# Patient Record
Sex: FEMALE | Race: WHITE | Employment: UNEMPLOYED | URBAN - METROPOLITAN AREA
[De-identification: names, ages, dates, MRNs, and addresses within clinical notes are randomized per-mention and may not be internally consistent; named-entity substitution may affect disease eponyms.]

---

## 2017-04-14 PROCEDURE — 88305 TISSUE EXAM BY PATHOLOGIST: CPT | Performed by: RADIOLOGY

## 2017-05-08 PROCEDURE — 88175 CYTOPATH C/V AUTO FLUID REDO: CPT | Performed by: OBSTETRICS & GYNECOLOGY

## 2017-11-17 PROBLEM — Q23.1 BICUSPID AORTIC VALVE: Status: ACTIVE | Noted: 2017-11-17

## 2017-11-24 ENCOUNTER — HOSPITAL ENCOUNTER (OUTPATIENT)
Dept: CT IMAGING | Facility: HOSPITAL | Age: 57
Discharge: HOME | End: 2017-11-24
Attending: INTERNAL MEDICINE
Payer: COMMERCIAL

## 2017-11-24 DIAGNOSIS — Q23.1 BICUSPID AORTIC VALVE: ICD-10-CM

## 2017-11-24 DIAGNOSIS — E78.00 PURE HYPERCHOLESTEROLEMIA: ICD-10-CM

## 2017-11-24 PROCEDURE — 71275 CT ANGIOGRAPHY CHEST: CPT | Performed by: INTERNAL MEDICINE

## 2018-01-19 ENCOUNTER — HOSPITAL ENCOUNTER (OUTPATIENT)
Dept: LAB | Facility: HOSPITAL | Age: 58
Discharge: HOME | End: 2018-01-19
Attending: THORACIC SURGERY (CARDIOTHORACIC VASCULAR SURGERY)
Payer: COMMERCIAL

## 2018-01-19 ENCOUNTER — HOSPITAL ENCOUNTER (OUTPATIENT)
Dept: INTERVENTIONAL RADIOLOGY/VASCULAR | Facility: HOSPITAL | Age: 58
Discharge: HOME | End: 2018-01-19
Attending: THORACIC SURGERY (CARDIOTHORACIC VASCULAR SURGERY)
Payer: COMMERCIAL

## 2018-01-19 ENCOUNTER — HOSPITAL ENCOUNTER (OUTPATIENT)
Dept: GENERAL RADIOLOGY | Facility: HOSPITAL | Age: 58
Discharge: HOME | End: 2018-01-19
Attending: THORACIC SURGERY (CARDIOTHORACIC VASCULAR SURGERY)
Payer: COMMERCIAL

## 2018-01-19 ENCOUNTER — HOSPITAL ENCOUNTER (OUTPATIENT)
Dept: ULTRASOUND IMAGING | Facility: HOSPITAL | Age: 58
Discharge: HOME | End: 2018-01-19
Attending: THORACIC SURGERY (CARDIOTHORACIC VASCULAR SURGERY)
Payer: COMMERCIAL

## 2018-01-19 DIAGNOSIS — I35.0 AORTIC STENOSIS: ICD-10-CM

## 2018-01-19 DIAGNOSIS — E78.5 HYPERLIPIDEMIA: ICD-10-CM

## 2018-01-19 DIAGNOSIS — F32.A DEPRESSION: ICD-10-CM

## 2018-01-19 DIAGNOSIS — I35.0 AS (AORTIC STENOSIS): ICD-10-CM

## 2018-01-19 DIAGNOSIS — Z01.818 PREOP TESTING: ICD-10-CM

## 2018-01-19 LAB
ALBUMIN SERPL-MCNC: 3.9 G/DL (ref 3.5–4.8)
ALP LIVER SERPL-CCNC: 78 U/L (ref 46–118)
ALT SERPL-CCNC: 34 U/L (ref 14–54)
ANTIBODY SCREEN: NEGATIVE
APTT PPP: 33 SECONDS (ref 25–34)
AST SERPL-CCNC: 20 U/L (ref 15–41)
ATRIAL RATE: 75 BPM
BASOPHILS # BLD AUTO: 0.08 X10(3) UL (ref 0–0.1)
BASOPHILS NFR BLD AUTO: 0.8 %
BILIRUB SERPL-MCNC: 0.4 MG/DL (ref 0.1–2)
BILIRUB UR QL STRIP.AUTO: NEGATIVE
BUN BLD-MCNC: 12 MG/DL (ref 8–20)
CALCIUM BLD-MCNC: 8.9 MG/DL (ref 8.3–10.3)
CHLORIDE: 108 MMOL/L (ref 101–111)
CLARITY UR REFRACT.AUTO: CLEAR
CO2: 26 MMOL/L (ref 22–32)
CREAT BLD-MCNC: 0.68 MG/DL (ref 0.55–1.02)
EOSINOPHIL # BLD AUTO: 0.39 X10(3) UL (ref 0–0.3)
EOSINOPHIL NFR BLD AUTO: 3.7 %
ERYTHROCYTE [DISTWIDTH] IN BLOOD BY AUTOMATED COUNT: 13.4 % (ref 11.5–16)
EST. AVERAGE GLUCOSE BLD GHB EST-MCNC: 126 MG/DL (ref 68–126)
GLUCOSE BLD-MCNC: 94 MG/DL (ref 70–99)
GLUCOSE UR STRIP.AUTO-MCNC: NEGATIVE MG/DL
HBA1C MFR BLD HPLC: 6 % (ref ?–5.7)
HCT VFR BLD AUTO: 42.2 % (ref 34–50)
HGB BLD-MCNC: 13.9 G/DL (ref 12–16)
HYALINE CASTS #/AREA URNS AUTO: PRESENT /LPF
IMMATURE GRANULOCYTE COUNT: 0.02 X10(3) UL (ref 0–1)
IMMATURE GRANULOCYTE RATIO %: 0.2 %
INR BLD: 0.94 (ref 0.89–1.11)
KETONES UR STRIP.AUTO-MCNC: NEGATIVE MG/DL
LYMPHOCYTES # BLD AUTO: 2.97 X10(3) UL (ref 0.9–4)
LYMPHOCYTES NFR BLD AUTO: 28.3 %
M PROTEIN MFR SERPL ELPH: 7.6 G/DL (ref 6.1–8.3)
MCH RBC QN AUTO: 30 PG (ref 27–33.2)
MCHC RBC AUTO-ENTMCNC: 32.9 G/DL (ref 31–37)
MCV RBC AUTO: 90.9 FL (ref 81–100)
MONOCYTES # BLD AUTO: 0.79 X10(3) UL (ref 0.1–0.6)
MONOCYTES NFR BLD AUTO: 7.5 %
NEUTROPHIL ABS PRELIM: 6.23 X10 (3) UL (ref 1.3–6.7)
NEUTROPHILS # BLD AUTO: 6.23 X10(3) UL (ref 1.3–6.7)
NEUTROPHILS NFR BLD AUTO: 59.5 %
NITRITE UR QL STRIP.AUTO: NEGATIVE
P AXIS: 51 DEGREES
P-R INTERVAL: 138 MS
PH UR STRIP.AUTO: 5 [PH] (ref 4.5–8)
PLATELET # BLD AUTO: 346 10(3)UL (ref 150–450)
POTASSIUM SERPL-SCNC: 4 MMOL/L (ref 3.6–5.1)
PROT UR STRIP.AUTO-MCNC: 100 MG/DL
PSA SERPL DL<=0.01 NG/ML-MCNC: 12.6 SECONDS (ref 12–14.3)
Q-T INTERVAL: 428 MS
QRS DURATION: 94 MS
QTC CALCULATION (BEZET): 477 MS
R AXIS: 21 DEGREES
RBC # BLD AUTO: 4.64 X10(6)UL (ref 3.8–5.1)
RBC UR QL AUTO: NEGATIVE
RED CELL DISTRIBUTION WIDTH-SD: 44.8 FL (ref 35.1–46.3)
RH BLOOD TYPE: POSITIVE
SODIUM SERPL-SCNC: 140 MMOL/L (ref 136–144)
SP GR UR STRIP.AUTO: 1.03 (ref 1–1.03)
T AXIS: 118 DEGREES
UROBILINOGEN UR STRIP.AUTO-MCNC: <2 MG/DL
VENTRICULAR RATE: 75 BPM
WBC # BLD AUTO: 10.5 X10(3) UL (ref 4–13)

## 2018-01-19 PROCEDURE — 87077 CULTURE AEROBIC IDENTIFY: CPT | Performed by: THORACIC SURGERY (CARDIOTHORACIC VASCULAR SURGERY)

## 2018-01-19 PROCEDURE — 81001 URINALYSIS AUTO W/SCOPE: CPT | Performed by: THORACIC SURGERY (CARDIOTHORACIC VASCULAR SURGERY)

## 2018-01-19 PROCEDURE — 86901 BLOOD TYPING SEROLOGIC RH(D): CPT | Performed by: THORACIC SURGERY (CARDIOTHORACIC VASCULAR SURGERY)

## 2018-01-19 PROCEDURE — 85610 PROTHROMBIN TIME: CPT | Performed by: THORACIC SURGERY (CARDIOTHORACIC VASCULAR SURGERY)

## 2018-01-19 PROCEDURE — 80053 COMPREHEN METABOLIC PANEL: CPT | Performed by: THORACIC SURGERY (CARDIOTHORACIC VASCULAR SURGERY)

## 2018-01-19 PROCEDURE — 87086 URINE CULTURE/COLONY COUNT: CPT | Performed by: THORACIC SURGERY (CARDIOTHORACIC VASCULAR SURGERY)

## 2018-01-19 PROCEDURE — 85025 COMPLETE CBC W/AUTO DIFF WBC: CPT | Performed by: THORACIC SURGERY (CARDIOTHORACIC VASCULAR SURGERY)

## 2018-01-19 PROCEDURE — 36415 COLL VENOUS BLD VENIPUNCTURE: CPT | Performed by: THORACIC SURGERY (CARDIOTHORACIC VASCULAR SURGERY)

## 2018-01-19 PROCEDURE — 83036 HEMOGLOBIN GLYCOSYLATED A1C: CPT | Performed by: THORACIC SURGERY (CARDIOTHORACIC VASCULAR SURGERY)

## 2018-01-19 PROCEDURE — 86900 BLOOD TYPING SEROLOGIC ABO: CPT | Performed by: THORACIC SURGERY (CARDIOTHORACIC VASCULAR SURGERY)

## 2018-01-19 PROCEDURE — 93005 ELECTROCARDIOGRAM TRACING: CPT

## 2018-01-19 PROCEDURE — 85730 THROMBOPLASTIN TIME PARTIAL: CPT | Performed by: THORACIC SURGERY (CARDIOTHORACIC VASCULAR SURGERY)

## 2018-01-19 PROCEDURE — 93880 EXTRACRANIAL BILAT STUDY: CPT | Performed by: THORACIC SURGERY (CARDIOTHORACIC VASCULAR SURGERY)

## 2018-01-19 PROCEDURE — 93010 ELECTROCARDIOGRAM REPORT: CPT | Performed by: INTERNAL MEDICINE

## 2018-01-19 PROCEDURE — 71045 X-RAY EXAM CHEST 1 VIEW: CPT | Performed by: THORACIC SURGERY (CARDIOTHORACIC VASCULAR SURGERY)

## 2018-01-19 PROCEDURE — 86850 RBC ANTIBODY SCREEN: CPT | Performed by: THORACIC SURGERY (CARDIOTHORACIC VASCULAR SURGERY)

## 2018-01-19 PROCEDURE — 87186 SC STD MICRODIL/AGAR DIL: CPT | Performed by: THORACIC SURGERY (CARDIOTHORACIC VASCULAR SURGERY)

## 2018-01-19 RX ORDER — CALCIUM CARBONATE 500 MG/1
TABLET, CHEWABLE ORAL
COMMUNITY
End: 2018-02-15

## 2018-01-19 NOTE — CM/SW NOTE
01/19/18 1400   CM/SW Referral Data   Referral Source (PAT)   Reason for Referral Discharge planning   Informant Patient;Spouse   Patient Info   Patient's Mental Status Alert;Oriented   Patient's [address]   Patient lives with Spouse   Vandana

## 2018-01-21 ENCOUNTER — ANESTHESIA EVENT (OUTPATIENT)
Dept: CARDIAC SURGERY | Facility: HOSPITAL | Age: 58
DRG: 219 | End: 2018-01-21
Payer: COMMERCIAL

## 2018-01-26 ENCOUNTER — HOSPITAL ENCOUNTER (INPATIENT)
Facility: HOSPITAL | Age: 58
LOS: 13 days | Discharge: HOME | DRG: 219 | End: 2018-02-08
Attending: THORACIC SURGERY (CARDIOTHORACIC VASCULAR SURGERY) | Admitting: THORACIC SURGERY (CARDIOTHORACIC VASCULAR SURGERY)
Payer: COMMERCIAL

## 2018-01-26 ENCOUNTER — APPOINTMENT (OUTPATIENT)
Dept: GENERAL RADIOLOGY | Facility: HOSPITAL | Age: 58
DRG: 219 | End: 2018-01-26
Attending: THORACIC SURGERY (CARDIOTHORACIC VASCULAR SURGERY)
Payer: COMMERCIAL

## 2018-01-26 ENCOUNTER — ANESTHESIA (OUTPATIENT)
Dept: CARDIAC SURGERY | Facility: HOSPITAL | Age: 58
DRG: 219 | End: 2018-01-26
Payer: COMMERCIAL

## 2018-01-26 ENCOUNTER — SURGERY (OUTPATIENT)
Age: 58
End: 2018-01-26

## 2018-01-26 DIAGNOSIS — E78.00 PURE HYPERCHOLESTEROLEMIA: ICD-10-CM

## 2018-01-26 DIAGNOSIS — Q23.1 BICUSPID AORTIC VALVE: ICD-10-CM

## 2018-01-26 LAB
APTT PPP: 34.8 SECONDS (ref 25–34)
ARTERIAL BLD GAS O2 SATURATION: 94 % (ref 92–100)
ARTERIAL BLD GAS O2 SATURATION: 94 % (ref 92–100)
ARTERIAL BLD GAS O2 SATURATION: 95 % (ref 92–100)
ARTERIAL BLD GAS O2 SATURATION: 95 % (ref 92–100)
ARTERIAL BLOOD GAS BASE EXCESS: -1.6
ARTERIAL BLOOD GAS BASE EXCESS: -1.9
ARTERIAL BLOOD GAS BASE EXCESS: -2.1
ARTERIAL BLOOD GAS BASE EXCESS: -3
ARTERIAL BLOOD GAS HCO3: 21.7 MEQ/L (ref 22–26)
ARTERIAL BLOOD GAS HCO3: 23 MEQ/L (ref 22–26)
ARTERIAL BLOOD GAS HCO3: 23.6 MEQ/L (ref 22–26)
ARTERIAL BLOOD GAS HCO3: 24 MEQ/L (ref 22–26)
ARTERIAL BLOOD GAS PCO2: 39 MM HG (ref 35–45)
ARTERIAL BLOOD GAS PCO2: 40 MM HG (ref 35–45)
ARTERIAL BLOOD GAS PCO2: 42 MM HG (ref 35–45)
ARTERIAL BLOOD GAS PCO2: 47 MM HG (ref 35–45)
ARTERIAL BLOOD GAS PH: 7.33 (ref 7.35–7.45)
ARTERIAL BLOOD GAS PH: 7.37 (ref 7.35–7.45)
ARTERIAL BLOOD GAS PH: 7.37 (ref 7.35–7.45)
ARTERIAL BLOOD GAS PH: 7.38 (ref 7.35–7.45)
ARTERIAL BLOOD GAS PO2: 80 MM HG (ref 80–105)
ARTERIAL BLOOD GAS PO2: 81 MM HG (ref 80–105)
ARTERIAL BLOOD GAS PO2: 85 MM HG (ref 80–105)
ARTERIAL BLOOD GAS PO2: 94 MM HG (ref 80–105)
BUN BLD-MCNC: 15 MG/DL (ref 8–20)
CALCIUM BLD-MCNC: 8.1 MG/DL (ref 8.3–10.3)
CALCULATED O2 SATURATION: 95 % (ref 92–100)
CALCULATED O2 SATURATION: 96 % (ref 92–100)
CALCULATED O2 SATURATION: 96 % (ref 92–100)
CALCULATED O2 SATURATION: 97 % (ref 92–100)
CARBOXYHEMOGLOBIN: 0.8 % SAT (ref 0–3)
CARBOXYHEMOGLOBIN: 0.9 % SAT (ref 0–3)
CARBOXYHEMOGLOBIN: 0.9 % SAT (ref 0–3)
CARBOXYHEMOGLOBIN: 1.1 % SAT (ref 0–3)
CHLORIDE: 110 MMOL/L (ref 101–111)
CO2: 25 MMOL/L (ref 22–32)
CREAT BLD-MCNC: 0.96 MG/DL (ref 0.55–1.02)
ERYTHROCYTE [DISTWIDTH] IN BLOOD BY AUTOMATED COUNT: 13.2 % (ref 11.5–16)
FIBRINOGEN: 278 MG/DL (ref 200–446)
FIO2: 50 %
FIO2: 60 %
FIO2: 70 %
FIO2: 70 %
GLUCOSE BLD-MCNC: 115 MG/DL (ref 65–99)
GLUCOSE BLD-MCNC: 129 MG/DL (ref 65–99)
GLUCOSE BLD-MCNC: 131 MG/DL (ref 65–99)
GLUCOSE BLD-MCNC: 135 MG/DL (ref 65–99)
GLUCOSE BLD-MCNC: 142 MG/DL (ref 65–99)
GLUCOSE BLD-MCNC: 143 MG/DL (ref 70–99)
GLUCOSE BLD-MCNC: 151 MG/DL (ref 65–99)
GLUCOSE BLD-MCNC: 153 MG/DL (ref 65–99)
GLUCOSE BLD-MCNC: 159 MG/DL (ref 65–99)
GLUCOSE BLD-MCNC: 165 MG/DL (ref 65–99)
GLUCOSE BLD-MCNC: 168 MG/DL (ref 65–99)
GLUCOSE BLD-MCNC: 169 MG/DL (ref 65–99)
GLUCOSE BLD-MCNC: 177 MG/DL (ref 65–99)
HAV IGM SER QL: 2.5 MG/DL (ref 1.7–3)
HCT VFR BLD AUTO: 31.1 % (ref 34–50)
HGB BLD-MCNC: 10.5 G/DL (ref 12–16)
INR BLD: 1.61 (ref 0.89–1.11)
ISTAT ACTIVATED CLOTTING TIME: 120 SECONDS (ref 74–137)
ISTAT BLOOD GAS BASE EXCESS: 0 MMOL/L
ISTAT BLOOD GAS HCO3: 25.3 MEQ/L (ref 22–26)
ISTAT BLOOD GAS O2 SATURATION: 92 % (ref 92–100)
ISTAT BLOOD GAS PCO2: 45 MMHG (ref 35–45)
ISTAT BLOOD GAS PH: 7.36 (ref 7.35–7.45)
ISTAT BLOOD GAS PO2: 66 MMHG (ref 80–105)
ISTAT BLOOD GAS TCO2: 27 MMOL/L (ref 22–32)
ISTAT HEMATOCRIT: 29 % (ref 34–50)
ISTAT IONIZED CALCIUM: 1.2 MMOL/L (ref 1.12–1.32)
ISTAT POTASSIUM: 4.9 MMOL/L (ref 3.6–5.1)
ISTAT SODIUM: 141 MMOL/L (ref 136–144)
MCH RBC QN AUTO: 30.8 PG (ref 27–33.2)
MCHC RBC AUTO-ENTMCNC: 33.8 G/DL (ref 31–37)
MCV RBC AUTO: 91.2 FL (ref 81–100)
METHEMOGLOBIN: 0.9 % SAT (ref 0.4–1.5)
METHEMOGLOBIN: 1.1 % SAT (ref 0.4–1.5)
METHEMOGLOBIN: 1.2 % SAT (ref 0.4–1.5)
METHEMOGLOBIN: 1.4 % SAT (ref 0.4–1.5)
NITRIC OXIDE: 20 PPM
P/F RATIO: 121.8 MMHG
P/F RATIO: 134.3 MMHG
P/F RATIO: 134.8 MMHG
P/F RATIO: 153.2 MMHG
PATIENT TEMPERATURE: 98.6 F
PATIENT TEMPERATURE: 99.9 F
PEEP: 10 CM H2O
PLATELET # BLD AUTO: 144 10(3)UL (ref 150–450)
PLATELET # BLD AUTO: 144 10(3)UL (ref 150–450)
POTASSIUM SERPL-SCNC: 4.8 MMOL/L (ref 3.6–5.1)
PSA SERPL DL<=0.01 NG/ML-MCNC: 19.3 SECONDS (ref 12–14.3)
RBC # BLD AUTO: 3.41 X10(6)UL (ref 3.8–5.1)
RED CELL DISTRIBUTION WIDTH-SD: 44.2 FL (ref 35.1–46.3)
SODIUM SERPL-SCNC: 141 MMOL/L (ref 136–144)
TIDAL VOLUME: 500 ML
TOTAL HEMOGLOBIN: 10 G/DL (ref 11.7–16)
TOTAL HEMOGLOBIN: 10.2 G/DL (ref 11.7–16)
TOTAL HEMOGLOBIN: 10.6 G/DL (ref 11.7–16)
TOTAL HEMOGLOBIN: 10.8 G/DL (ref 11.7–16)
VENT RATE: 14 /MIN
WBC # BLD AUTO: 18.9 X10(3) UL (ref 4–13)

## 2018-01-26 PROCEDURE — P9045 ALBUMIN (HUMAN), 5%, 250 ML: HCPCS | Performed by: THORACIC SURGERY (CARDIOTHORACIC VASCULAR SURGERY)

## 2018-01-26 PROCEDURE — 82803 BLOOD GASES ANY COMBINATION: CPT

## 2018-01-26 PROCEDURE — 87081 CULTURE SCREEN ONLY: CPT | Performed by: THORACIC SURGERY (CARDIOTHORACIC VASCULAR SURGERY)

## 2018-01-26 PROCEDURE — 85014 HEMATOCRIT: CPT

## 2018-01-26 PROCEDURE — 85610 PROTHROMBIN TIME: CPT | Performed by: THORACIC SURGERY (CARDIOTHORACIC VASCULAR SURGERY)

## 2018-01-26 PROCEDURE — 84132 ASSAY OF SERUM POTASSIUM: CPT

## 2018-01-26 PROCEDURE — 82962 GLUCOSE BLOOD TEST: CPT

## 2018-01-26 PROCEDURE — 93010 ELECTROCARDIOGRAM REPORT: CPT | Performed by: INTERNAL MEDICINE

## 2018-01-26 PROCEDURE — 85730 THROMBOPLASTIN TIME PARTIAL: CPT | Performed by: THORACIC SURGERY (CARDIOTHORACIC VASCULAR SURGERY)

## 2018-01-26 PROCEDURE — 82803 BLOOD GASES ANY COMBINATION: CPT | Performed by: ANESTHESIOLOGY

## 2018-01-26 PROCEDURE — 93005 ELECTROCARDIOGRAM TRACING: CPT

## 2018-01-26 PROCEDURE — 82375 ASSAY CARBOXYHB QUANT: CPT | Performed by: THORACIC SURGERY (CARDIOTHORACIC VASCULAR SURGERY)

## 2018-01-26 PROCEDURE — 30233N0 TRANSFUSION OF AUTOLOGOUS RED BLOOD CELLS INTO PERIPHERAL VEIN, PERCUTANEOUS APPROACH: ICD-10-PCS | Performed by: THORACIC SURGERY (CARDIOTHORACIC VASCULAR SURGERY)

## 2018-01-26 PROCEDURE — 85347 COAGULATION TIME ACTIVATED: CPT

## 2018-01-26 PROCEDURE — P9045 ALBUMIN (HUMAN), 5%, 250 ML: HCPCS

## 2018-01-26 PROCEDURE — 82330 ASSAY OF CALCIUM: CPT

## 2018-01-26 PROCEDURE — 85384 FIBRINOGEN ACTIVITY: CPT | Performed by: THORACIC SURGERY (CARDIOTHORACIC VASCULAR SURGERY)

## 2018-01-26 PROCEDURE — 85018 HEMOGLOBIN: CPT | Performed by: THORACIC SURGERY (CARDIOTHORACIC VASCULAR SURGERY)

## 2018-01-26 PROCEDURE — 5A1221Z PERFORMANCE OF CARDIAC OUTPUT, CONTINUOUS: ICD-10-PCS | Performed by: THORACIC SURGERY (CARDIOTHORACIC VASCULAR SURGERY)

## 2018-01-26 PROCEDURE — 88305 TISSUE EXAM BY PATHOLOGIST: CPT | Performed by: THORACIC SURGERY (CARDIOTHORACIC VASCULAR SURGERY)

## 2018-01-26 PROCEDURE — 94002 VENT MGMT INPAT INIT DAY: CPT

## 2018-01-26 PROCEDURE — 82803 BLOOD GASES ANY COMBINATION: CPT | Performed by: THORACIC SURGERY (CARDIOTHORACIC VASCULAR SURGERY)

## 2018-01-26 PROCEDURE — 84295 ASSAY OF SERUM SODIUM: CPT

## 2018-01-26 PROCEDURE — 86920 COMPATIBILITY TEST SPIN: CPT

## 2018-01-26 PROCEDURE — 83050 HGB METHEMOGLOBIN QUAN: CPT | Performed by: THORACIC SURGERY (CARDIOTHORACIC VASCULAR SURGERY)

## 2018-01-26 PROCEDURE — 80048 BASIC METABOLIC PNL TOTAL CA: CPT | Performed by: THORACIC SURGERY (CARDIOTHORACIC VASCULAR SURGERY)

## 2018-01-26 PROCEDURE — 85049 AUTOMATED PLATELET COUNT: CPT | Performed by: THORACIC SURGERY (CARDIOTHORACIC VASCULAR SURGERY)

## 2018-01-26 PROCEDURE — 83050 HGB METHEMOGLOBIN QUAN: CPT | Performed by: ANESTHESIOLOGY

## 2018-01-26 PROCEDURE — S0028 INJECTION, FAMOTIDINE, 20 MG: HCPCS | Performed by: THORACIC SURGERY (CARDIOTHORACIC VASCULAR SURGERY)

## 2018-01-26 PROCEDURE — 88311 DECALCIFY TISSUE: CPT | Performed by: THORACIC SURGERY (CARDIOTHORACIC VASCULAR SURGERY)

## 2018-01-26 PROCEDURE — 71045 X-RAY EXAM CHEST 1 VIEW: CPT | Performed by: THORACIC SURGERY (CARDIOTHORACIC VASCULAR SURGERY)

## 2018-01-26 PROCEDURE — 83735 ASSAY OF MAGNESIUM: CPT | Performed by: THORACIC SURGERY (CARDIOTHORACIC VASCULAR SURGERY)

## 2018-01-26 PROCEDURE — 85018 HEMOGLOBIN: CPT | Performed by: ANESTHESIOLOGY

## 2018-01-26 PROCEDURE — S0028 INJECTION, FAMOTIDINE, 20 MG: HCPCS

## 2018-01-26 PROCEDURE — 82375 ASSAY CARBOXYHB QUANT: CPT | Performed by: ANESTHESIOLOGY

## 2018-01-26 PROCEDURE — 02RF08Z REPLACEMENT OF AORTIC VALVE WITH ZOOPLASTIC TISSUE, OPEN APPROACH: ICD-10-PCS | Performed by: THORACIC SURGERY (CARDIOTHORACIC VASCULAR SURGERY)

## 2018-01-26 PROCEDURE — 85027 COMPLETE CBC AUTOMATED: CPT | Performed by: THORACIC SURGERY (CARDIOTHORACIC VASCULAR SURGERY)

## 2018-01-26 RX ORDER — DEXTROSE MONOHYDRATE 25 G/50ML
50 INJECTION, SOLUTION INTRAVENOUS
Status: DISCONTINUED | OUTPATIENT
Start: 2018-01-26 | End: 2018-02-08

## 2018-01-26 RX ORDER — HYDROCODONE BITARTRATE AND ACETAMINOPHEN 10; 325 MG/1; MG/1
2 TABLET ORAL EVERY 4 HOURS PRN
Status: DISCONTINUED | OUTPATIENT
Start: 2018-01-26 | End: 2018-02-08

## 2018-01-26 RX ORDER — MORPHINE SULFATE 2 MG/ML
4 INJECTION, SOLUTION INTRAMUSCULAR; INTRAVENOUS
Status: DISCONTINUED | OUTPATIENT
Start: 2018-01-26 | End: 2018-02-08

## 2018-01-26 RX ORDER — CHLORHEXIDINE GLUCONATE 0.12 MG/ML
15 RINSE ORAL
Status: DISCONTINUED | OUTPATIENT
Start: 2018-01-26 | End: 2018-01-27

## 2018-01-26 RX ORDER — POTASSIUM CHLORIDE 14.9 MG/ML
20 INJECTION INTRAVENOUS AS NEEDED
Status: DISCONTINUED | OUTPATIENT
Start: 2018-01-26 | End: 2018-02-06 | Stop reason: ALTCHOICE

## 2018-01-26 RX ORDER — DEXMEDETOMIDINE HYDROCHLORIDE 4 UG/ML
INJECTION, SOLUTION INTRAVENOUS CONTINUOUS
Status: DISCONTINUED | OUTPATIENT
Start: 2018-01-26 | End: 2018-01-29

## 2018-01-26 RX ORDER — FAMOTIDINE 20 MG/1
20 TABLET ORAL 2 TIMES DAILY
Status: DISCONTINUED | OUTPATIENT
Start: 2018-01-26 | End: 2018-02-08

## 2018-01-26 RX ORDER — BISACODYL 10 MG
10 SUPPOSITORY, RECTAL RECTAL
Status: DISCONTINUED | OUTPATIENT
Start: 2018-01-26 | End: 2018-02-08

## 2018-01-26 RX ORDER — DEXTROSE AND SODIUM CHLORIDE 5; .45 G/100ML; G/100ML
INJECTION, SOLUTION INTRAVENOUS CONTINUOUS
Status: ACTIVE | OUTPATIENT
Start: 2018-01-26 | End: 2018-01-27

## 2018-01-26 RX ORDER — FAMOTIDINE 10 MG/ML
20 INJECTION, SOLUTION INTRAVENOUS 2 TIMES DAILY
Status: DISCONTINUED | OUTPATIENT
Start: 2018-01-26 | End: 2018-02-06

## 2018-01-26 RX ORDER — POTASSIUM CHLORIDE 29.8 MG/ML
40 INJECTION INTRAVENOUS AS NEEDED
Status: DISCONTINUED | OUTPATIENT
Start: 2018-01-26 | End: 2018-02-06 | Stop reason: ALTCHOICE

## 2018-01-26 RX ORDER — NITROGLYCERIN 20 MG/100ML
INJECTION INTRAVENOUS CONTINUOUS PRN
Status: DISCONTINUED | OUTPATIENT
Start: 2018-01-26 | End: 2018-02-08

## 2018-01-26 RX ORDER — MORPHINE SULFATE 2 MG/ML
8 INJECTION, SOLUTION INTRAMUSCULAR; INTRAVENOUS
Status: DISCONTINUED | OUTPATIENT
Start: 2018-01-26 | End: 2018-02-08

## 2018-01-26 RX ORDER — ASPIRIN 300 MG
300 SUPPOSITORY, RECTAL RECTAL DAILY
Status: DISCONTINUED | OUTPATIENT
Start: 2018-01-27 | End: 2018-02-03

## 2018-01-26 RX ORDER — SODIUM CHLORIDE 9 MG/ML
INJECTION, SOLUTION INTRAVENOUS CONTINUOUS
Status: DISCONTINUED | OUTPATIENT
Start: 2018-01-26 | End: 2018-01-27

## 2018-01-26 RX ORDER — ATORVASTATIN CALCIUM 80 MG/1
80 TABLET, FILM COATED ORAL NIGHTLY
Status: DISCONTINUED | OUTPATIENT
Start: 2018-01-27 | End: 2018-02-08

## 2018-01-26 RX ORDER — HYDROCODONE BITARTRATE AND ACETAMINOPHEN 10; 325 MG/1; MG/1
1 TABLET ORAL EVERY 4 HOURS PRN
Status: DISCONTINUED | OUTPATIENT
Start: 2018-01-26 | End: 2018-02-08

## 2018-01-26 RX ORDER — ONDANSETRON 2 MG/ML
4 INJECTION INTRAMUSCULAR; INTRAVENOUS EVERY 6 HOURS PRN
Status: DISCONTINUED | OUTPATIENT
Start: 2018-01-26 | End: 2018-02-08

## 2018-01-26 RX ORDER — BACITRACIN 50000 [USP'U]/1
INJECTION, POWDER, LYOPHILIZED, FOR SOLUTION INTRAMUSCULAR AS NEEDED
Status: DISCONTINUED | OUTPATIENT
Start: 2018-01-26 | End: 2018-01-26 | Stop reason: HOSPADM

## 2018-01-26 RX ORDER — MIDAZOLAM HYDROCHLORIDE 1 MG/ML
1 INJECTION INTRAMUSCULAR; INTRAVENOUS EVERY 30 MIN PRN
Status: DISCONTINUED | OUTPATIENT
Start: 2018-01-26 | End: 2018-01-28

## 2018-01-26 RX ORDER — TEMAZEPAM 15 MG/1
15 CAPSULE ORAL NIGHTLY PRN
Status: DISCONTINUED | OUTPATIENT
Start: 2018-01-26 | End: 2018-01-30

## 2018-01-26 RX ORDER — MAGNESIUM SULFATE HEPTAHYDRATE 40 MG/ML
2 INJECTION, SOLUTION INTRAVENOUS AS NEEDED
Status: DISCONTINUED | OUTPATIENT
Start: 2018-01-26 | End: 2018-02-06 | Stop reason: ALTCHOICE

## 2018-01-26 RX ORDER — DOCUSATE SODIUM 100 MG/1
100 CAPSULE, LIQUID FILLED ORAL 2 TIMES DAILY
Status: DISCONTINUED | OUTPATIENT
Start: 2018-01-26 | End: 2018-02-08

## 2018-01-26 RX ORDER — DEXTROSE AND SODIUM CHLORIDE 5; .45 G/100ML; G/100ML
INJECTION, SOLUTION INTRAVENOUS CONTINUOUS
Status: ACTIVE | OUTPATIENT
Start: 2018-01-26 | End: 2018-01-26

## 2018-01-26 RX ORDER — POLYETHYLENE GLYCOL 3350 17 G/17G
1 POWDER, FOR SOLUTION ORAL DAILY PRN
Status: DISCONTINUED | OUTPATIENT
Start: 2018-01-26 | End: 2018-02-08

## 2018-01-26 RX ORDER — MAGNESIUM SULFATE 1 G/100ML
1 INJECTION INTRAVENOUS AS NEEDED
Status: DISCONTINUED | OUTPATIENT
Start: 2018-01-26 | End: 2018-02-06 | Stop reason: ALTCHOICE

## 2018-01-26 RX ORDER — ASPIRIN 325 MG
325 TABLET ORAL ONCE
Status: COMPLETED | OUTPATIENT
Start: 2018-01-26 | End: 2018-01-26

## 2018-01-26 RX ORDER — DOBUTAMINE HYDROCHLORIDE 200 MG/100ML
INJECTION INTRAVENOUS CONTINUOUS PRN
Status: DISCONTINUED | OUTPATIENT
Start: 2018-01-26 | End: 2018-02-08

## 2018-01-26 RX ORDER — ALBUMIN, HUMAN INJ 5% 5 %
250 SOLUTION INTRAVENOUS ONCE AS NEEDED
Status: COMPLETED | OUTPATIENT
Start: 2018-01-26 | End: 2018-01-26

## 2018-01-26 RX ORDER — MORPHINE SULFATE 2 MG/ML
2 INJECTION, SOLUTION INTRAMUSCULAR; INTRAVENOUS
Status: DISCONTINUED | OUTPATIENT
Start: 2018-01-26 | End: 2018-02-08

## 2018-01-26 RX ORDER — ASPIRIN 81 MG/1
81 TABLET ORAL DAILY
Status: DISCONTINUED | OUTPATIENT
Start: 2018-01-27 | End: 2018-02-03

## 2018-01-26 RX ORDER — ASPIRIN 300 MG
300 SUPPOSITORY, RECTAL RECTAL ONCE
Status: COMPLETED | OUTPATIENT
Start: 2018-01-26 | End: 2018-01-26

## 2018-01-26 NOTE — PROGRESS NOTES
Received patient from [place] [address] s/p AVR. Patient intubated with NO and sedated on propofol and precedex. Vent changes per Dr. Ary Holman, see ABGs and documentation. Patient on dobutamine to keep CI>2, titrating off as tolerated. Insulin gtt per protocol.  Patient w

## 2018-01-26 NOTE — ANESTHESIA POSTPROCEDURE EVALUATION
[address] Patient Status:  Inpatient   Age/Gender 59year old female MRN [id number]   [facility] 6NE-A Attending Godfrey Carranza MD   Hosp Day # 0 PCP Harry Lizama DO       Anesthesia Post-op Note    Procedure(s):

## 2018-01-26 NOTE — PROCEDURES
[facility]  Progress Note    Duane Giron Patient Status:  Inpatient    10/6/1960 MRN [id number]   [facility] 6NE-A Attending Anil Crabtree MD   Hosp Day # 0 PCP Mary Kay Staley DO     Impression:  1.  Bicuspid AV w propofol (DIPRIVAN) infusion 5-100 mcg/kg/min Intravenous Continuous   HYDROmorphone (DILAUDID) 0.2 mg/ml continuous infusion 0.1-0.2 mg/hr Intravenous Continuous   Insulin Regular Human (NOVOLIN R) 100 Units in sodium chloride 0.9 % 100 mL infusion 1-57 aspirin 300 MG rectal suppository 300 mg 300 mg Rectal Once   Or      aspirin tab 325 mg 325 mg Per NG Tube Once   [START ON 1/27/2018] aspirin EC tab 81 mg 81 mg Oral Daily   Or      [START ON 1/27/2018] aspirin 300 MG rectal suppository 300 mg 300 mg R 01/26/2018    01/26/2018   CO2 25.0 01/26/2018   BUN 15 01/26/2018   CREATSERUM 0.96 01/26/2018    01/26/2018   CA 8.1 01/26/2018   MG 2.5 01/26/2018       Telemetry: No malignant tachyarrhythmias or bradyarrhythmias    Please feel free to con

## 2018-01-26 NOTE — CM/SW NOTE
Order for [facility] AT [facility]. Pt is s/p AVR. SW met with pt while in PAT. SW will eval for dc needs once able to participate in therapy.   Teri Khan, 01/26/18, 2:34 PM

## 2018-01-26 NOTE — CONSULTS
[facility]  Progress Note    Bárbara Contreras Patient Status:  Inpatient    10/6/1960 MRN [id number]   [facility] 6NE-A Attending Dany Mendieta MD   Hosp Day # 0 PCP [place] DO Che       Assessment/Plan:58 yo with AS admitted for Ventricular rate 99 BPM   Atrial rate 99 BPM   P-R Interval 142 ms   QRS Duration 86 ms   Q-T Interval 424 ms   QTC Calculation (Bezet) 544 ms   P Axis 60 degrees   R Axis 26 degrees   T Axis 79 degrees   -OPEN HEART SURGICAL PROFILE   Collection Time: 0 UNIT ABO A    UNIT RH POS    Product Status Released    Expiration Date [id number]    Blood Type Barcode 6200    -POCT GLUCOSE   Collection Time: 01/26/18  1:04 PM   Result Value Ref Range   POC Glucose 168 (H) 65 - 99 mg/dL   -ARTERIAL BLOOD GAS   Col

## 2018-01-26 NOTE — ANESTHESIA PREPROCEDURE EVALUATION
PRE-OP EVALUATION    Patient Name: Cesar Mandelerfield    Pre-op Diagnosis: aortic stenosis    Procedure(s):  aortic valve replacement     Surgeon(s) and Role:     * Gretta Godwin MD - Primary    Pre-op vitals reviewed.        /89 (BP Location: Left ar estimated ejection fraction is 60-65%. Wall  motion is normal; there are no regional wall motion abnormalities. There is  diastolic dysfunction.   Right ventricle:  The cavity size is normal. Wall thickness is normal.  Systolic function is normal.  Right ve Comment: laser vulva condyloma  No date: TUBAL LIGATION      Comment: navarro tompkins[ps[     Smoking status: Former Smoker  2.00 Packs/day  For 30.00 Years     Types: Cigarettes    Smokeless tobacco: Never Used    Comment: when she smokes-smokes a few a day

## 2018-01-26 NOTE — CONSULTS
[facility]    PATIENT'S NAME: Bishop Yu   ATTENDING PHYSICIAN: Nohemi Allen MD   CONSULTING PHYSICIAN: Sunil Higginbotham M.D.    PATIENT ACCOUNT#:   [de-identified]    LOCATION:  [address] [place] [address]  MEDICAL RECORD #:   [id number]       DATE OF BIRTH:  8 with prediabetes. Continue insulin drip for now and transition to subcutaneous insulin as able. Depending on her hospital course and insulin needs, she may require a meter upon discharge. 2. Ao stenosis- s/p AVR.  Per CV team.    Thank you for this cons

## 2018-01-26 NOTE — DIETARY NOTE
Nutrition Short Note    Dietitian consult received per cardiac rehab protocol. Pt to be educated by cardiac rehab staff and encouraged to attend outpatient classes taught by RD. JOSSY available PRN.     Toney Cavazos RD, LDN

## 2018-01-27 ENCOUNTER — APPOINTMENT (OUTPATIENT)
Dept: GENERAL RADIOLOGY | Facility: HOSPITAL | Age: 58
DRG: 219 | End: 2018-01-27
Attending: THORACIC SURGERY (CARDIOTHORACIC VASCULAR SURGERY)
Payer: COMMERCIAL

## 2018-01-27 LAB
ARTERIAL BLD GAS O2 SATURATION: 90 % (ref 92–100)
ARTERIAL BLD GAS O2 SATURATION: 91 % (ref 92–100)
ARTERIAL BLD GAS O2 SATURATION: 92 % (ref 92–100)
ARTERIAL BLOOD GAS BASE EXCESS: -2.1
ARTERIAL BLOOD GAS BASE EXCESS: -2.9
ARTERIAL BLOOD GAS BASE EXCESS: -3
ARTERIAL BLOOD GAS HCO3: 21.6 MEQ/L (ref 22–26)
ARTERIAL BLOOD GAS HCO3: 21.7 MEQ/L (ref 22–26)
ARTERIAL BLOOD GAS HCO3: 22.4 MEQ/L (ref 22–26)
ARTERIAL BLOOD GAS PCO2: 36 MM HG (ref 35–45)
ARTERIAL BLOOD GAS PCO2: 38 MM HG (ref 35–45)
ARTERIAL BLOOD GAS PCO2: 39 MM HG (ref 35–45)
ARTERIAL BLOOD GAS PH: 7.38 (ref 7.35–7.45)
ARTERIAL BLOOD GAS PH: 7.39 (ref 7.35–7.45)
ARTERIAL BLOOD GAS PH: 7.39 (ref 7.35–7.45)
ARTERIAL BLOOD GAS PO2: 61 MM HG (ref 80–105)
ARTERIAL BLOOD GAS PO2: 63 MM HG (ref 80–105)
ARTERIAL BLOOD GAS PO2: 66 MM HG (ref 80–105)
ATRIAL RATE: 71 BPM
ATRIAL RATE: 99 BPM
BASOPHILS # BLD AUTO: 0.02 X10(3) UL (ref 0–0.1)
BASOPHILS NFR BLD AUTO: 0.1 %
BILIRUB UR QL STRIP.AUTO: NEGATIVE
BUN BLD-MCNC: 16 MG/DL (ref 8–20)
CALCIUM BLD-MCNC: 7.5 MG/DL (ref 8.3–10.3)
CALCULATED O2 SATURATION: 89 % (ref 92–100)
CALCULATED O2 SATURATION: 90 % (ref 92–100)
CALCULATED O2 SATURATION: 92 % (ref 92–100)
CARBOXYHEMOGLOBIN: 0.7 % SAT (ref 0–3)
CARBOXYHEMOGLOBIN: 0.9 % SAT (ref 0–3)
CARBOXYHEMOGLOBIN: 0.9 % SAT (ref 0–3)
CHLORIDE: 115 MMOL/L (ref 101–111)
CO2: 23 MMOL/L (ref 22–32)
COLOR UR AUTO: YELLOW
CREAT BLD-MCNC: 0.85 MG/DL (ref 0.55–1.02)
EOSINOPHIL # BLD AUTO: 0 X10(3) UL (ref 0–0.3)
EOSINOPHIL NFR BLD AUTO: 0 %
ERYTHROCYTE [DISTWIDTH] IN BLOOD BY AUTOMATED COUNT: 13.6 % (ref 11.5–16)
FIO2: 40 %
FIO2: 50 %
FIO2: 60 %
GLUCOSE BLD-MCNC: 101 MG/DL (ref 65–99)
GLUCOSE BLD-MCNC: 103 MG/DL (ref 70–99)
GLUCOSE BLD-MCNC: 109 MG/DL (ref 65–99)
GLUCOSE BLD-MCNC: 113 MG/DL (ref 65–99)
GLUCOSE BLD-MCNC: 115 MG/DL (ref 65–99)
GLUCOSE BLD-MCNC: 115 MG/DL (ref 65–99)
GLUCOSE BLD-MCNC: 116 MG/DL (ref 65–99)
GLUCOSE BLD-MCNC: 116 MG/DL (ref 65–99)
GLUCOSE BLD-MCNC: 117 MG/DL (ref 65–99)
GLUCOSE BLD-MCNC: 117 MG/DL (ref 70–99)
GLUCOSE BLD-MCNC: 118 MG/DL (ref 65–99)
GLUCOSE BLD-MCNC: 118 MG/DL (ref 65–99)
GLUCOSE BLD-MCNC: 119 MG/DL (ref 65–99)
GLUCOSE BLD-MCNC: 120 MG/DL (ref 65–99)
GLUCOSE BLD-MCNC: 120 MG/DL (ref 65–99)
GLUCOSE BLD-MCNC: 124 MG/DL (ref 65–99)
GLUCOSE BLD-MCNC: 135 MG/DL (ref 70–99)
GLUCOSE BLD-MCNC: 140 MG/DL (ref 65–99)
GLUCOSE BLD-MCNC: 145 MG/DL (ref 65–99)
GLUCOSE BLD-MCNC: 151 MG/DL (ref 70–99)
GLUCOSE BLD-MCNC: 152 MG/DL (ref 65–99)
GLUCOSE BLD-MCNC: 152 MG/DL (ref 70–99)
GLUCOSE BLD-MCNC: 159 MG/DL (ref 65–99)
GLUCOSE BLD-MCNC: 163 MG/DL (ref 70–99)
GLUCOSE BLD-MCNC: 79 MG/DL (ref 65–99)
GLUCOSE UR STRIP.AUTO-MCNC: NEGATIVE MG/DL
HAV IGM SER QL: 2 MG/DL (ref 1.7–3)
HCT VFR BLD AUTO: 29.4 % (ref 34–50)
HGB BLD-MCNC: 9.7 G/DL (ref 12–16)
IMMATURE GRANULOCYTE COUNT: 0.12 X10(3) UL (ref 0–1)
IMMATURE GRANULOCYTE RATIO %: 0.6 %
ISTAT ACTIVATED CLOTTING TIME: 120 SECONDS (ref 74–137)
ISTAT ACTIVATED CLOTTING TIME: 131 SECONDS (ref 74–137)
ISTAT ACTIVATED CLOTTING TIME: 543 SECONDS (ref 74–137)
ISTAT ACTIVATED CLOTTING TIME: 571 SECONDS (ref 74–137)
ISTAT ACTIVATED CLOTTING TIME: 670 SECONDS (ref 74–137)
ISTAT ACTIVATED CLOTTING TIME: 736 SECONDS (ref 74–137)
ISTAT BLOOD GAS BASE DEFICIT: -2 MMOL/L
ISTAT BLOOD GAS BASE DEFICIT: -4 MMOL/L
ISTAT BLOOD GAS BASE DEFICIT: -5 MMOL/L
ISTAT BLOOD GAS BASE DEFICIT: -5 MMOL/L
ISTAT BLOOD GAS BASE EXCESS: 0 MMOL/L
ISTAT BLOOD GAS HCO3: 22 MEQ/L (ref 22–26)
ISTAT BLOOD GAS HCO3: 22.7 MEQ/L (ref 22–26)
ISTAT BLOOD GAS HCO3: 22.8 MEQ/L (ref 22–26)
ISTAT BLOOD GAS HCO3: 23 MEQ/L (ref 22–26)
ISTAT BLOOD GAS HCO3: 25.2 MEQ/L (ref 22–26)
ISTAT BLOOD GAS O2 SATURATION: 72 % (ref 92–100)
ISTAT BLOOD GAS O2 SATURATION: 85 % (ref 92–100)
ISTAT BLOOD GAS O2 SATURATION: 91 % (ref 92–100)
ISTAT BLOOD GAS O2 SATURATION: 91 % (ref 92–100)
ISTAT BLOOD GAS O2 SATURATION: 99 % (ref 92–100)
ISTAT BLOOD GAS PCO2: 31 MMHG (ref 35–45)
ISTAT BLOOD GAS PCO2: 31 MMHG (ref 35–45)
ISTAT BLOOD GAS PCO2: 39 MMHG (ref 35–45)
ISTAT BLOOD GAS PCO2: 40 MMHG (ref 35–45)
ISTAT BLOOD GAS PCO2: 40 MMHG (ref 35–45)
ISTAT BLOOD GAS PH: 7.35 (ref 7.35–7.45)
ISTAT BLOOD GAS PH: 7.37 (ref 7.35–7.45)
ISTAT BLOOD GAS PH: 7.4 (ref 7.35–7.45)
ISTAT BLOOD GAS PH: 7.41 (ref 7.35–7.45)
ISTAT BLOOD GAS PH: 7.42 (ref 7.35–7.45)
ISTAT BLOOD GAS PO2: 159 MMHG (ref 80–105)
ISTAT BLOOD GAS PO2: 24 MMHG (ref 80–105)
ISTAT BLOOD GAS PO2: 30 MMHG (ref 80–105)
ISTAT BLOOD GAS PO2: 35 MMHG (ref 80–105)
ISTAT BLOOD GAS PO2: 62 MMHG (ref 80–105)
ISTAT BLOOD GAS TCO2: 23 MMOL/L (ref 22–32)
ISTAT BLOOD GAS TCO2: 24 MMOL/L (ref 22–32)
ISTAT BLOOD GAS TCO2: 26 MMOL/L (ref 22–32)
ISTAT HEMATOCRIT: 23 % (ref 34–50)
ISTAT HEMATOCRIT: 24 % (ref 34–50)
ISTAT HEMATOCRIT: 24 % (ref 34–50)
ISTAT HEMATOCRIT: 28 % (ref 34–50)
ISTAT HEMATOCRIT: 35 % (ref 34–50)
ISTAT IONIZED CALCIUM: 0.95 MMOL/L (ref 1.12–1.32)
ISTAT IONIZED CALCIUM: 1.04 MMOL/L (ref 1.12–1.32)
ISTAT IONIZED CALCIUM: 1.1 MMOL/L (ref 1.12–1.32)
ISTAT IONIZED CALCIUM: 1.18 MMOL/L (ref 1.12–1.32)
ISTAT IONIZED CALCIUM: 1.19 MMOL/L (ref 1.12–1.32)
ISTAT PATIENT TEMPERATURE: 24 DEGREE
ISTAT PATIENT TEMPERATURE: 24 DEGREE
ISTAT PATIENT TEMPERATURE: 36 DEGREE
ISTAT POTASSIUM: 3.9 MMOL/L (ref 3.6–5.1)
ISTAT POTASSIUM: 4.1 MMOL/L (ref 3.6–5.1)
ISTAT POTASSIUM: 4.9 MMOL/L (ref 3.6–5.1)
ISTAT POTASSIUM: 4.9 MMOL/L (ref 3.6–5.1)
ISTAT POTASSIUM: 5.7 MMOL/L (ref 3.6–5.1)
ISTAT SODIUM: 134 MMOL/L (ref 136–144)
ISTAT SODIUM: 135 MMOL/L (ref 136–144)
ISTAT SODIUM: 139 MMOL/L (ref 136–144)
ISTAT SODIUM: 139 MMOL/L (ref 136–144)
ISTAT SODIUM: 140 MMOL/L (ref 136–144)
KETONES UR STRIP.AUTO-MCNC: NEGATIVE MG/DL
LEUKOCYTE ESTERASE UR QL STRIP.AUTO: NEGATIVE
LYMPHOCYTES # BLD AUTO: 1.18 X10(3) UL (ref 0.9–4)
LYMPHOCYTES NFR BLD AUTO: 5.5 %
MCH RBC QN AUTO: 30.5 PG (ref 27–33.2)
MCHC RBC AUTO-ENTMCNC: 33 G/DL (ref 31–37)
MCV RBC AUTO: 92.5 FL (ref 81–100)
METHEMOGLOBIN: 0.5 % SAT (ref 0.4–1.5)
METHEMOGLOBIN: 0.8 % SAT (ref 0.4–1.5)
METHEMOGLOBIN: 0.9 % SAT (ref 0.4–1.5)
MONOCYTES # BLD AUTO: 1.03 X10(3) UL (ref 0.1–0.6)
MONOCYTES NFR BLD AUTO: 4.8 %
NEUTROPHIL ABS PRELIM: 19 X10 (3) UL (ref 1.3–6.7)
NEUTROPHILS # BLD AUTO: 19 X10(3) UL (ref 1.3–6.7)
NEUTROPHILS NFR BLD AUTO: 89 %
NITRIC OXIDE: 10 PPM
NITRIC OXIDE: 20 PPM
NITRIC OXIDE: 20 PPM
NITRITE UR QL STRIP.AUTO: NEGATIVE
P AXIS: 50 DEGREES
P AXIS: 60 DEGREES
P-R INTERVAL: 116 MS
P-R INTERVAL: 142 MS
P/F RATIO: 115 MMHG
P/F RATIO: 119.3 MMHG
PATIENT TEMPERATURE: 100.1 F
PATIENT TEMPERATURE: 98.6 F
PATIENT TEMPERATURE: 99.9 F
PEEP: 10 CM H2O
PEEP: 7 CM H2O
PEEP: 8 CM H2O
PH UR STRIP.AUTO: 5 [PH] (ref 4.5–8)
PLATELET # BLD AUTO: 169 10(3)UL (ref 150–450)
POTASSIUM SERPL-SCNC: 4.3 MMOL/L (ref 3.6–5.1)
PROCALCITONIN SERPL-MCNC: <0.11 NG/ML (ref ?–0.11)
PROT UR STRIP.AUTO-MCNC: 30 MG/DL
Q-T INTERVAL: 424 MS
Q-T INTERVAL: 496 MS
QRS DURATION: 82 MS
QRS DURATION: 86 MS
QTC CALCULATION (BEZET): 538 MS
QTC CALCULATION (BEZET): 544 MS
R AXIS: 26 DEGREES
R AXIS: 32 DEGREES
RBC # BLD AUTO: 3.18 X10(6)UL (ref 3.8–5.1)
RBC UR QL AUTO: NEGATIVE
RED CELL DISTRIBUTION WIDTH-SD: 46.6 FL (ref 35.1–46.3)
SODIUM SERPL-SCNC: 145 MMOL/L (ref 136–144)
SP GR UR STRIP.AUTO: 1.03 (ref 1–1.03)
T AXIS: 79 DEGREES
T AXIS: 85 DEGREES
TIDAL VOLUME: 500 ML
TOTAL HEMOGLOBIN: 9.1 G/DL (ref 11.7–16)
TOTAL HEMOGLOBIN: 9.7 G/DL (ref 11.7–16)
TOTAL HEMOGLOBIN: 9.8 G/DL (ref 11.7–16)
UROBILINOGEN UR STRIP.AUTO-MCNC: <2 MG/DL
VENT RATE: 14 /MIN
VENT RATE: 14 /MIN
VENT RATE: 16 /MIN
VENTRICULAR RATE: 71 BPM
VENTRICULAR RATE: 99 BPM
WBC # BLD AUTO: 21.4 X10(3) UL (ref 4–13)

## 2018-01-27 PROCEDURE — 82375 ASSAY CARBOXYHB QUANT: CPT | Performed by: ANESTHESIOLOGY

## 2018-01-27 PROCEDURE — 94003 VENT MGMT INPAT SUBQ DAY: CPT

## 2018-01-27 PROCEDURE — 85018 HEMOGLOBIN: CPT | Performed by: ANESTHESIOLOGY

## 2018-01-27 PROCEDURE — 83735 ASSAY OF MAGNESIUM: CPT | Performed by: THORACIC SURGERY (CARDIOTHORACIC VASCULAR SURGERY)

## 2018-01-27 PROCEDURE — 93005 ELECTROCARDIOGRAM TRACING: CPT

## 2018-01-27 PROCEDURE — 84145 PROCALCITONIN (PCT): CPT | Performed by: INTERNAL MEDICINE

## 2018-01-27 PROCEDURE — 82803 BLOOD GASES ANY COMBINATION: CPT | Performed by: INTERNAL MEDICINE

## 2018-01-27 PROCEDURE — 71045 X-RAY EXAM CHEST 1 VIEW: CPT | Performed by: THORACIC SURGERY (CARDIOTHORACIC VASCULAR SURGERY)

## 2018-01-27 PROCEDURE — 93010 ELECTROCARDIOGRAM REPORT: CPT | Performed by: INTERNAL MEDICINE

## 2018-01-27 PROCEDURE — S0028 INJECTION, FAMOTIDINE, 20 MG: HCPCS | Performed by: THORACIC SURGERY (CARDIOTHORACIC VASCULAR SURGERY)

## 2018-01-27 PROCEDURE — 82962 GLUCOSE BLOOD TEST: CPT

## 2018-01-27 PROCEDURE — 85025 COMPLETE CBC W/AUTO DIFF WBC: CPT | Performed by: THORACIC SURGERY (CARDIOTHORACIC VASCULAR SURGERY)

## 2018-01-27 PROCEDURE — 87040 BLOOD CULTURE FOR BACTERIA: CPT | Performed by: INTERNAL MEDICINE

## 2018-01-27 PROCEDURE — 94640 AIRWAY INHALATION TREATMENT: CPT

## 2018-01-27 PROCEDURE — 82375 ASSAY CARBOXYHB QUANT: CPT | Performed by: INTERNAL MEDICINE

## 2018-01-27 PROCEDURE — 85018 HEMOGLOBIN: CPT | Performed by: INTERNAL MEDICINE

## 2018-01-27 PROCEDURE — 83050 HGB METHEMOGLOBIN QUAN: CPT | Performed by: INTERNAL MEDICINE

## 2018-01-27 PROCEDURE — 82803 BLOOD GASES ANY COMBINATION: CPT | Performed by: ANESTHESIOLOGY

## 2018-01-27 PROCEDURE — 81001 URINALYSIS AUTO W/SCOPE: CPT | Performed by: INTERNAL MEDICINE

## 2018-01-27 PROCEDURE — 87086 URINE CULTURE/COLONY COUNT: CPT | Performed by: INTERNAL MEDICINE

## 2018-01-27 PROCEDURE — 83050 HGB METHEMOGLOBIN QUAN: CPT | Performed by: ANESTHESIOLOGY

## 2018-01-27 PROCEDURE — 80048 BASIC METABOLIC PNL TOTAL CA: CPT | Performed by: THORACIC SURGERY (CARDIOTHORACIC VASCULAR SURGERY)

## 2018-01-27 RX ORDER — FUROSEMIDE 10 MG/ML
INJECTION INTRAMUSCULAR; INTRAVENOUS
Status: COMPLETED
Start: 2018-01-27 | End: 2018-01-27

## 2018-01-27 RX ORDER — SODIUM CHLORIDE, SODIUM LACTATE, POTASSIUM CHLORIDE, CALCIUM CHLORIDE 600; 310; 30; 20 MG/100ML; MG/100ML; MG/100ML; MG/100ML
INJECTION, SOLUTION INTRAVENOUS CONTINUOUS
Status: DISCONTINUED | OUTPATIENT
Start: 2018-01-27 | End: 2018-01-29

## 2018-01-27 RX ORDER — IPRATROPIUM BROMIDE AND ALBUTEROL SULFATE 2.5; .5 MG/3ML; MG/3ML
3 SOLUTION RESPIRATORY (INHALATION) EVERY 4 HOURS PRN
Status: DISCONTINUED | OUTPATIENT
Start: 2018-01-27 | End: 2018-01-31

## 2018-01-27 RX ORDER — FUROSEMIDE 10 MG/ML
20 INJECTION INTRAMUSCULAR; INTRAVENOUS
Status: DISCONTINUED | OUTPATIENT
Start: 2018-01-27 | End: 2018-01-27

## 2018-01-27 RX ORDER — ACETAMINOPHEN 160 MG/5ML
650 SOLUTION ORAL EVERY 6 HOURS PRN
Status: DISCONTINUED | OUTPATIENT
Start: 2018-01-27 | End: 2018-02-02

## 2018-01-27 RX ORDER — POLYETHYLENE GLYCOL 3350 17 G/17G
17 POWDER, FOR SOLUTION ORAL DAILY PRN
Status: DISCONTINUED | OUTPATIENT
Start: 2018-01-27 | End: 2018-01-27

## 2018-01-27 RX ORDER — BISACODYL 10 MG
10 SUPPOSITORY, RECTAL RECTAL
Status: DISCONTINUED | OUTPATIENT
Start: 2018-01-27 | End: 2018-02-06

## 2018-01-27 RX ORDER — SODIUM PHOSPHATE, DIBASIC AND SODIUM PHOSPHATE, MONOBASIC 7; 19 G/133ML; G/133ML
1 ENEMA RECTAL ONCE AS NEEDED
Status: DISCONTINUED | OUTPATIENT
Start: 2018-01-27 | End: 2018-02-06 | Stop reason: ALTCHOICE

## 2018-01-27 RX ORDER — CHLORHEXIDINE GLUCONATE 0.12 MG/ML
15 RINSE ORAL
Status: DISCONTINUED | OUTPATIENT
Start: 2018-01-27 | End: 2018-01-28

## 2018-01-27 RX ORDER — FUROSEMIDE 10 MG/ML
20 INJECTION INTRAMUSCULAR; INTRAVENOUS
Status: DISCONTINUED | OUTPATIENT
Start: 2018-01-27 | End: 2018-01-28

## 2018-01-27 RX ORDER — ACETAMINOPHEN 325 MG/1
650 TABLET ORAL EVERY 6 HOURS PRN
Status: DISCONTINUED | OUTPATIENT
Start: 2018-01-27 | End: 2018-02-02

## 2018-01-27 RX ORDER — DOCUSATE SODIUM 100 MG/1
100 CAPSULE, LIQUID FILLED ORAL 2 TIMES DAILY
Status: DISCONTINUED | OUTPATIENT
Start: 2018-01-27 | End: 2018-01-27

## 2018-01-27 RX ORDER — ACETAMINOPHEN 650 MG/1
650 SUPPOSITORY RECTAL EVERY 6 HOURS PRN
Status: DISCONTINUED | OUTPATIENT
Start: 2018-01-27 | End: 2018-02-02

## 2018-01-27 NOTE — PLAN OF CARE
Assumed care for this patient at 0730. Patient on light sedation, intubated with NO. Patient able to follow simple commands. Rapelje, CT and white remain in place. Low dose lasix ordered and given with response noted. Blood and urine cultures sent. VSS.  On 1

## 2018-01-27 NOTE — PROGRESS NOTES
[facility]  Progress Note    Sangita Fuentes Patient Status:  Inpatient    10/6/1960 MRN [id number]   [facility] 6NE-A Attending Seamus Zhang MD   Hosp Day # 1 PCP Ziggy Amos DO       Assessment/Plan:56 yo with AS admitted for ms   QRS Duration 86 ms   Q-T Interval 424 ms   QTC Calculation (Bezet) 544 ms   P Axis 60 degrees   R Axis 26 degrees   T Axis 79 degrees   -OPEN HEART SURGICAL PROFILE   Collection Time: 01/26/18 11:40 AM   Result Value Ref Range   Glucose 143 (H) 70 - 9 Date [id number]    Blood Type Barcode 6200    -POCT GLUCOSE   Collection Time: 01/26/18  1:04 PM   Result Value Ref Range   POC Glucose 168 (H) 65 - 99 mg/dL   -ARTERIAL BLOOD GAS   Collection Time: 01/26/18  1:38 PM   Result Value Ref Range   ABG pH 7.3 Glucose 159 (H) 65 - 99 mg/dL   -ARTERIAL BLOOD GAS   Collection Time: 01/26/18  5:17 PM   Result Value Ref Range   ABG pH 7.38 7.35 - 7.45   ABG pCO2 40 35 - 45 mm Hg   ABG pO2 81 80 - 105 mm Hg   ABG HCO3 23.0 22.0 - 26.0 mEq/L   ABG Base Excess -1.9 POC Glucose 129 (H) 65 - 99 mg/dL   -POCT GLUCOSE   Collection Time: 01/26/18 11:08 PM   Result Value Ref Range   POC Glucose 115 (H) 65 - 99 mg/dL   -POCT GLUCOSE   Collection Time: 01/27/18 12:00 AM   Result Value Ref Range   POC Glucose 159 (H) 65 - 9 Eosinophil % 0.0 %   Basophil % 0.1 %   Immature Granulocyte % 0.6 %   -EKG 12-LEAD   Collection Time: 01/27/18  4:24 AM   Result Value Ref Range   Ventricular rate 71 BPM   Atrial rate 71 BPM   P-R Interval 116 ms   QRS Duration 82 ms   Q-T Interval 496

## 2018-01-27 NOTE — RESPIRATORY THERAPY NOTE
Received pt @ 2300 on vent settings VC+ 14/500/50%/+8 and NO 20ppm. BS are diminished. Sating into the low to mid 90's. ABG done @ 0500. See labs. PER RN from MD, FIO2 changed to 60% and PEEP increased to 10. PRN duoneb given.  Repeat ABG to be done, Will c

## 2018-01-27 NOTE — CONSULTS
Critical Care H&P/Consult     NAME: Eddie Hsu: 2320/7579-B - MRN: [id number] - Age: 59year old - :  10/6/1960    Date of Admission: 2018  5:12 AM  Admission Diagnosis: aortic stenosis      Assessment/Plan:  1.  Acute respiratory fail cholesterol  No date: HYPERLIPIDEMIA  No date: Migraines  No date: Post-nasal drip  No date: Shortness of breath  No date: Valvular diseasePast Surgical History:  2010: BENIGN BIOPSY LEFT  No date: BENIGN BIOPSY RIGHT      Comment: has small metal clip   2 Midazolam HCl (VERSED) 2 MG/2ML injection 1 mg 1 mg Intravenous Q30 Min PRN   propofol (DIPRIVAN) infusion 5-100 mcg/kg/min Intravenous Continuous   HYDROmorphone (DILAUDID) 0.2 mg/ml continuous infusion 0.1-0.2 mg/hr Intravenous Continuous   Insulin Reg dextrose 5 % 250 mL infusion 0.1-4 mcg/kg/min Intravenous Continuous PRN   [COMPLETED] aspirin 300 MG rectal suppository 300 mg 300 mg Rectal Once   Or      [COMPLETED] aspirin tab 325 mg 325 mg Per NG Tube Once   aspirin EC tab 81 mg 81 mg Oral Daily   Or mcg/min (01/27/18 0600)   • nitroprusside (NIPRIDE) 50 mg in D5W infusion     • Dexmedetomidine HCl in NaCl 0.8 mcg/kg/hr (01/27/18 0800)       Review of systems:   12 point review of systems performed and is negative aside from what is noted above    [address]  115*   CO2  25.0  23.0     No results for input(s): TROP, CK in the last 72 hours. Imaging: I independently visualized all relevant chest imaging in PACS, agree with radiology interpretation except where noted.

## 2018-01-27 NOTE — PLAN OF CARE
Assumed care of pt @ 1930. Rec'd pt vented/sedated on Precedex/Propofol/Dilaudid. Pt. Opens eyes, MAEx4 to command on sedation holiday. Tele=NSR, rate 70's, normotensive. CTx2 to wall suction, no air leak, no crepitus.   Cristo [place] josh catheter transdu

## 2018-01-27 NOTE — PROGRESS NOTES
S/P- AVR. POD#1   Pt stayed intubated overnite due to poor oxygenation. Was on Fio2 of 50 and PEEP 8 and NO/  So pulmonary consult called and have taken over her management.  at bedside, Pt on sedation but responding appropriately.   Still on NO ,

## 2018-01-27 NOTE — PROGRESS NOTES
[facility]   [facility] Progress Note    Rod Costa Patient Status:  Inpatient    10/6/1960 MRN [id number]   [facility] 6NE-A Attending Godfrey Carranza MD   Hosp Day # 1 PCP Harry Lizama DO     Subjective:   Intubated and sedated [email or web address]   EPINEPHrine HCl (ADRENALIN) 4 mg in sodium chloride 0.9 % 250 mL infusion 1-10 mcg/min Intravenous Continuous   amiodarone HCl in Dextrose (CORDARONE) 360 MG/200ML premix infusion 0.5 mg/min Intravenous Continuous   Midazolam HCl (VERSED) 2 PRN   aspirin EC tab 81 mg 81 mg Oral Daily   Or      aspirin 300 MG rectal suppository 300 mg 300 mg Rectal Daily   metoprolol Tartrate (LOPRESSOR) tab 25 mg 25 mg Oral 2x Daily(Beta Blocker)   docusate sodium (COLACE) cap 100 mg 100 mg Oral BID   Or =====  CONCLUSION:  Slight decrease in bibasilar discoid atelectasis. No CHF. No pneumothorax. Physical Exam:    Neuro:sedated  Lungs: diminished lung sounds   Heart: s1s2 RRR. NSR.  Sternum stable   Abdomen: soft, hypoactive BS  Extremities:+ pulses

## 2018-01-27 NOTE — PROGRESS NOTES
[facility]  Progress Note    Duane Giron Patient Status:  Inpatient    10/6/1960 MRN [id number]   [facility] 6NE-A Attending Anil Crabtree MD   Hosp Day # 1 PCP Mary Kay Staley DO     Impression:  1.  Bicuspid AV w Or      fentaNYL citrate (SUBLIMAZE) 0.05 MG/ML injection 50 mcg 50 mcg Intravenous Q30 Min PRN   acetaminophen (TYLENOL) tab 650 mg 650 mg Oral Q6H PRN   Or      acetaminophen (TYLENOL) 160 MG/5ML oral liquid 650 mg 650 mg Oral Q6H PRN   Or      acetami morphINE sulfate (PF) 2 MG/ML injection 2 mg 2 mg Intravenous Q1H PRN   Or      morphINE sulfate (PF) 2 MG/ML injection 4 mg 4 mg Intravenous Q1H PRN   Or      morphINE sulfate (PF) 2 MG/ML injection 8 mg 8 mg Intravenous Q1H PRN   temazepam (RESTORIL) c Data:    Lab Results  Component Value Date   WBC 21.4 01/27/2018   RBC 3.18 01/27/2018   HGB 9.7 01/27/2018   HCT 29.4 01/27/2018   MCV 92.5 01/27/2018   MCH 30.5 01/27/2018   MCHC 33.0 01/27/2018   RDW 13.6 01/27/2018   .0 01/27/2018       Lab Resu

## 2018-01-28 ENCOUNTER — APPOINTMENT (OUTPATIENT)
Dept: GENERAL RADIOLOGY | Facility: HOSPITAL | Age: 58
DRG: 219 | End: 2018-01-28
Attending: THORACIC SURGERY (CARDIOTHORACIC VASCULAR SURGERY)
Payer: COMMERCIAL

## 2018-01-28 LAB
ARTERIAL BLD GAS O2 SATURATION: 93 % (ref 92–100)
ARTERIAL BLD GAS O2 SATURATION: 94 % (ref 92–100)
ARTERIAL BLOOD GAS BASE EXCESS: -2.7
ARTERIAL BLOOD GAS BASE EXCESS: -2.8
ARTERIAL BLOOD GAS HCO3: 21.4 MEQ/L (ref 22–26)
ARTERIAL BLOOD GAS HCO3: 22.2 MEQ/L (ref 22–26)
ARTERIAL BLOOD GAS PCO2: 35 MM HG (ref 35–45)
ARTERIAL BLOOD GAS PCO2: 39 MM HG (ref 35–45)
ARTERIAL BLOOD GAS PH: 7.38 (ref 7.35–7.45)
ARTERIAL BLOOD GAS PH: 7.4 (ref 7.35–7.45)
ARTERIAL BLOOD GAS PO2: 69 MM HG (ref 80–105)
ARTERIAL BLOOD GAS PO2: 75 MM HG (ref 80–105)
ATRIAL RATE: 67 BPM
BUN BLD-MCNC: 19 MG/DL (ref 8–20)
CALCIUM BLD-MCNC: 7.4 MG/DL (ref 8.3–10.3)
CALCULATED O2 SATURATION: 94 % (ref 92–100)
CALCULATED O2 SATURATION: 94 % (ref 92–100)
CARBOXYHEMOGLOBIN: 0.9 % SAT (ref 0–3)
CARBOXYHEMOGLOBIN: 0.9 % SAT (ref 0–3)
CHLORIDE: 114 MMOL/L (ref 101–111)
CO2: 25 MMOL/L (ref 22–32)
CREAT BLD-MCNC: 0.74 MG/DL (ref 0.55–1.02)
FIO2: 60 %
FIO2: 60 %
GLUCOSE BLD-MCNC: 100 MG/DL (ref 65–99)
GLUCOSE BLD-MCNC: 103 MG/DL (ref 65–99)
GLUCOSE BLD-MCNC: 104 MG/DL (ref 65–99)
GLUCOSE BLD-MCNC: 105 MG/DL (ref 65–99)
GLUCOSE BLD-MCNC: 110 MG/DL (ref 65–99)
GLUCOSE BLD-MCNC: 110 MG/DL (ref 70–99)
GLUCOSE BLD-MCNC: 112 MG/DL (ref 65–99)
GLUCOSE BLD-MCNC: 117 MG/DL (ref 65–99)
GLUCOSE BLD-MCNC: 118 MG/DL (ref 65–99)
GLUCOSE BLD-MCNC: 118 MG/DL (ref 65–99)
GLUCOSE BLD-MCNC: 119 MG/DL (ref 65–99)
GLUCOSE BLD-MCNC: 119 MG/DL (ref 65–99)
GLUCOSE BLD-MCNC: 120 MG/DL (ref 65–99)
GLUCOSE BLD-MCNC: 120 MG/DL (ref 65–99)
GLUCOSE BLD-MCNC: 133 MG/DL (ref 65–99)
METHEMOGLOBIN: 0.3 % SAT (ref 0.4–1.5)
METHEMOGLOBIN: 0.5 % SAT (ref 0.4–1.5)
P AXIS: 67 DEGREES
P-R INTERVAL: 138 MS
P/F RATIO: 115 MMHG
P/F RATIO: 124.9 MMHG
PATIENT TEMPERATURE: 98.6 F
PATIENT TEMPERATURE: 98.6 F
PEEP: 5 CM H2O
PEEP: 5 CM H2O
POTASSIUM SERPL-SCNC: 4 MMOL/L (ref 3.6–5.1)
Q-T INTERVAL: 484 MS
QRS DURATION: 86 MS
QTC CALCULATION (BEZET): 511 MS
R AXIS: 23 DEGREES
SODIUM SERPL-SCNC: 144 MMOL/L (ref 136–144)
T AXIS: 83 DEGREES
TIDAL VOLUME: 500 ML
TIDAL VOLUME: 500 ML
TOTAL HEMOGLOBIN: 9.6 G/DL (ref 11.7–16)
TOTAL HEMOGLOBIN: 9.6 G/DL (ref 11.7–16)
VENT RATE: 14 /MIN
VENT RATE: 14 /MIN
VENTRICULAR RATE: 67 BPM

## 2018-01-28 PROCEDURE — 85018 HEMOGLOBIN: CPT | Performed by: INTERNAL MEDICINE

## 2018-01-28 PROCEDURE — 82375 ASSAY CARBOXYHB QUANT: CPT | Performed by: INTERNAL MEDICINE

## 2018-01-28 PROCEDURE — 83050 HGB METHEMOGLOBIN QUAN: CPT | Performed by: INTERNAL MEDICINE

## 2018-01-28 PROCEDURE — 93010 ELECTROCARDIOGRAM REPORT: CPT | Performed by: INTERNAL MEDICINE

## 2018-01-28 PROCEDURE — 94003 VENT MGMT INPAT SUBQ DAY: CPT

## 2018-01-28 PROCEDURE — 93005 ELECTROCARDIOGRAM TRACING: CPT

## 2018-01-28 PROCEDURE — 94150 VITAL CAPACITY TEST: CPT

## 2018-01-28 PROCEDURE — 82803 BLOOD GASES ANY COMBINATION: CPT | Performed by: INTERNAL MEDICINE

## 2018-01-28 PROCEDURE — 82962 GLUCOSE BLOOD TEST: CPT

## 2018-01-28 PROCEDURE — 80048 BASIC METABOLIC PNL TOTAL CA: CPT | Performed by: THORACIC SURGERY (CARDIOTHORACIC VASCULAR SURGERY)

## 2018-01-28 PROCEDURE — 71045 X-RAY EXAM CHEST 1 VIEW: CPT | Performed by: THORACIC SURGERY (CARDIOTHORACIC VASCULAR SURGERY)

## 2018-01-28 RX ORDER — FUROSEMIDE 10 MG/ML
40 INJECTION INTRAMUSCULAR; INTRAVENOUS
Status: COMPLETED | OUTPATIENT
Start: 2018-01-28 | End: 2018-01-29

## 2018-01-28 RX ORDER — DEXTROSE MONOHYDRATE 25 G/50ML
50 INJECTION, SOLUTION INTRAVENOUS
Status: DISCONTINUED | OUTPATIENT
Start: 2018-01-28 | End: 2018-01-28

## 2018-01-28 NOTE — RESPIRATORY THERAPY NOTE
Received pt @ 1900 on vent settings VC+ 14/500/60%/+5 and NO 10ppm. BS are diminished with scant to small white, thick secretions. Will continue to monitor.

## 2018-01-28 NOTE — PROGRESS NOTES
[facility]   [facility] Progress Note    Mora Daubs Patient Status:  Inpatient    10/6/1960 MRN [id number]   [facility] 6NE-A Attending Raymundo Bravo MD   Hosp Day # 2 PCP Ally Navarro DO     Subjective:  More awake today.   Cristela 0.12 % solution 15 mL 15 mL Mouth/Throat [email or web address]   furosemide (LASIX) injection 20 mg 20 mg Intravenous BID (Diuretic)   EPINEPHrine HCl (ADRENALIN) 4 mg in sodium chloride 0.9 % 250 mL infusion 1-10 mcg/min Intravenous Continuous   amiodarone HCl in Nitroprusside Sodium (NIPRIDE) 50 mg in dextrose 5 % 250 mL infusion 0.1-4 mcg/kg/min Intravenous Continuous PRN   aspirin EC tab 81 mg 81 mg Oral Daily   Or      aspirin 300 MG rectal suppository 300 mg 300 mg Rectal Daily   metoprolol Tartrate (Mario Concepcion Headache(784.0)     Depressed     Allergic rhinitis     Headache     Quadriceps strain     Nonrheumatic aortic valve stenosis     Anemia     Trigger finger of left thumb     Bicuspid aortic valve      POD# 2 s/p AVR  - HD stable, now off pressors  - resp f

## 2018-01-28 NOTE — PROGRESS NOTES
[facility] Cardiology  Progress Note    Celesterabia Kim Patient Status:  Inpatient    10/6/1960 MRN [id number]   [facility] 6NE-A Attending Chuck Miranda MD   Hosp Day # 2 PCP Jeanine Kuo DO     Impression:  1.  Bicuspid AV w mL Nebulization Q4H PRN   lactated ringers infusion  Intravenous Continuous   fentaNYL citrate (SUBLIMAZE) 0.05 MG/ML injection 25 mcg 25 mcg Intravenous Q30 Min PRN   Or      fentaNYL citrate (SUBLIMAZE) 0.05 MG/ML injection 50 mcg 50 mcg Intravenous Q30 HYDROcodone-acetaminophen (NORCO)  MG per tab 1 tablet 1 tablet Oral Q4H PRN   Or      HYDROcodone-acetaminophen (NORCO)  MG per tab 2 tablet 2 tablet Oral Q4H PRN   morphINE sulfate (PF) 2 MG/ML injection 2 mg 2 mg Intravenous Q1H PRN   Or ointment OINT 1 Application 1 Application Nasal BID   Dexmedetomidine HCl in NaCl (PRECEDEX) 400 MCG/100ML premix infusion 0.2-1.5 mcg/kg/hr Intravenous Continuous       Laboratory Data:       Lab Results  Component Value Date   INR 1.61 (H) 01/26/2018   I

## 2018-01-28 NOTE — PLAN OF CARE
Assumed care of pt @ 1930. Rec'd pt vented/lightly sedated on Propofol/Precedex/Fentanyl. Pt. Opens eyes to voice, MAEx4 to command. Tele=NSR, rate 70's, normotensive. Levophed/Insulin drips maintained/titrated per MD orders. Amiodarone IV noted.   CTx

## 2018-01-28 NOTE — PROGRESS NOTES
[facility]  Progress Note    Claudia Darling Patient Status:  Inpatient    10/6/1960 MRN [id number]   [facility] 6NE-A Attending Ashlee Jacobs MD   Hosp Day # 2 PCP Matty Paula DO       Assessment/Plan:58 yo with AS admitted for mL   PEEP 10.0 cm H2O   Nitric Oxide 20 PPM   -POCT GLUCOSE   Collection Time: 01/27/18  6:59 AM   Result Value Ref Range   POC Glucose 116 (H) 65 - 99 mg/dL   -POCT GLUCOSE   Collection Time: 01/27/18  8:13 AM   Result Value Ref Range   POC Glucose 140 (H Negative mg/dL   Blood Urine Negative Negative   pH Urine 5.0 4.5 - 8.0   Protein Urine 30  (A) Negative mg/dl   Urobilinogen Urine <2.0 0.2 - 2.0 mg/dL   Nitrite Urine Negative Negative   Leukocyte Esterase Urine Negative Negative   WBC Urine 1-4 <5 /HPF -BASIC METABOLIC PANEL (8)   Collection Time: 01/28/18  4:03 AM   Result Value Ref Range   Glucose 110 (H) 70 - 99 mg/dL   BUN 19 8 - 20 mg/dL   Creatinine 0.74 0.55 - 1.02 mg/dL   GFR 89 >=60   Calcium, Total 7.4 (L) 8.3 - 10.3 mg/dL   Sodium 144 136 -

## 2018-01-28 NOTE — PHYSICAL THERAPY NOTE
Pt is currently intubated and on HOLD for PT evaluation today. Will follow-up and re-attempt 1/29/18.

## 2018-01-28 NOTE — PROGRESS NOTES
[facility] PULMONARY/CRITICAL CARE    S: No new events overnight    Meds:  • Chlorhexidine Gluconate  15 mL Mouth/Throat [email or web address]   • furosemide  20 mg Intravenous BID (Diuretic)   • atorvastatin  80 mg Oral Nightly   • Venlafaxine HCl ER  75 mg Oral Daily BP: 96/58 100/56 100/56    Pulse: 68 68 67 63   Resp: 15 14 15 16   Temp:       TempSrc:       SpO2: 97% 97% 97% 97%   Weight:  210 lb 5.1 oz (95.4 kg)     Height:         Oxygen Therapy  SpO2: 97 %  O2 Device: Ventilator  FiO2 (%): 60 %  Pulse Oximetry wean off FIO2 and if OK will attempt vent weaning but may be 24 hours yet  -the Samara may be offering cardiovascular support as well and will need to watch CO and SvO2 as we reduce these interventions  2.  Aortic valve disease s/p SAVR  -per cards and CV  -st

## 2018-01-28 NOTE — OCCUPATIONAL THERAPY NOTE
Attempted to see pt for skilled OT services this date. Pt is currently intubated and not appropriate for therapy services. Will re-attempt 1/29/18.  Thiago Peralta, 01/28/18, 8:23 AM

## 2018-01-29 LAB
GLUCOSE BLD-MCNC: 109 MG/DL (ref 65–99)
GLUCOSE BLD-MCNC: 110 MG/DL (ref 65–99)
GLUCOSE BLD-MCNC: 116 MG/DL (ref 65–99)
GLUCOSE BLD-MCNC: 119 MG/DL (ref 65–99)
GLUCOSE BLD-MCNC: 131 MG/DL (ref 65–99)

## 2018-01-29 PROCEDURE — S0028 INJECTION, FAMOTIDINE, 20 MG: HCPCS | Performed by: THORACIC SURGERY (CARDIOTHORACIC VASCULAR SURGERY)

## 2018-01-29 PROCEDURE — 97162 PT EVAL MOD COMPLEX 30 MIN: CPT

## 2018-01-29 PROCEDURE — 97167 OT EVAL HIGH COMPLEX 60 MIN: CPT

## 2018-01-29 PROCEDURE — 97530 THERAPEUTIC ACTIVITIES: CPT

## 2018-01-29 PROCEDURE — 82962 GLUCOSE BLOOD TEST: CPT

## 2018-01-29 PROCEDURE — 97110 THERAPEUTIC EXERCISES: CPT

## 2018-01-29 RX ORDER — AMIODARONE HYDROCHLORIDE 200 MG/1
200 TABLET ORAL DAILY
Status: DISCONTINUED | OUTPATIENT
Start: 2018-01-29 | End: 2018-02-01

## 2018-01-29 NOTE — OCCUPATIONAL THERAPY NOTE
OCCUPATIONAL THERAPY EVALUATION - INPATIENT     Room Number: 7859/6023-C  Evaluation Date: 1/29/2018  Type of Evaluation: Initial  Presenting Problem: AVR    Physician Order: IP Consult to Occupational Therapy  Reason for Therapy: ADL/IADL Dysfunction and to go home. OBJECTIVE  Precautions: Sternal;Cardiac       WEIGHT BEARING RESTRICTION  Weight Bearing Restriction: None                PAIN ASSESSMENT  Rating: Unable to rate  Location: incisional  Management Techniques: Activity promotion; Body mechani ADDITIONAL TESTS                                    NEUROLOGICAL FINDINGS  Neurological Findings: Coordination - Finger to Nose  Coordination - Finger to Nose: Left decreased speed;Left decreased accuracy             ACTIVITY TOLERANCE   Liters of O2: occupational profile noted above. Functional outcome measures completed include AMPAC, and ROM.  In this OT evaluation patient presents with the following performance deficits: activity tolerance, strength, endurance, L UE coordination, L UE motor planning, ADL/IADL HIGH  5+ performance deficits    Client Assessment/Performance Deficits HIGH - Comorbidities and significant modifications of tasks    Clinical Decision Making HIGH - Analysis of occupational profile, comprehensive assessments, multiple treatment

## 2018-01-29 NOTE — PROGRESS NOTES
[facility]  Progress Note    Beth Saavedra Patient Status:  Inpatient    10/6/1960 MRN [id number]   [facility] 6NE-A Attending Salome Saha MD   Hosp Day # 3 PCP Joshua Olguin DO       Assessment/Plan:58 yo with AS admitted for Value Ref Range   ABG pH 7.38 7.35 - 7.45   ABG pCO2 39 35 - 45 mm Hg   ABG pO2 75 (L) 80 - 105 mm Hg   ABG HCO3 22.2 22.0 - 26.0 mEq/L   ABG Base Excess -2.7    ABG O2 Saturation 94 92 - 100 %   Calculated O2 Saturation 94 92 - 100 %   Patient Temperature

## 2018-01-29 NOTE — PLAN OF CARE
Problem: CARDIOVASCULAR - ADULT  Goal: Maintains optimal cardiac output and hemodynamic stability  INTERVENTIONS:  - Monitor vital signs, rhythm, and trends  - Monitor for bleeding, hypotension and signs of decreased cardiac output  - Evaluate effectivenes development  - Assess and document skin integrity  - Assess and document dressing/incision, wound bed, drain sites and surrounding tissue  - Implement wound care per orders  - Initiate isolation precautions as appropriate  - Initiate Pressure Ulcer prevent saying  \"I can't remember\". Strength noted to be equal. Moving all extremities. O/W neuro intact.

## 2018-01-29 NOTE — PHYSICAL THERAPY NOTE
PHYSICAL THERAPY EVALUATION - INPATIENT     Room Number: 9584/4490-I  Evaluation Date: 1/29/2018  Type of Evaluation: Initial  Physician Order: PT Eval and Treat    Presenting Problem: AVR 1/26  Reason for Therapy: Mobility Dysfunction and Discharge Pl session and inconsistently replies \"nothing\" or chest incision hurts when asked what is wrong    Patient self-stated goal is unable to state    OBJECTIVE  Precautions: Sternal;Cardiac  Fall Risk: High fall risk    WEIGHT BEARING RESTRICTION  Weight Beari -   Moving to and from a bed to a chair (including a wheelchair)?: A Lot   -   Need to walk in hospital room?: A Lot   -   Climbing 3-5 steps with a railing?: Total       AM-PAC Score:  Raw Score: 13   PT Approx Degree of Impairment Score: 64.91%   Stand measures completed include AMPAC. Based on this evaluation, patient's clinical presentation is unstable and overall the evaluation complexity is considered moderate.   These impairments and comorbidities manifest themselves as functional limitations in ind

## 2018-01-29 NOTE — PLAN OF CARE
Assumed care for this patient at 0730. Patient was alert and intubated. Weaned NO off with patient tolerating well. Patient did a CPAP trial and a t-piece trail and was extubated by 1200. CT removed per order, no pneumo noted on chest x-ray.  Tunica removed w

## 2018-01-29 NOTE — PROGRESS NOTES
Pulmonary Progress Note      NAME: Debbie Wallace - ROOM: 2187/3294-L - MRN: [id number] - Age: 59year old - : 10/6/1960    Assessment/Plan:  1. Acute respiratory failure due to hypoxia  -improved - off Samara and extubated.   Still with some dyspnea  -c ml   Output             2525 ml   Net             -775 ml       Physical Exam:  /55 (BP Location: Right arm)   Pulse 75   Temp 97.6 °F (36.4 °C) (Temporal)   Resp 21   Ht 5' 4\" (1.626 m)   Wt 205 lb 0.4 oz (93 kg)   SpO2 100%   BMI 35.19 kg/m²   Phy

## 2018-01-29 NOTE — PAYOR COMM NOTE
--------------  ADMISSION REVIEW     Payor: [facility]  Subscriber #:  [id number]  Authorization Number: [id number]    Admit date: 1/26/18  Admit time: 3291       Admitting Physician: Dany Mendieta MD  Attending Physician:  Dany Mendieta MD  Primary Care P ascending aorta and right atrium were cannulated after heparinization. Bypass was established. Patient was cooled to 26 degrees centigrade. The aorta was crossclamped.   Blood cardioplegia was given antegrade then retrograde to achieve electromechanical changes per Dr. Jackson Loop, see ABGs and documentation. Patient on dobutamine to keep CI>2, titrating off as tolerated. Insulin gtt per protocol. Patient woke up 1 hour after surgery and followed all commands.  Sedation increased and dilaudid continuous started  MG per tab 1 tablet     Date Action Dose Route User    1/29/2018 1304 Given 1 tablet Oral Nalini Vazquez RN    1/29/2018 2664 Given 1 tablet Oral Nalini Vazquez RN    1/29/2018 7615 Given 1 tablet Oral Sherryle Olp, RN      Insulin Aspart Pe 1/28/2018 1652 Given 4 mg Intravenous Uli Blakely, RN      PEG [facility]) powder packet 17 g     Date Action Dose Route User    1/29/2018 0840 Given 17 g Oral Judy PEACOCK RN          REVIEWER COMMENTS:     PLEASE REVIEW AND FAX ALL INPT DAY

## 2018-01-29 NOTE — PROGRESS NOTES
[facility] Cardiology  Progress Note    Renata Villafana Patient Status:  Inpatient    10/6/1960 MRN [id number]   [facility] 6NE-A Attending [place], MD   Hosp Day # 3 PCP Bong Lezama DO     Impression:  1.  Bicuspid AV w 1-25 Units 1-25 Units Subcutaneous Once   ipratropium-albuterol (DUONEB) nebulizer solution 3 mL 3 mL Nebulization Q4H PRN   acetaminophen (TYLENOL) tab 650 mg 650 mg Oral Q6H PRN   Or      acetaminophen (TYLENOL) 160 MG/5ML oral liquid 650 mg 650 mg Oral PRN   norepinephrine (LEVOPHED) 4 mg/250 ml premix infusion 0.5-30 mcg/min Intravenous Continuous PRN   Nitroprusside Sodium (NIPRIDE) 50 mg in dextrose 5 % 250 mL infusion 0.1-4 mcg/kg/min Intravenous Continuous PRN   aspirin EC tab 81 mg 81 mg Oral Daily

## 2018-01-29 NOTE — OPERATIVE REPORT
[facility]    PATIENT'S NAME: Dane Rip   ATTENDING PHYSICIAN: Zara Chavis MD   OPERATING PHYSICIAN: Zara Chavis MD   PATIENT ACCOUNT#:   [id number]    LOCATION:  [address] [place] [address]  MEDICAL RECORD #:   [id number]       YOB: 1960 aorta was opened through a transverse aortotomy. The valve was inspected and found to be densely calcified.   The valve was excised, and the tissue annulus was thoroughly debrided with care being taken to remove all calcific debris from the operative field

## 2018-01-29 NOTE — CM/SW NOTE
Sw met with pt and  to discuss eventual dc plans. PT/OT advise Acute Rehab. Discussed rehab recs with  and pt. Pt did not really participate in conversation- she mainly just nodded her head.   Explained that sw will talk to therapy tomorrow

## 2018-01-29 NOTE — PLAN OF CARE
Problem: Impaired Cognition  Goal: Patient will exhibit improved attention, thought processing and/or memory  Interventions:  - Consider use of a daily journal to improve memory and reduce confusion related to daily events and orientation  - Allow addition

## 2018-01-29 NOTE — PAYOR COMM NOTE
--------------  CONTINUED STAY REVIEW    Payor: [employer] [facility]  Subscriber #:  [id number]  Authorization Number: [id number]    Admit date: 1/26/18  Admit time: 5040    Admitting Physician: Godfrey Carranza MD  Attending Physician:  Godfrey Carranza MD  Primary Care Resp: 15 14 15 16   Temp:           TempSrc:           SpO2: 97% 97% 97% 97%     Oxygen Therapy  SpO2: 97 %  O2 Device: Ventilator  FiO2 (%): 60 %  Pulse Oximetry Type: Continuous  Ventilator Settings: AC 14// FIO2 60/PEEP 5  FiO2 (%): 60 %      La CPAP trial.  Hope to extubate today  - PAF now NSR.  amio gtt  - pain management  - low grade temps, cultures pending  - fluid overload, lasix increased  - deline   D/W Lucía Harrington  1/28/2018  7:43 AM    Weaned NO off with patient tolerating we Intravenous Herrera Swain RN    1/28/2018 1805 Given 40 mg Intravenous Vimal Davies RN      HYDROcodone-acetaminophen [facility])  MG per tab 1 tablet     Date Action Dose Route User    1/29/2018 1304 Given 1 tablet Oral Herrera Swain RN Bag 15 mcg/min Intravenous Andra Akers RN      ondansetron HCl [facility]) injection 4 mg     Date Action Dose Route User    1/28/2018 7959 Given 4 mg Intravenous Andra Akers RN      PEG 3350 (MIRALAX) powder packet 17 g     Date Action Dose R

## 2018-01-30 ENCOUNTER — APPOINTMENT (OUTPATIENT)
Dept: GENERAL RADIOLOGY | Facility: HOSPITAL | Age: 58
DRG: 219 | End: 2018-01-30
Attending: INTERNAL MEDICINE
Payer: COMMERCIAL

## 2018-01-30 LAB
ATRIAL RATE: 89 BPM
BLOOD TYPE BARCODE: 6200
GLUCOSE BLD-MCNC: 108 MG/DL (ref 65–99)
GLUCOSE BLD-MCNC: 115 MG/DL (ref 65–99)
GLUCOSE BLD-MCNC: 130 MG/DL (ref 65–99)
GLUCOSE BLD-MCNC: 136 MG/DL (ref 65–99)
GLUCOSE BLD-MCNC: 150 MG/DL (ref 65–99)
P AXIS: 48 DEGREES
P-R INTERVAL: 100 MS
Q-T INTERVAL: 406 MS
QRS DURATION: 84 MS
QTC CALCULATION (BEZET): 493 MS
R AXIS: -5 DEGREES
T AXIS: 72 DEGREES
VENTRICULAR RATE: 89 BPM

## 2018-01-30 PROCEDURE — 93005 ELECTROCARDIOGRAM TRACING: CPT

## 2018-01-30 PROCEDURE — 97530 THERAPEUTIC ACTIVITIES: CPT

## 2018-01-30 PROCEDURE — 94640 AIRWAY INHALATION TREATMENT: CPT

## 2018-01-30 PROCEDURE — 82962 GLUCOSE BLOOD TEST: CPT

## 2018-01-30 PROCEDURE — 94664 DEMO&/EVAL PT USE INHALER: CPT

## 2018-01-30 PROCEDURE — 97116 GAIT TRAINING THERAPY: CPT

## 2018-01-30 PROCEDURE — 93010 ELECTROCARDIOGRAM REPORT: CPT | Performed by: INTERNAL MEDICINE

## 2018-01-30 PROCEDURE — 97535 SELF CARE MNGMENT TRAINING: CPT

## 2018-01-30 PROCEDURE — 71045 X-RAY EXAM CHEST 1 VIEW: CPT | Performed by: INTERNAL MEDICINE

## 2018-01-30 RX ORDER — FUROSEMIDE 10 MG/ML
40 INJECTION INTRAMUSCULAR; INTRAVENOUS ONCE
Status: COMPLETED | OUTPATIENT
Start: 2018-01-30 | End: 2018-01-30

## 2018-01-30 RX ORDER — FUROSEMIDE 10 MG/ML
40 INJECTION INTRAMUSCULAR; INTRAVENOUS
Status: COMPLETED | OUTPATIENT
Start: 2018-01-31 | End: 2018-02-04

## 2018-01-30 RX ORDER — MAGNESIUM OXIDE 400 MG (241.3 MG MAGNESIUM) TABLET
1 TABLET NIGHTLY
Status: DISCONTINUED | OUTPATIENT
Start: 2018-01-30 | End: 2018-02-08

## 2018-01-30 NOTE — OCCUPATIONAL THERAPY NOTE
OCCUPATIONAL THERAPY TREATMENT NOTE - INPATIENT     Room Number: 8827/9326-C  Session: 1   Number of Visits to Meet Established Goals: 5    Presenting Problem: AVR    History related to current admission: Pt is s/p AVR and experienced pulmonary edema    Pr Toileting, which includes using toilet, bedpan or urinal? : A Lot  -   Putting on and taking off regular upper body clothing?: A Lot  -   Taking care of personal grooming such as brushing teeth?: A Little  -   Eating meals?: A Little    AM-PAC Score:  Scor conservation/work simplification techniques;ADL training;IADL training;Visual perceptual training;UE strengthening/ROM; Endurance training;Cognitive reorientation;Patient/Family education;Patient/Family training;Equipment eval/education; Compensatory techniq

## 2018-01-30 NOTE — PROGRESS NOTES
[facility]  Progress Note    Rod Costa Patient Status:  Inpatient    10/6/1960 MRN [id number]   [facility] 6NE-A Attending Godfrey Carranza MD   [address] Day # 4 PCP Harry Lizama DO       Assessment/Plan:56 yo with AS admitted for -EKG 12-LEAD   Collection Time: 01/30/18  6:08 AM   Result Value Ref Range   Ventricular rate 89 BPM   Atrial rate 89 BPM   P-R Interval 100 ms   QRS Duration 84 ms   Q-T Interval 406 ms   QTC Calculation (Bezet) 493 ms   P Axis 48 degrees   R Axis -5 de

## 2018-01-30 NOTE — PROGRESS NOTES
Pulmonary Progress Note      NAME: Debbie Garciaing - ROOM: 6104/9092-U - MRN: [id number] - Age: 59year old - : 10/6/1960    Assessment/Plan:  1.  Acute respiratory failure due to hypoxia  -improved  -CXR this am with more prominent pulm edema and effu supple without mass   Lungs: rales, ronchi and diminished at the left base   Chest wall: No tenderness or deformity. Heart: Regular rate and rhythm, normal S1S2, positive murmur. Abdomen: soft, non-tender, non-distended, positive BS.    Extremity: no ed

## 2018-01-30 NOTE — PROGRESS NOTES
[facility]  Progress Note    Guadalupe Madera Patient Status:  Inpatient    10/6/1960 MRN [id number]   [facility] 6NE-A Attending Rayo Brunson MD   Hosp Day # 4 PCP Yumi Hughes DO     Impression:  1.  Bicuspid AV w UNIT/ML flexpen 1-25 Units 1-25 Units Subcutaneous Once   ipratropium-albuterol (DUONEB) nebulizer solution 3 mL 3 mL Nebulization Q4H PRN   acetaminophen (TYLENOL) tab 650 mg 650 mg Oral Q6H PRN   Or      acetaminophen (TYLENOL) 160 MG/5ML oral liquid 650 docusate sodium (COLACE) liquid 100 mg 100 mg Oral BID   magnesium hydroxide (MILK OF MAGNESIA) 400 MG/5ML suspension 10 mL 10 mL Oral BID PRN   PEG 3350 (MIRALAX) powder packet 17 g 1 packet Oral Daily PRN   bisacodyl (DULCOLAX) rectal suppository 10 mg

## 2018-01-30 NOTE — PLAN OF CARE
Assumed pt care at 0730. Pt A/O x3. Forgetful. GUY. LUTHER has poor hand coordination and slightly more sluggish than YOBANY. See flowsheet. VSS. NSR. AV wires isolated and secured to chest. Midsternal incision steri strips intact, VIRGIE. Site care done.  Pt c/o mi

## 2018-01-30 NOTE — PHYSICAL THERAPY NOTE
PHYSICAL THERAPY TREATMENT NOTE - INPATIENT    Room Number: 8501/4206-E     Session: 1  Number of Visits to Meet Established Goals: 5    Presenting Problem: AVR 1/26   History related to current admission: admitted for AVR 1/26 due to stenosis with pulmon TOLERANCE  O2 Saturation: >90%  O2 Device: Nasal cannula  Liters of O2:  4  No shortness of breath    AM-PAC '6-Clicks' INPATIENT SHORT FORM - BASIC MOBILITY  How much difficulty does the patient currently have. ..  -   Turning over in bed (including adjust sternal precautions at end of session. Reviewed POC, HEP, and recommendations. Pt left with call light in reach. RN aware.      THERAPEUTIC EXERCISES  Lower Extremity Alternating marching  Ankle pumps  LAQ     Upper Extremity      Position Sitting     Repet independently recall sternal precautions   Goal #5     Goal #6     Goal Comments: Goals established on 1/29/2018; goals in progress 1/30/2018

## 2018-01-30 NOTE — PROGRESS NOTES
Scanned the chest with bedside US. There is only small amounts of fluid in the pleural spaces. Not enough to warrant a tap. Thoracentesis deferred.   Treating with lasix

## 2018-01-30 NOTE — PROGRESS NOTES
[facility]  CV Surgery Progress Note    Aiyana Redding Patient Status:  Inpatient    10/6/1960 MRN [id number]   [facility] 6NE-A Attending Addison Amin MD   [facility] Day # 4 PCP Saroj Lagunas DO     Subjective:  Pt is feeling ok this

## 2018-01-31 ENCOUNTER — APPOINTMENT (OUTPATIENT)
Dept: GENERAL RADIOLOGY | Facility: HOSPITAL | Age: 58
DRG: 219 | End: 2018-01-31
Attending: INTERNAL MEDICINE
Payer: COMMERCIAL

## 2018-01-31 LAB
GLUCOSE BLD-MCNC: 120 MG/DL (ref 65–99)
GLUCOSE BLD-MCNC: 134 MG/DL (ref 65–99)
GLUCOSE BLD-MCNC: 148 MG/DL (ref 65–99)
GLUCOSE BLD-MCNC: 168 MG/DL (ref 65–99)

## 2018-01-31 PROCEDURE — 71045 X-RAY EXAM CHEST 1 VIEW: CPT | Performed by: INTERNAL MEDICINE

## 2018-01-31 PROCEDURE — 97110 THERAPEUTIC EXERCISES: CPT

## 2018-01-31 PROCEDURE — 94640 AIRWAY INHALATION TREATMENT: CPT

## 2018-01-31 PROCEDURE — 97116 GAIT TRAINING THERAPY: CPT

## 2018-01-31 PROCEDURE — 97535 SELF CARE MNGMENT TRAINING: CPT

## 2018-01-31 PROCEDURE — 97530 THERAPEUTIC ACTIVITIES: CPT

## 2018-01-31 PROCEDURE — 82962 GLUCOSE BLOOD TEST: CPT

## 2018-01-31 PROCEDURE — S0028 INJECTION, FAMOTIDINE, 20 MG: HCPCS | Performed by: THORACIC SURGERY (CARDIOTHORACIC VASCULAR SURGERY)

## 2018-01-31 RX ORDER — MORPHINE SULFATE 2 MG/ML
4 INJECTION, SOLUTION INTRAMUSCULAR; INTRAVENOUS ONCE
Status: DISCONTINUED | OUTPATIENT
Start: 2018-01-31 | End: 2018-01-31

## 2018-01-31 RX ORDER — IPRATROPIUM BROMIDE AND ALBUTEROL SULFATE 2.5; .5 MG/3ML; MG/3ML
3 SOLUTION RESPIRATORY (INHALATION)
Status: DISCONTINUED | OUTPATIENT
Start: 2018-01-31 | End: 2018-02-06

## 2018-01-31 NOTE — PLAN OF CARE
Pt A&Ox4 to questions. Having appropriate conversations, still forgetful at times. Pt walking in halls with one assist and walker. Up out of bed for majority of the day. Pain treated with Norco successfully. Unable to wean oxygen, pt 80% on room air.  Dys

## 2018-01-31 NOTE — PROGRESS NOTES
[facility]  Progress Note    Renata Villafana Patient Status:  Inpatient    10/6/1960 MRN [id number]   [facility] 6NE-A Attending [place], MD   Hosp Day # 5 PCP Bong Lezama DO     Impression:  1.  Bicuspid AV w Intravenous BID (Diuretic)   amiodarone HCl (PACERONE) tab 200 mg 200 mg Oral Daily   Insulin Aspart Pen (NOVOLOG) 100 UNIT/ML flexpen 1-25 Units 1-25 Units Subcutaneous Once   acetaminophen (TYLENOL) tab 650 mg 650 mg Oral Q6H PRN   Or      acetaminophen (COLACE) cap 100 mg 100 mg Oral BID   Or      docusate sodium (COLACE) liquid 100 mg 100 mg Oral BID   magnesium hydroxide (MILK OF MAGNESIA) 400 MG/5ML suspension 10 mL 10 mL Oral BID PRN   PEG 3350 (MIRALAX) powder packet 17 g 1 packet Oral Daily PRN   b

## 2018-01-31 NOTE — CONSULTS
[address] Patient Status:  Inpatient    10/6/1960 MRN [id number]   [facility] 6NE-A Attending Gia Portillo MD   [facility] Day # 4 PCP Emi Beckman DO     Patient Identification  Sheldon Severin is a 59year old Diagnosis Date   • Carotid bruit present    • Depression    • Headache    • Heart murmur    • High blood pressure    • High cholesterol    • HYPERLIPIDEMIA    • Migraines    • Post-nasal drip    • Shortness of breath    • Valvular disease        Past Radha (DEX4) oral liquid 15 g 15 g Oral Q15 Min PRN   Or      Glucose-Vitamin C (DEX-4) 4-0.006 g chewable tab 4 tablet 4 tablet Oral Q15 Min PRN   Or      dextrose 50% injection 50 mL 50 mL Intravenous Q15 Min PRN   Or      glucose (DEX4) oral liquid 30 g 30 g BID PRN   PEG 3350 (MIRALAX) powder packet 17 g 1 packet Oral Daily PRN   bisacodyl (DULCOLAX) rectal suppository 10 mg 10 mg Rectal Daily PRN   ondansetron HCl (ZOFRAN) injection 4 mg 4 mg Intravenous Q6H PRN   famoTIDine (PEPCID) tab 20 mg 20 mg Oral BID breastfeeding.     Intake/Output Summary (Last 24 hours) at 01/30/18 1845  Last data filed at 01/30/18 1700   Gross per 24 hour   Intake                0 ml   Output             1800 ml   Net            -1800 ml       Physical Exam: valve replacement. Atrial flutter    Acute respiratory failure    Left pleural effusion    Volume overload    Incisional pain    Risk for infection. Risk for contractures and stiffness with consequent functional impairments.     Risk for thromboembolic d

## 2018-01-31 NOTE — PROGRESS NOTES
Seen and examined by Dr. Abhi Flores, POD #5 s/p AVR  Mentation slowly improving, continue to increase activity. C/o pain, morphine x 1 now, dose solumedrol, can transfer to CTU later today if pain improved.     Jay Camacho RN  Clinical Coordinator  CV Surgery

## 2018-01-31 NOTE — PROGRESS NOTES
[facility]  Progress Note    Duane Giron Patient Status:  Inpatient    10/6/1960 MRN [id number]   [facility] 6NE-A Attending Anil Crabtree MD   [address] Day # 5 PCP Mary Kay Staley DO       Assessment/Plan:56 yo with AS admitted for GLUCOSE   Collection Time: 01/31/18  7:28 AM   Result Value Ref Range   POC Glucose 120 (H) 65 - 99 mg/dL       Lab Results  Component Value Date   PGLU 120 01/31/2018     Recent Labs   Lab  01/30/18   1118  01/30/18   1126  01/30/18   1741  01/30/18   205

## 2018-01-31 NOTE — PHYSICAL THERAPY NOTE
PHYSICAL THERAPY TREATMENT NOTE - INPATIENT    Room Number: 6599/3864-F     Session: 2  Number of Visits to Meet Established Goals: 5    Presenting Problem: AVR 1/26   History related to current admission: admitted for AVR 1/26 due to stenosis with pulmon >90%  O2 Device: Nasal cannula  Liters of O2:  4  No shortness of breath    AM-PAC '6-Clicks' INPATIENT SHORT FORM - BASIC MOBILITY  How much difficulty does the patient currently have. ..  -   Turning over in bed (including adjusting bedclothes, sheets and call light in reach. RN aware.      THERAPEUTIC EXERCISES  Lower Extremity Alternating marching  Ankle pumps  LAQ     Upper Extremity      Position Sitting     Repetitions   10   Sets   1     Patient End of Session: Up in chair;Needs met;Call light within r established on 1/29/2018; goals in progress 1/31/2018

## 2018-01-31 NOTE — PROGRESS NOTES
Pulmonary Progress Note      NAME: Ayad Nevarez - ROOM: -Z - MRN: [id number] - Age: 59year old - : 10/6/1960    Assessment/Plan:  1. Hypoxia due to pulm edema  -wean O2 as tolerated  2.  Aortic valve disease s/p SAVR  -per cards and CV  -n clearer at bases   Chest wall: No tenderness or deformity. Heart: Regular rate and rhythm, normal S1S2, + murmur. Abdomen: soft, non-tender, non-distended, positive BS.    Extremity: no edema   Skin: no new rash   Neuro: more clear sensorium    No resul

## 2018-01-31 NOTE — OCCUPATIONAL THERAPY NOTE
OCCUPATIONAL THERAPY TREATMENT NOTE - INPATIENT     Room Number: 8848/7337-K  Session: 1   Number of Visits to Meet Established Goals: 5    Presenting Problem: AVR    History related to current admission: Pt is s/p AVR and experienced pulmonary edema    Pr includes using toilet, bedpan or urinal? : A Lot  -   Putting on and taking off regular upper body clothing?: A Lot  -   Taking care of personal grooming such as brushing teeth?: A Little  -   Eating meals?: None    AM-PAC Score:  Score: 15  Approx Degree baseline and would benefit from skilled inpatient OT to address the above deficits, maximizing patient's ability to return to prior level of function.     OT Discharge Recommendations: Acute rehabilitation  OT Device Recommendations: None    PLAN  OT Treatm

## 2018-02-01 ENCOUNTER — APPOINTMENT (OUTPATIENT)
Dept: ULTRASOUND IMAGING | Facility: HOSPITAL | Age: 58
DRG: 219 | End: 2018-02-01
Attending: INTERNAL MEDICINE
Payer: COMMERCIAL

## 2018-02-01 ENCOUNTER — APPOINTMENT (OUTPATIENT)
Dept: GENERAL RADIOLOGY | Facility: HOSPITAL | Age: 58
DRG: 219 | End: 2018-02-01
Attending: INTERNAL MEDICINE
Payer: COMMERCIAL

## 2018-02-01 LAB
ALLENS TEST: POSITIVE
ALLENS TEST: POSITIVE
ANTIBODY SCREEN: NEGATIVE
ARTERIAL BLD GAS O2 SATURATION: 86 % (ref 92–100)
ARTERIAL BLD GAS O2 SATURATION: 88 % (ref 92–100)
ARTERIAL BLOOD GAS BASE EXCESS: 2.5
ARTERIAL BLOOD GAS BASE EXCESS: 3.8
ARTERIAL BLOOD GAS HCO3: 27.5 MEQ/L (ref 22–26)
ARTERIAL BLOOD GAS HCO3: 28.9 MEQ/L (ref 22–26)
ARTERIAL BLOOD GAS PCO2: 45 MM HG (ref 35–45)
ARTERIAL BLOOD GAS PCO2: 47 MM HG (ref 35–45)
ARTERIAL BLOOD GAS PH: 7.41 (ref 7.35–7.45)
ARTERIAL BLOOD GAS PH: 7.41 (ref 7.35–7.45)
ARTERIAL BLOOD GAS PO2: 50 MM HG (ref 80–105)
ARTERIAL BLOOD GAS PO2: 61 MM HG (ref 80–105)
ATRIAL RATE: 47 BPM
BASOPHILS # BLD AUTO: 0.02 X10(3) UL (ref 0–0.1)
BASOPHILS NFR BLD AUTO: 0.1 %
BUN BLD-MCNC: 26 MG/DL (ref 8–20)
CALCIUM BLD-MCNC: 8.6 MG/DL (ref 8.3–10.3)
CALCULATED O2 SATURATION: 86 % (ref 92–100)
CALCULATED O2 SATURATION: 92 % (ref 92–100)
CARBOXYHEMOGLOBIN: 1.2 % SAT (ref 0–3)
CARBOXYHEMOGLOBIN: 1.3 % SAT (ref 0–3)
CHLORIDE: 100 MMOL/L (ref 101–111)
CO2: 32 MMOL/L (ref 22–32)
CREAT BLD-MCNC: 0.87 MG/DL (ref 0.55–1.02)
EOSINOPHIL # BLD AUTO: 0 X10(3) UL (ref 0–0.3)
EOSINOPHIL NFR BLD AUTO: 0 %
ERYTHROCYTE [DISTWIDTH] IN BLOOD BY AUTOMATED COUNT: 13.7 % (ref 11.5–16)
FIO2: 100 %
GLUCOSE BLD-MCNC: 154 MG/DL (ref 70–99)
GLUCOSE BLD-MCNC: 159 MG/DL (ref 65–99)
GLUCOSE BLD-MCNC: 164 MG/DL (ref 65–99)
GLUCOSE BLD-MCNC: 183 MG/DL (ref 65–99)
GLUCOSE BLD-MCNC: 190 MG/DL (ref 65–99)
HAV IGM SER QL: 2.5 MG/DL (ref 1.7–3)
HCT VFR BLD AUTO: 23.1 % (ref 34–50)
HGB BLD-MCNC: 7.7 G/DL (ref 12–16)
HGB BLD-MCNC: 7.9 G/DL (ref 12–16)
HGB BLD-MCNC: 8.7 G/DL (ref 12–16)
IMMATURE GRANULOCYTE COUNT: 0.17 X10(3) UL (ref 0–1)
IMMATURE GRANULOCYTE RATIO %: 0.9 %
IONIZED CALCIUM: 1.1 MMOL/L (ref 1.12–1.32)
L/M: 10 L/MIN
L/M: 40 L/MIN
LACTIC ACID ARTERIAL: 3 MMOL/L (ref 0.5–2)
LYMPHOCYTES # BLD AUTO: 1.16 X10(3) UL (ref 0.9–4)
LYMPHOCYTES NFR BLD AUTO: 6.1 %
MCH RBC QN AUTO: 30.8 PG (ref 27–33.2)
MCHC RBC AUTO-ENTMCNC: 33.3 G/DL (ref 31–37)
MCV RBC AUTO: 92.4 FL (ref 81–100)
METHEMOGLOBIN: 0.3 % SAT (ref 0.4–1.5)
METHEMOGLOBIN: 0.3 % SAT (ref 0.4–1.5)
MONOCYTES # BLD AUTO: 0.65 X10(3) UL (ref 0.1–0.6)
MONOCYTES NFR BLD AUTO: 3.4 %
NEUTROPHIL ABS PRELIM: 16.89 X10 (3) UL (ref 1.3–6.7)
NEUTROPHILS # BLD AUTO: 16.89 X10(3) UL (ref 1.3–6.7)
NEUTROPHILS NFR BLD AUTO: 89.5 %
P AXIS: 56 DEGREES
P-R INTERVAL: 142 MS
P/F RATIO: 238.8 MMHG
P/F RATIO: 61.2 MMHG
PATIENT TEMPERATURE: 98.4 F
PATIENT TEMPERATURE: 98.6 F
PLATELET # BLD AUTO: 207 10(3)UL (ref 150–450)
POTASSIUM BLOOD GAS: 3.6 MMOL/L (ref 3.6–5.1)
POTASSIUM SERPL-SCNC: 3.7 MMOL/L (ref 3.6–5.1)
POTASSIUM SERPL-SCNC: 3.8 MMOL/L (ref 3.6–5.1)
Q-T INTERVAL: 474 MS
QRS DURATION: 100 MS
QTC CALCULATION (BEZET): 419 MS
R AXIS: -5 DEGREES
RBC # BLD AUTO: 2.5 X10(6)UL (ref 3.8–5.1)
RED CELL DISTRIBUTION WIDTH-SD: 46.5 FL (ref 35.1–46.3)
RH BLOOD TYPE: POSITIVE
SODIUM BLOOD GAS: 139 MMOL/L (ref 136–144)
SODIUM SERPL-SCNC: 139 MMOL/L (ref 136–144)
T AXIS: 94 DEGREES
TOTAL HEMOGLOBIN: 7.5 G/DL (ref 11.7–16)
TOTAL HEMOGLOBIN: 8.9 G/DL (ref 11.7–16)
VENTRICULAR RATE: 47 BPM
WBC # BLD AUTO: 18.9 X10(3) UL (ref 4–13)

## 2018-02-01 PROCEDURE — 93970 EXTREMITY STUDY: CPT | Performed by: INTERNAL MEDICINE

## 2018-02-01 PROCEDURE — 83605 ASSAY OF LACTIC ACID: CPT | Performed by: INTERNAL MEDICINE

## 2018-02-01 PROCEDURE — 36600 WITHDRAWAL OF ARTERIAL BLOOD: CPT | Performed by: INTERNAL MEDICINE

## 2018-02-01 PROCEDURE — 82375 ASSAY CARBOXYHB QUANT: CPT | Performed by: INTERNAL MEDICINE

## 2018-02-01 PROCEDURE — 71045 X-RAY EXAM CHEST 1 VIEW: CPT | Performed by: INTERNAL MEDICINE

## 2018-02-01 PROCEDURE — 30233N1 TRANSFUSION OF NONAUTOLOGOUS RED BLOOD CELLS INTO PERIPHERAL VEIN, PERCUTANEOUS APPROACH: ICD-10-PCS | Performed by: THORACIC SURGERY (CARDIOTHORACIC VASCULAR SURGERY)

## 2018-02-01 PROCEDURE — 85018 HEMOGLOBIN: CPT | Performed by: INTERNAL MEDICINE

## 2018-02-01 PROCEDURE — 84295 ASSAY OF SERUM SODIUM: CPT | Performed by: INTERNAL MEDICINE

## 2018-02-01 PROCEDURE — 84132 ASSAY OF SERUM POTASSIUM: CPT | Performed by: INTERNAL MEDICINE

## 2018-02-01 PROCEDURE — 85018 HEMOGLOBIN: CPT | Performed by: THORACIC SURGERY (CARDIOTHORACIC VASCULAR SURGERY)

## 2018-02-01 PROCEDURE — 82803 BLOOD GASES ANY COMBINATION: CPT | Performed by: INTERNAL MEDICINE

## 2018-02-01 PROCEDURE — 80048 BASIC METABOLIC PNL TOTAL CA: CPT | Performed by: INTERNAL MEDICINE

## 2018-02-01 PROCEDURE — 83735 ASSAY OF MAGNESIUM: CPT | Performed by: INTERNAL MEDICINE

## 2018-02-01 PROCEDURE — 94660 CPAP INITIATION&MGMT: CPT

## 2018-02-01 PROCEDURE — 86900 BLOOD TYPING SEROLOGIC ABO: CPT | Performed by: PHYSICIAN ASSISTANT

## 2018-02-01 PROCEDURE — 36430 TRANSFUSION BLD/BLD COMPNT: CPT

## 2018-02-01 PROCEDURE — 86901 BLOOD TYPING SEROLOGIC RH(D): CPT | Performed by: PHYSICIAN ASSISTANT

## 2018-02-01 PROCEDURE — 85025 COMPLETE CBC W/AUTO DIFF WBC: CPT | Performed by: INTERNAL MEDICINE

## 2018-02-01 PROCEDURE — 93005 ELECTROCARDIOGRAM TRACING: CPT

## 2018-02-01 PROCEDURE — 93010 ELECTROCARDIOGRAM REPORT: CPT | Performed by: INTERNAL MEDICINE

## 2018-02-01 PROCEDURE — 5A09357 ASSISTANCE WITH RESPIRATORY VENTILATION, LESS THAN 24 CONSECUTIVE HOURS, CONTINUOUS POSITIVE AIRWAY PRESSURE: ICD-10-PCS | Performed by: THORACIC SURGERY (CARDIOTHORACIC VASCULAR SURGERY)

## 2018-02-01 PROCEDURE — 83050 HGB METHEMOGLOBIN QUAN: CPT | Performed by: INTERNAL MEDICINE

## 2018-02-01 PROCEDURE — 94640 AIRWAY INHALATION TREATMENT: CPT

## 2018-02-01 PROCEDURE — 86850 RBC ANTIBODY SCREEN: CPT | Performed by: PHYSICIAN ASSISTANT

## 2018-02-01 PROCEDURE — 86920 COMPATIBILITY TEST SPIN: CPT

## 2018-02-01 PROCEDURE — 82330 ASSAY OF CALCIUM: CPT | Performed by: INTERNAL MEDICINE

## 2018-02-01 PROCEDURE — 82962 GLUCOSE BLOOD TEST: CPT

## 2018-02-01 RX ORDER — POTASSIUM CHLORIDE 20 MEQ/1
40 TABLET, EXTENDED RELEASE ORAL ONCE
Status: COMPLETED | OUTPATIENT
Start: 2018-02-01 | End: 2018-02-01

## 2018-02-01 RX ORDER — DILTIAZEM HYDROCHLORIDE 5 MG/ML
INJECTION INTRAVENOUS
Status: COMPLETED
Start: 2018-02-01 | End: 2018-02-01

## 2018-02-01 RX ORDER — DILTIAZEM HYDROCHLORIDE 60 MG/1
60 TABLET, FILM COATED ORAL AS NEEDED
Status: DISCONTINUED | OUTPATIENT
Start: 2018-02-01 | End: 2018-02-08

## 2018-02-01 RX ORDER — SODIUM CHLORIDE 9 MG/ML
INJECTION, SOLUTION INTRAVENOUS ONCE
Status: COMPLETED | OUTPATIENT
Start: 2018-02-01 | End: 2018-02-01

## 2018-02-01 RX ORDER — DILTIAZEM HYDROCHLORIDE 5 MG/ML
10 INJECTION INTRAVENOUS
Status: COMPLETED | OUTPATIENT
Start: 2018-02-01 | End: 2018-02-04

## 2018-02-01 RX ORDER — DEXMEDETOMIDINE HYDROCHLORIDE 4 UG/ML
INJECTION, SOLUTION INTRAVENOUS CONTINUOUS
Status: DISCONTINUED | OUTPATIENT
Start: 2018-02-01 | End: 2018-02-04

## 2018-02-01 NOTE — PAYOR COMM NOTE
--------------  CONTINUED STAY REVIEW    Payor: [facility] [employer]  Subscriber #:  [id number]  Authorization Number: [id number]    Admit date: 1/26/18  Admit time: 0623    Admitting Physician: Marissa Phoenix MD  Attending Physician:  Marissa Phoenix MD  Primary Care Cardiology  1. Bicuspid AV with severe AS s/p SAVR (21mm Perez Inspiris valve)  2. Paroxysmal atrial flutter; noted prior to starting case  3. HL  4. Asthma  5. Acute hypoxic respiratory failure  6.  Delirium; suspect postperfusion syndrome; improving  7 2/1/2018 0707 Given 1 tablet Oral Damir Glass RN      HYDROcodone-acetaminophen [facility])  MG per tab 2 tablet     Date Action Dose Route User    1/31/2018 2217 Given 2 tablet Oral Damir Glass RN    1/31/2018 1607 Given 2 tablet Oral Ada Rajput

## 2018-02-01 NOTE — PROGRESS NOTES
[address] Patient Status:  Inpatient    10/6/1960 MRN [id number]   [facility] 6NE-A Attending Sathish Hyde MD   [facility] Day # 6 PCP Abner Albert DO     Pulm / Critical Care Progress Note     S: Pt states she feel oz (96 kg)     Height:            O2: 2-3L nc      Wt Readings from Last 3 Encounters:  02/01/18 : 211 lb 10.3 oz (96 kg)  12/19/17 : 206 lb 1 oz (93.5 kg)  12/11/17 : 212 lb (96.2 kg)       I/O last 3 completed shifts: In: 2802 [P.O.:1120;  I.V.:1682]  Ladonna Peterson AM      Addendum:   Pt developed a fib with rvr around 1115; subsequent hypoxia. Switched to HFNC.  abg confirmed hypoxia; sats have been difficult to obtain due to acrylic nail polish.    Lungs: slightly coarse  Heart: RRR (converted back to sr shortly pr

## 2018-02-01 NOTE — PROGRESS NOTES
[facility]  CV Surgery Progress Note    Rochelle Man Patient Status:  Inpatient    10/6/1960 MRN [id number]   [facility] 6NE-A Attending Trisha Ferrer MD   [facility] Day # 6 PCP Sharif Wolf DO     Subjective:  Doing ok. SOB.  Flor Beverly

## 2018-02-01 NOTE — PROGRESS NOTES
[facility]  Progress Note    Chasgabrielle Lanes Patient Status:  Inpatient    10/6/1960 MRN [id number]   [facility] 6NE-A Attending Kelsy Arboleda MD   Hosp Day # 6 PCP Debra Toure DO     Impression:  1.  Bicuspid AV w mg 40 mg Intravenous BID (Diuretic)   Insulin Aspart Pen (NOVOLOG) 100 UNIT/ML flexpen 1-25 Units 1-25 Units Subcutaneous Once   acetaminophen (TYLENOL) tab 650 mg 650 mg Oral Q6H PRN   Or      acetaminophen (TYLENOL) 160 MG/5ML oral liquid 650 mg 650 mg O sodium (COLACE) liquid 100 mg 100 mg Oral BID   magnesium hydroxide (MILK OF MAGNESIA) 400 MG/5ML suspension 10 mL 10 mL Oral BID PRN   PEG 3350 (MIRALAX) powder packet 17 g 1 packet Oral Daily PRN   bisacodyl (DULCOLAX) rectal suppository 10 mg 10 mg Rect

## 2018-02-01 NOTE — OCCUPATIONAL THERAPY NOTE
Attempted to see pt this AM, pt getting ready to get blood transfusion, per RN OT should follow up later, OT will follow up as schedule permits.

## 2018-02-01 NOTE — PLAN OF CARE
CARDIOVASCULAR - ADULT    • Maintains optimal cardiac output and hemodynamic stability Progressing        RESPIRATORY - ADULT    • Achieves optimal ventilation and oxygenation Progressing          Assumed patient care last evening at 1900.  Patient is alert

## 2018-02-01 NOTE — PROGRESS NOTES
[facility]  Progress Note    Guadalupe Madera Patient Status:  Inpatient    10/6/1960 MRN [id number]   [facility] 6NE-A Attending Rayo Brunson MD   [address] Day # 6 PCP Yumi Hughes DO       Assessment/Plan:56 yo with AS admitted for Result Value Ref Range   Ventricular rate 47 BPM   Atrial rate 47 BPM   P-R Interval 142 ms   QRS Duration 100 ms   Q-T Interval 474 ms   QTC Calculation (Bezet) 419 ms   P Axis 56 degrees   R Axis -5 degrees   T Axis 94 degrees   -BASIC METABOLIC PANEL

## 2018-02-01 NOTE — CM/SW NOTE
SW noted PMR evaluation completed 01/30 with recommendation for acute rehab. D/c plan to [facility] acute rehab.

## 2018-02-02 ENCOUNTER — APPOINTMENT (OUTPATIENT)
Dept: GENERAL RADIOLOGY | Facility: HOSPITAL | Age: 58
DRG: 219 | End: 2018-02-02
Attending: INTERNAL MEDICINE
Payer: COMMERCIAL

## 2018-02-02 ENCOUNTER — APPOINTMENT (OUTPATIENT)
Dept: GENERAL RADIOLOGY | Facility: HOSPITAL | Age: 58
DRG: 219 | End: 2018-02-02
Attending: THORACIC SURGERY (CARDIOTHORACIC VASCULAR SURGERY)
Payer: COMMERCIAL

## 2018-02-02 LAB
ADENOVIRUS PCR:: NEGATIVE
ALLENS TEST: POSITIVE
ALLENS TEST: POSITIVE
ARTERIAL BLD GAS O2 SATURATION: 89 % (ref 92–100)
ARTERIAL BLD GAS O2 SATURATION: 97 % (ref 92–100)
ARTERIAL BLOOD GAS BASE EXCESS: 3.7
ARTERIAL BLOOD GAS BASE EXCESS: 6
ARTERIAL BLOOD GAS HCO3: 28.7 MEQ/L (ref 22–26)
ARTERIAL BLOOD GAS HCO3: 30.4 MEQ/L (ref 22–26)
ARTERIAL BLOOD GAS PCO2: 42 MM HG (ref 35–45)
ARTERIAL BLOOD GAS PCO2: 46 MM HG (ref 35–45)
ARTERIAL BLOOD GAS PH: 7.41 (ref 7.35–7.45)
ARTERIAL BLOOD GAS PH: 7.48 (ref 7.35–7.45)
ARTERIAL BLOOD GAS PO2: 110 MM HG (ref 80–105)
ARTERIAL BLOOD GAS PO2: 56 MM HG (ref 80–105)
B PERT DNA SPEC QL NAA+PROBE: NEGATIVE
BASOPHILS # BLD AUTO: 0.02 X10(3) UL (ref 0–0.1)
BASOPHILS NFR BLD AUTO: 0.1 %
BLOOD TYPE BARCODE: 6200
BUN BLD-MCNC: 41 MG/DL (ref 8–20)
C PNEUM DNA SPEC QL NAA+PROBE: NEGATIVE
CALCIUM BLD-MCNC: 8.6 MG/DL (ref 8.3–10.3)
CALCULATED O2 SATURATION: 92 % (ref 92–100)
CALCULATED O2 SATURATION: 98 % (ref 92–100)
CARBOXYHEMOGLOBIN: 1 % SAT (ref 0–3)
CARBOXYHEMOGLOBIN: 1.1 % SAT (ref 0–3)
CHLORIDE: 100 MMOL/L (ref 101–111)
CO2: 30 MMOL/L (ref 22–32)
CORONAVIRUS 229E PCR:: NEGATIVE
CORONAVIRUS HKU1 PCR:: NEGATIVE
CORONAVIRUS NL63 PCR:: NEGATIVE
CORONAVIRUS OC43 PCR:: NEGATIVE
CREAT BLD-MCNC: 1 MG/DL (ref 0.55–1.02)
EOSINOPHIL # BLD AUTO: 0 X10(3) UL (ref 0–0.3)
EOSINOPHIL NFR BLD AUTO: 0 %
ERYTHROCYTE [DISTWIDTH] IN BLOOD BY AUTOMATED COUNT: 15 % (ref 11.5–16)
EXPIRATORY PRESSURE: 10 CM H2O
EXPIRATORY PRESSURE: 10 CM H2O
FIO2: 100 %
FIO2: 90 %
FLUAV RNA SPEC QL NAA+PROBE: NEGATIVE
FLUBV RNA SPEC QL NAA+PROBE: NEGATIVE
FREE T4: 1.1 NG/DL (ref 0.9–1.8)
GLUCOSE BLD-MCNC: 129 MG/DL (ref 70–99)
GLUCOSE BLD-MCNC: 133 MG/DL (ref 65–99)
GLUCOSE BLD-MCNC: 133 MG/DL (ref 65–99)
GLUCOSE BLD-MCNC: 155 MG/DL (ref 65–99)
HAV IGM SER QL: 2.5 MG/DL (ref 1.7–3)
HCT VFR BLD AUTO: 24.4 % (ref 34–50)
HGB BLD-MCNC: 8.3 G/DL (ref 12–16)
IMMATURE GRANULOCYTE COUNT: 0.23 X10(3) UL (ref 0–1)
IMMATURE GRANULOCYTE RATIO %: 1.1 %
IONIZED CALCIUM: 1.1 MMOL/L (ref 1.12–1.32)
IONIZED CALCIUM: 1.13 MMOL/L (ref 1.12–1.32)
LACTIC ACID ARTERIAL: 1.6 MMOL/L (ref 0.5–2)
LACTIC ACID ARTERIAL: 1.9 MMOL/L (ref 0.5–2)
LYMPHOCYTES # BLD AUTO: 1.61 X10(3) UL (ref 0.9–4)
LYMPHOCYTES NFR BLD AUTO: 8 %
MCH RBC QN AUTO: 30.7 PG (ref 27–33.2)
MCHC RBC AUTO-ENTMCNC: 34 G/DL (ref 31–37)
MCV RBC AUTO: 90.4 FL (ref 81–100)
METAPNEUMOVIRUS PCR:: NEGATIVE
METHEMOGLOBIN: 0.2 % SAT (ref 0.4–1.5)
METHEMOGLOBIN: 0.3 % SAT (ref 0.4–1.5)
MONOCYTES # BLD AUTO: 1.32 X10(3) UL (ref 0.1–0.6)
MONOCYTES NFR BLD AUTO: 6.5 %
MYCOPLASMA PNEUMONIA PCR:: NEGATIVE
NEUTROPHIL ABS PRELIM: 17.04 X10 (3) UL (ref 1.3–6.7)
NEUTROPHILS # BLD AUTO: 17.04 X10(3) UL (ref 1.3–6.7)
NEUTROPHILS NFR BLD AUTO: 84.3 %
P/F RATIO: 109.7 MMHG
P/F RATIO: 276.6 MMHG
PARAINFLUENZA 1 PCR:: NEGATIVE
PARAINFLUENZA 2 PCR:: NEGATIVE
PARAINFLUENZA 3 PCR:: NEGATIVE
PARAINFLUENZA 4 PCR:: NEGATIVE
PATIENT TEMPERATURE: 97.4 F
PATIENT TEMPERATURE: 98.6 F
PHOSPHATE SERPL-MCNC: 3.5 MG/DL (ref 2.5–4.9)
PLATELET # BLD AUTO: 241 10(3)UL (ref 150–450)
POTASSIUM BLOOD GAS: 3.6 MMOL/L (ref 3.6–5.1)
POTASSIUM BLOOD GAS: 3.7 MMOL/L (ref 3.6–5.1)
POTASSIUM SERPL-SCNC: 3.6 MMOL/L (ref 3.6–5.1)
PROCALCITONIN SERPL-MCNC: 0.27 NG/ML (ref ?–0.11)
RBC # BLD AUTO: 2.7 X10(6)UL (ref 3.8–5.1)
RED CELL DISTRIBUTION WIDTH-SD: 49.3 FL (ref 35.1–46.3)
RHINOVIRUS/ENTERO PCR:: NEGATIVE
RSV RNA SPEC QL NAA+PROBE: NEGATIVE
SODIUM BLOOD GAS: 139 MMOL/L (ref 136–144)
SODIUM BLOOD GAS: 139 MMOL/L (ref 136–144)
SODIUM SERPL-SCNC: 139 MMOL/L (ref 136–144)
TIDAL VOLUME: 400 ML
TIDAL VOLUME: 400 ML
TOTAL HEMOGLOBIN: 8.7 G/DL (ref 11.7–16)
TOTAL HEMOGLOBIN: 9.4 G/DL (ref 11.7–16)
TRIGL SERPL-MCNC: 111 MG/DL (ref ?–150)
TSI SER-ACNC: 1.16 MIU/ML (ref 0.35–5.5)
VENT RATE: 18 /MIN
VENT RATE: 18 /MIN
WBC # BLD AUTO: 20.2 X10(3) UL (ref 4–13)

## 2018-02-02 PROCEDURE — 85018 HEMOGLOBIN: CPT | Performed by: INTERNAL MEDICINE

## 2018-02-02 PROCEDURE — 36620 INSERTION CATHETER ARTERY: CPT

## 2018-02-02 PROCEDURE — 05H633Z INSERTION OF INFUSION DEVICE INTO LEFT SUBCLAVIAN VEIN, PERCUTANEOUS APPROACH: ICD-10-PCS | Performed by: THORACIC SURGERY (CARDIOTHORACIC VASCULAR SURGERY)

## 2018-02-02 PROCEDURE — 83605 ASSAY OF LACTIC ACID: CPT | Performed by: THORACIC SURGERY (CARDIOTHORACIC VASCULAR SURGERY)

## 2018-02-02 PROCEDURE — 84295 ASSAY OF SERUM SODIUM: CPT | Performed by: THORACIC SURGERY (CARDIOTHORACIC VASCULAR SURGERY)

## 2018-02-02 PROCEDURE — 82375 ASSAY CARBOXYHB QUANT: CPT | Performed by: INTERNAL MEDICINE

## 2018-02-02 PROCEDURE — S0028 INJECTION, FAMOTIDINE, 20 MG: HCPCS | Performed by: THORACIC SURGERY (CARDIOTHORACIC VASCULAR SURGERY)

## 2018-02-02 PROCEDURE — 94640 AIRWAY INHALATION TREATMENT: CPT

## 2018-02-02 PROCEDURE — 84100 ASSAY OF PHOSPHORUS: CPT | Performed by: INTERNAL MEDICINE

## 2018-02-02 PROCEDURE — 84132 ASSAY OF SERUM POTASSIUM: CPT | Performed by: THORACIC SURGERY (CARDIOTHORACIC VASCULAR SURGERY)

## 2018-02-02 PROCEDURE — 85018 HEMOGLOBIN: CPT | Performed by: THORACIC SURGERY (CARDIOTHORACIC VASCULAR SURGERY)

## 2018-02-02 PROCEDURE — 87486 CHLMYD PNEUM DNA AMP PROBE: CPT | Performed by: INTERNAL MEDICINE

## 2018-02-02 PROCEDURE — 82803 BLOOD GASES ANY COMBINATION: CPT | Performed by: THORACIC SURGERY (CARDIOTHORACIC VASCULAR SURGERY)

## 2018-02-02 PROCEDURE — 5A1955Z RESPIRATORY VENTILATION, GREATER THAN 96 CONSECUTIVE HOURS: ICD-10-PCS | Performed by: INTERNAL MEDICINE

## 2018-02-02 PROCEDURE — 82962 GLUCOSE BLOOD TEST: CPT

## 2018-02-02 PROCEDURE — 71045 X-RAY EXAM CHEST 1 VIEW: CPT | Performed by: THORACIC SURGERY (CARDIOTHORACIC VASCULAR SURGERY)

## 2018-02-02 PROCEDURE — 71045 X-RAY EXAM CHEST 1 VIEW: CPT | Performed by: INTERNAL MEDICINE

## 2018-02-02 PROCEDURE — 84132 ASSAY OF SERUM POTASSIUM: CPT | Performed by: INTERNAL MEDICINE

## 2018-02-02 PROCEDURE — 0BH17EZ INSERTION OF ENDOTRACHEAL AIRWAY INTO TRACHEA, VIA NATURAL OR ARTIFICIAL OPENING: ICD-10-PCS | Performed by: ANESTHESIOLOGY

## 2018-02-02 PROCEDURE — 84145 PROCALCITONIN (PCT): CPT | Performed by: INTERNAL MEDICINE

## 2018-02-02 PROCEDURE — 83050 HGB METHEMOGLOBIN QUAN: CPT | Performed by: INTERNAL MEDICINE

## 2018-02-02 PROCEDURE — 87633 RESP VIRUS 12-25 TARGETS: CPT | Performed by: INTERNAL MEDICINE

## 2018-02-02 PROCEDURE — 87999 UNLISTED MICROBIOLOGY PX: CPT

## 2018-02-02 PROCEDURE — 31500 INSERT EMERGENCY AIRWAY: CPT

## 2018-02-02 PROCEDURE — 83050 HGB METHEMOGLOBIN QUAN: CPT | Performed by: THORACIC SURGERY (CARDIOTHORACIC VASCULAR SURGERY)

## 2018-02-02 PROCEDURE — 85025 COMPLETE CBC W/AUTO DIFF WBC: CPT | Performed by: INTERNAL MEDICINE

## 2018-02-02 PROCEDURE — B547ZZA ULTRASONOGRAPHY OF LEFT SUBCLAVIAN VEIN, GUIDANCE: ICD-10-PCS | Performed by: THORACIC SURGERY (CARDIOTHORACIC VASCULAR SURGERY)

## 2018-02-02 PROCEDURE — 94660 CPAP INITIATION&MGMT: CPT

## 2018-02-02 PROCEDURE — 87205 SMEAR GRAM STAIN: CPT | Performed by: INTERNAL MEDICINE

## 2018-02-02 PROCEDURE — 94002 VENT MGMT INPAT INIT DAY: CPT

## 2018-02-02 PROCEDURE — 83605 ASSAY OF LACTIC ACID: CPT | Performed by: INTERNAL MEDICINE

## 2018-02-02 PROCEDURE — 84439 ASSAY OF FREE THYROXINE: CPT | Performed by: INTERNAL MEDICINE

## 2018-02-02 PROCEDURE — 36600 WITHDRAWAL OF ARTERIAL BLOOD: CPT | Performed by: THORACIC SURGERY (CARDIOTHORACIC VASCULAR SURGERY)

## 2018-02-02 PROCEDURE — 87581 M.PNEUMON DNA AMP PROBE: CPT | Performed by: INTERNAL MEDICINE

## 2018-02-02 PROCEDURE — 36600 WITHDRAWAL OF ARTERIAL BLOOD: CPT | Performed by: INTERNAL MEDICINE

## 2018-02-02 PROCEDURE — 36569 INSJ PICC 5 YR+ W/O IMAGING: CPT

## 2018-02-02 PROCEDURE — 82803 BLOOD GASES ANY COMBINATION: CPT | Performed by: INTERNAL MEDICINE

## 2018-02-02 PROCEDURE — 84295 ASSAY OF SERUM SODIUM: CPT | Performed by: INTERNAL MEDICINE

## 2018-02-02 PROCEDURE — 82330 ASSAY OF CALCIUM: CPT | Performed by: INTERNAL MEDICINE

## 2018-02-02 PROCEDURE — 80048 BASIC METABOLIC PNL TOTAL CA: CPT | Performed by: INTERNAL MEDICINE

## 2018-02-02 PROCEDURE — 84478 ASSAY OF TRIGLYCERIDES: CPT | Performed by: INTERNAL MEDICINE

## 2018-02-02 PROCEDURE — 83735 ASSAY OF MAGNESIUM: CPT | Performed by: INTERNAL MEDICINE

## 2018-02-02 PROCEDURE — 87070 CULTURE OTHR SPECIMN AEROBIC: CPT | Performed by: INTERNAL MEDICINE

## 2018-02-02 PROCEDURE — 82375 ASSAY CARBOXYHB QUANT: CPT | Performed by: THORACIC SURGERY (CARDIOTHORACIC VASCULAR SURGERY)

## 2018-02-02 PROCEDURE — 76937 US GUIDE VASCULAR ACCESS: CPT

## 2018-02-02 PROCEDURE — 87798 DETECT AGENT NOS DNA AMP: CPT | Performed by: INTERNAL MEDICINE

## 2018-02-02 PROCEDURE — 51702 INSERT TEMP BLADDER CATH: CPT

## 2018-02-02 PROCEDURE — 84443 ASSAY THYROID STIM HORMONE: CPT | Performed by: INTERNAL MEDICINE

## 2018-02-02 PROCEDURE — 82330 ASSAY OF CALCIUM: CPT | Performed by: THORACIC SURGERY (CARDIOTHORACIC VASCULAR SURGERY)

## 2018-02-02 RX ORDER — BISACODYL 10 MG
10 SUPPOSITORY, RECTAL RECTAL
Status: DISCONTINUED | OUTPATIENT
Start: 2018-02-02 | End: 2018-02-02

## 2018-02-02 RX ORDER — DEXMEDETOMIDINE HYDROCHLORIDE 4 UG/ML
INJECTION, SOLUTION INTRAVENOUS CONTINUOUS
Status: DISCONTINUED | OUTPATIENT
Start: 2018-02-02 | End: 2018-02-02

## 2018-02-02 RX ORDER — ACETAMINOPHEN 160 MG/5ML
650 SOLUTION ORAL EVERY 6 HOURS PRN
Status: DISCONTINUED | OUTPATIENT
Start: 2018-02-02 | End: 2018-02-08

## 2018-02-02 RX ORDER — ACETAMINOPHEN 160 MG/5ML
650 SOLUTION ORAL EVERY 6 HOURS PRN
Status: DISCONTINUED | OUTPATIENT
Start: 2018-02-02 | End: 2018-02-02

## 2018-02-02 RX ORDER — SODIUM CHLORIDE 0.9 % (FLUSH) 0.9 %
10 SYRINGE (ML) INJECTION EVERY 12 HOURS
Status: DISCONTINUED | OUTPATIENT
Start: 2018-02-02 | End: 2018-02-08

## 2018-02-02 RX ORDER — POLYETHYLENE GLYCOL 3350 17 G/17G
17 POWDER, FOR SOLUTION ORAL DAILY PRN
Status: DISCONTINUED | OUTPATIENT
Start: 2018-02-02 | End: 2018-02-02

## 2018-02-02 RX ORDER — MIDAZOLAM HYDROCHLORIDE 1 MG/ML
INJECTION INTRAMUSCULAR; INTRAVENOUS
Status: DISPENSED
Start: 2018-02-02 | End: 2018-02-02

## 2018-02-02 RX ORDER — CHLORHEXIDINE GLUCONATE 0.12 MG/ML
15 RINSE ORAL
Status: DISCONTINUED | OUTPATIENT
Start: 2018-02-02 | End: 2018-02-04

## 2018-02-02 RX ORDER — SODIUM PHOSPHATE, DIBASIC AND SODIUM PHOSPHATE, MONOBASIC 7; 19 G/133ML; G/133ML
1 ENEMA RECTAL ONCE AS NEEDED
Status: DISCONTINUED | OUTPATIENT
Start: 2018-02-02 | End: 2018-02-08

## 2018-02-02 RX ORDER — CHLORHEXIDINE GLUCONATE 0.12 MG/ML
15 RINSE ORAL
Status: DISCONTINUED | OUTPATIENT
Start: 2018-02-02 | End: 2018-02-02

## 2018-02-02 RX ORDER — ACETAMINOPHEN 325 MG/1
650 TABLET ORAL EVERY 6 HOURS PRN
Status: DISCONTINUED | OUTPATIENT
Start: 2018-02-02 | End: 2018-02-02

## 2018-02-02 RX ORDER — ACETAMINOPHEN 650 MG/1
650 SUPPOSITORY RECTAL EVERY 6 HOURS PRN
Status: DISCONTINUED | OUTPATIENT
Start: 2018-02-02 | End: 2018-02-08

## 2018-02-02 RX ORDER — ACETAMINOPHEN 650 MG/1
650 SUPPOSITORY RECTAL EVERY 6 HOURS PRN
Status: DISCONTINUED | OUTPATIENT
Start: 2018-02-02 | End: 2018-02-02

## 2018-02-02 RX ORDER — SODIUM PHOSPHATE, DIBASIC AND SODIUM PHOSPHATE, MONOBASIC 7; 19 G/133ML; G/133ML
1 ENEMA RECTAL ONCE AS NEEDED
Status: DISCONTINUED | OUTPATIENT
Start: 2018-02-02 | End: 2018-02-06 | Stop reason: ALTCHOICE

## 2018-02-02 RX ORDER — ETOMIDATE 2 MG/ML
INJECTION INTRAVENOUS
Status: COMPLETED
Start: 2018-02-02 | End: 2018-02-02

## 2018-02-02 RX ORDER — ACETAMINOPHEN 325 MG/1
650 TABLET ORAL EVERY 6 HOURS PRN
Status: DISCONTINUED | OUTPATIENT
Start: 2018-02-02 | End: 2018-02-08

## 2018-02-02 RX ORDER — MIDAZOLAM HYDROCHLORIDE 1 MG/ML
2 INJECTION INTRAMUSCULAR; INTRAVENOUS EVERY 5 MIN PRN
Status: DISCONTINUED | OUTPATIENT
Start: 2018-02-02 | End: 2018-02-06 | Stop reason: ALTCHOICE

## 2018-02-02 NOTE — PROGRESS NOTES
[facility]  CV Surgery Progress Note    Cesar Ring Patient Status:  Inpatient    10/6/1960 MRN [id number]   [facility] 6NE-A Attending Gretta Godwin MD   [address] Day # 7 PCP Yisel Fuller DO     Subjective:  Events of the morning

## 2018-02-02 NOTE — OCCUPATIONAL THERAPY NOTE
Pt was being followed by Occupational Therapy. Pt is now vented and d/t status change pt will be placed on hold. New orders are required if pt becomes appropriate. RN in agreement.

## 2018-02-02 NOTE — PROGRESS NOTES
[facility]  Progress Note    Cesar Ring Patient Status:  Inpatient    10/6/1960 MRN [id number]   [facility] 6NE-A Attending Gretta Godwin MD   [facility] Day # 7 PCP Yisel Fuller DO       SUBJECTIVE:  Worsening anxiety, tachycardia premix/add-vantage 2.5-20 mg/hr Intravenous Continuous   DilTIAZem HCl (CARDIZEM) tab 60 mg 60 mg Oral PRN   amiodarone HCl in Dextrose (CORDARONE) 360 MG/200ML premix infusion 0.5 mg/min Intravenous Continuous   Dexmedetomidine HCl in NaCl (PRECEDEX) 400 sulfate (PF) 2 MG/ML injection 8 mg 8 mg Intravenous Q1H PRN   DOBUTamine in D5W (DOBUTREX) 500 mg/250 ml infusion 2.5-40 mcg/kg/min Intravenous Continuous PRN   nitroGLYCERIN infusion 50mg in D5W 250ml 5-300 mcg/min Intravenous Continuous PRN   norepineph T4    5.  Multiple thyroid nodules - US done 1/2016  - FNA done 2/5/16 benign   - check thyroid labs   - will need follow up on nodules       Plan of care discussed with patient/family at bedside. All questions/concerns addressed as fully as possible.

## 2018-02-02 NOTE — PROGRESS NOTES
[facility]    Patients Name: Dionicio Tavares  Attending Physician: Tavia Perry MD  CSN: [id number]   Location:  0205/2779-G  MRN: [id number]    YOB: 1960  Admission Date: 1/26/2018     Endotracheal Intubation Procedure Note    Call

## 2018-02-02 NOTE — CONSULTS
[facility]    PATIENT'S NAME: Lucinda Ovalle   ATTENDING PHYSICIAN: Charity Solis MD   CONSULTING PHYSICIAN: Ger Guy M.D.    PATIENT ACCOUNT#:   [de-identified]    LOCATION:  [address] [place] [address]  MEDICAL RECORD #:   [id number]       DATE OF BIRTH:  10 pressure is 92/73, pulse is 80 and regular. HEENT:  Head is normocephalic. Eyes are nonicteric. NECK:  No JVD, but it is full and difficult to evaluate. Thyroid is small. LUNGS:  Bilateral wheezing. HEART:  Regular rate and rhythm.   No significant mu

## 2018-02-02 NOTE — PLAN OF CARE
CARDIOVASCULAR - ADULT    • Maintains optimal cardiac output and hemodynamic stability Not Progressing    • Absence of cardiac arrhythmias or at baseline Not Progressing        Impaired Cognition    • Patient will exhibit improved attention, thought proces

## 2018-02-02 NOTE — PLAN OF CARE
Care asumed @ 0730. Remains in SB in 40-50's, pale, diaphoretic,restless, inappropriate thought processing (wants to get up & walk when HR in 180'S) Unable to obtain adeq pleth when pulse Ox sites rotated to foot, ear, and nose.  Eventually placed on forehe

## 2018-02-02 NOTE — RESPIRATORY THERAPY NOTE
STARTED VAPOTHERM, CEREBRAL SPO2 RANGING FROM 85%-88%. STARTED ON VAPOTHERM 35L 100% AND SHE SAID SHE FELT A LOT BETTER. SPO2 93-96%. LATER INCREASED HER TO 40L 100% BECAUSE SHE WENT FROM THE CHAIR TO THE BED AND WAS WINDED AND SPO2 <88% PER BRUNO RT.  WILL

## 2018-02-02 NOTE — PHYSICAL THERAPY NOTE
Attempted to see Pt this am, however, Pt just intubated due to respiratory distress. Will place patient ON HOLD at this time. Please re-order therapy, as appropriate.

## 2018-02-02 NOTE — PROGRESS NOTES
[facility] Cardiology  Progress Note    Diana Parham Patient Status:  Inpatient    10/6/1960 MRN [id number]   [facility] 6NE-A Attending Rach Bill MD   Hosp Day # 7 PCP Pietro Byrd DO     Impression:  1.  Bicuspid AV w mcg Intravenous Q30 Min PRN   acetaminophen (TYLENOL) tab 650 mg 650 mg Oral Q6H PRN   Or      acetaminophen (TYLENOL) 160 MG/5ML oral liquid 650 mg 650 mg Oral Q6H PRN   Or      acetaminophen (TYLENOL) 650 MG rectal suppository 650 mg 650 mg Rectal Q6H [place] 1-25 Units Subcutaneous Once   bisacodyl (DULCOLAX) rectal suppository 10 mg 10 mg Rectal Daily PRN   FLEET ENEMA (FLEET) 7-19 GM/118ML enema 133 mL 1 enema Rectal Once PRN   glucose (DEX4) oral liquid 15 g 15 g Oral Q15 Min PRN   Or      Glucose-Vitamin C mEq Intravenous PRN   Or      potassium chloride IVPB premix 40 mEq 40 mEq Intravenous PRN   calcium gluconate 3 g in sodium chloride 0.9 % 100 mL IVPB 3 g Intravenous PRN   Magnesium Sulfate in D5W IVPB premix 1 g 1 g Intravenous PRN   Magnesium Sulfate I

## 2018-02-02 NOTE — RESPIRATORY THERAPY NOTE
Art line placed in left radial with 20g arrow needle. Threaded without resistance. Good blood return. Flushes well. Good waveform on monitor.

## 2018-02-03 ENCOUNTER — APPOINTMENT (OUTPATIENT)
Dept: GENERAL RADIOLOGY | Facility: HOSPITAL | Age: 58
DRG: 219 | End: 2018-02-03
Attending: INTERNAL MEDICINE
Payer: COMMERCIAL

## 2018-02-03 LAB
ARTERIAL BLD GAS O2 SATURATION: 94 % (ref 92–100)
ARTERIAL BLOOD GAS BASE EXCESS: 7.7
ARTERIAL BLOOD GAS HCO3: 31.5 MEQ/L (ref 22–26)
ARTERIAL BLOOD GAS PCO2: 41 MM HG (ref 35–45)
ARTERIAL BLOOD GAS PH: 7.5 (ref 7.35–7.45)
ARTERIAL BLOOD GAS PO2: 67 MM HG (ref 80–105)
ATRIAL RATE: 76 BPM
ATRIAL RATE: 76 BPM
BASOPHILS # BLD AUTO: 0.01 X10(3) UL (ref 0–0.1)
BASOPHILS NFR BLD AUTO: 0.1 %
BUN BLD-MCNC: 38 MG/DL (ref 8–20)
CALCIUM BLD-MCNC: 7.9 MG/DL (ref 8.3–10.3)
CALCULATED O2 SATURATION: 95 % (ref 92–100)
CARBOXYHEMOGLOBIN: 1.3 % SAT (ref 0–3)
CHLORIDE: 101 MMOL/L (ref 101–111)
CO2: 33 MMOL/L (ref 22–32)
CREAT BLD-MCNC: 1.06 MG/DL (ref 0.55–1.02)
EOSINOPHIL # BLD AUTO: 0.03 X10(3) UL (ref 0–0.3)
EOSINOPHIL NFR BLD AUTO: 0.2 %
ERYTHROCYTE [DISTWIDTH] IN BLOOD BY AUTOMATED COUNT: 14.8 % (ref 11.5–16)
FIO2: 45 %
GLUCOSE BLD-MCNC: 119 MG/DL (ref 65–99)
GLUCOSE BLD-MCNC: 121 MG/DL (ref 65–99)
GLUCOSE BLD-MCNC: 134 MG/DL (ref 70–99)
GLUCOSE BLD-MCNC: 136 MG/DL (ref 65–99)
GLUCOSE BLD-MCNC: 140 MG/DL (ref 65–99)
HAV IGM SER QL: 2.4 MG/DL (ref 1.7–3)
HCT VFR BLD AUTO: 25.5 % (ref 34–50)
HGB BLD-MCNC: 8.3 G/DL (ref 12–16)
IMMATURE GRANULOCYTE COUNT: 0.2 X10(3) UL (ref 0–1)
IMMATURE GRANULOCYTE RATIO %: 1.5 %
INR BLD: 1.28 (ref 0.89–1.11)
LYMPHOCYTES # BLD AUTO: 1.91 X10(3) UL (ref 0.9–4)
LYMPHOCYTES NFR BLD AUTO: 14.4 %
MCH RBC QN AUTO: 30.2 PG (ref 27–33.2)
MCHC RBC AUTO-ENTMCNC: 32.5 G/DL (ref 31–37)
MCV RBC AUTO: 92.7 FL (ref 81–100)
METHEMOGLOBIN: 0.4 % SAT (ref 0.4–1.5)
MONOCYTES # BLD AUTO: 1.39 X10(3) UL (ref 0.1–0.6)
MONOCYTES NFR BLD AUTO: 10.5 %
NEUTROPHIL ABS PRELIM: 9.73 X10 (3) UL (ref 1.3–6.7)
NEUTROPHILS # BLD AUTO: 9.73 X10(3) UL (ref 1.3–6.7)
NEUTROPHILS NFR BLD AUTO: 73.3 %
P AXIS: 64 DEGREES
P AXIS: 66 DEGREES
P-R INTERVAL: 128 MS
P-R INTERVAL: 128 MS
PATIENT TEMPERATURE: 98.9 F
PEEP: 10 CM H2O
PHOSPHATE SERPL-MCNC: 2.4 MG/DL (ref 2.5–4.9)
PLATELET # BLD AUTO: 255 10(3)UL (ref 150–450)
POTASSIUM SERPL-SCNC: 3.5 MMOL/L (ref 3.6–5.1)
POTASSIUM SERPL-SCNC: 4.2 MMOL/L (ref 3.6–5.1)
PSA SERPL DL<=0.01 NG/ML-MCNC: 16.1 SECONDS (ref 12–14.3)
Q-T INTERVAL: 524 MS
Q-T INTERVAL: 532 MS
QRS DURATION: 98 MS
QRS DURATION: 98 MS
QTC CALCULATION (BEZET): 589 MS
QTC CALCULATION (BEZET): 598 MS
R AXIS: 10 DEGREES
R AXIS: 5 DEGREES
RBC # BLD AUTO: 2.75 X10(6)UL (ref 3.8–5.1)
RED CELL DISTRIBUTION WIDTH-SD: 49.4 FL (ref 35.1–46.3)
SODIUM SERPL-SCNC: 142 MMOL/L (ref 136–144)
T AXIS: 83 DEGREES
T AXIS: 85 DEGREES
TIDAL VOLUME: 400 ML
TOTAL HEMOGLOBIN: 7 G/DL (ref 11.7–16)
VENT RATE: 20 /MIN
VENTRICULAR RATE: 76 BPM
VENTRICULAR RATE: 76 BPM
WBC # BLD AUTO: 13.3 X10(3) UL (ref 4–13)

## 2018-02-03 PROCEDURE — 84100 ASSAY OF PHOSPHORUS: CPT | Performed by: INTERNAL MEDICINE

## 2018-02-03 PROCEDURE — 80048 BASIC METABOLIC PNL TOTAL CA: CPT | Performed by: INTERNAL MEDICINE

## 2018-02-03 PROCEDURE — 85610 PROTHROMBIN TIME: CPT | Performed by: INTERNAL MEDICINE

## 2018-02-03 PROCEDURE — 85018 HEMOGLOBIN: CPT | Performed by: INTERNAL MEDICINE

## 2018-02-03 PROCEDURE — 82375 ASSAY CARBOXYHB QUANT: CPT | Performed by: INTERNAL MEDICINE

## 2018-02-03 PROCEDURE — 85025 COMPLETE CBC W/AUTO DIFF WBC: CPT | Performed by: INTERNAL MEDICINE

## 2018-02-03 PROCEDURE — 76937 US GUIDE VASCULAR ACCESS: CPT

## 2018-02-03 PROCEDURE — 84132 ASSAY OF SERUM POTASSIUM: CPT | Performed by: THORACIC SURGERY (CARDIOTHORACIC VASCULAR SURGERY)

## 2018-02-03 PROCEDURE — 83735 ASSAY OF MAGNESIUM: CPT | Performed by: INTERNAL MEDICINE

## 2018-02-03 PROCEDURE — 82803 BLOOD GASES ANY COMBINATION: CPT | Performed by: INTERNAL MEDICINE

## 2018-02-03 PROCEDURE — 71045 X-RAY EXAM CHEST 1 VIEW: CPT | Performed by: INTERNAL MEDICINE

## 2018-02-03 PROCEDURE — 3E0G76Z INTRODUCTION OF NUTRITIONAL SUBSTANCE INTO UPPER GI, VIA NATURAL OR ARTIFICIAL OPENING: ICD-10-PCS | Performed by: THORACIC SURGERY (CARDIOTHORACIC VASCULAR SURGERY)

## 2018-02-03 PROCEDURE — 36569 INSJ PICC 5 YR+ W/O IMAGING: CPT

## 2018-02-03 PROCEDURE — 93005 ELECTROCARDIOGRAM TRACING: CPT

## 2018-02-03 PROCEDURE — 02HV33Z INSERTION OF INFUSION DEVICE INTO SUPERIOR VENA CAVA, PERCUTANEOUS APPROACH: ICD-10-PCS | Performed by: THORACIC SURGERY (CARDIOTHORACIC VASCULAR SURGERY)

## 2018-02-03 PROCEDURE — 94640 AIRWAY INHALATION TREATMENT: CPT

## 2018-02-03 PROCEDURE — 93010 ELECTROCARDIOGRAM REPORT: CPT | Performed by: INTERNAL MEDICINE

## 2018-02-03 PROCEDURE — 83050 HGB METHEMOGLOBIN QUAN: CPT | Performed by: INTERNAL MEDICINE

## 2018-02-03 PROCEDURE — S0028 INJECTION, FAMOTIDINE, 20 MG: HCPCS | Performed by: THORACIC SURGERY (CARDIOTHORACIC VASCULAR SURGERY)

## 2018-02-03 PROCEDURE — 82962 GLUCOSE BLOOD TEST: CPT

## 2018-02-03 PROCEDURE — 0DH67UZ INSERTION OF FEEDING DEVICE INTO STOMACH, VIA NATURAL OR ARTIFICIAL OPENING: ICD-10-PCS | Performed by: THORACIC SURGERY (CARDIOTHORACIC VASCULAR SURGERY)

## 2018-02-03 PROCEDURE — 94003 VENT MGMT INPAT SUBQ DAY: CPT

## 2018-02-03 RX ORDER — SODIUM CHLORIDE 0.9 % (FLUSH) 0.9 %
10 SYRINGE (ML) INJECTION AS NEEDED
Status: DISCONTINUED | OUTPATIENT
Start: 2018-02-03 | End: 2018-02-08

## 2018-02-03 RX ORDER — HEPARIN SODIUM 5000 [USP'U]/ML
5000 INJECTION, SOLUTION INTRAVENOUS; SUBCUTANEOUS EVERY 12 HOURS SCHEDULED
Status: DISCONTINUED | OUTPATIENT
Start: 2018-02-03 | End: 2018-02-08

## 2018-02-03 RX ORDER — HEPARIN SODIUM 5000 [USP'U]/ML
5000 INJECTION, SOLUTION INTRAVENOUS; SUBCUTANEOUS EVERY 12 HOURS SCHEDULED
Status: DISCONTINUED | OUTPATIENT
Start: 2018-02-03 | End: 2018-02-03

## 2018-02-03 RX ORDER — MINERAL OIL AND PETROLATUM 150; 830 MG/G; MG/G
OINTMENT OPHTHALMIC 3 TIMES DAILY
Status: DISCONTINUED | OUTPATIENT
Start: 2018-02-03 | End: 2018-02-04

## 2018-02-03 RX ORDER — ARIPIPRAZOLE 15 MG/1
40 TABLET ORAL EVERY 4 HOURS
Status: COMPLETED | OUTPATIENT
Start: 2018-02-03 | End: 2018-02-03

## 2018-02-03 RX ORDER — AMIODARONE HYDROCHLORIDE 200 MG/1
400 TABLET ORAL 3 TIMES DAILY
Status: DISCONTINUED | OUTPATIENT
Start: 2018-02-03 | End: 2018-02-04

## 2018-02-03 RX ORDER — ASPIRIN 81 MG/1
81 TABLET, CHEWABLE ORAL DAILY
Status: DISCONTINUED | OUTPATIENT
Start: 2018-02-03 | End: 2018-02-08

## 2018-02-03 RX ORDER — ASPIRIN 81 MG/1
TABLET, CHEWABLE ORAL
Status: COMPLETED
Start: 2018-02-03 | End: 2018-02-03

## 2018-02-03 NOTE — PLAN OF CARE
Assumed care of pt at 0730. Sedation weaned, pt able to stand up and pivot to chair with assistance. Up in chair for several hours, tolerated very well. Able to communicate appropriately with gestures and writing. Denies any pain at this time.  Moderate s

## 2018-02-03 NOTE — PROGRESS NOTES
Critical Care Progress Note     Assessment / Plan:  1. Acute resp failure: due to ARDS. Likely has component of pulm edema as well. Cannot r/o PNA.  Intubated 2/2  - continue full vent support  - acceptable vent pressures  - wean FiO2 and PEEP as able  - mi

## 2018-02-03 NOTE — DIETARY NOTE
NUTRITION INITIAL ASSESSMENT    Pt is at moderate nutrition risk. Pt does not meet malnutrition criteria.     NUTRITION DIAGNOSIS/PROBLEM:    Inadequate oral intake related to intubation as evidenced by need for tubefeedings    NUTRITION INTERVENTION: kg)  Fluid: ~1 ml/kcal or per MD discretion    MONITOR AND EVALUATE/NUTRITION GOALS:    3. No signs of skin breakdown  4. Maintain lean body mass  5. Tolerance of enteral nutrition without signs/symptoms of intolerance (abdominal discomfort/distention)  6.

## 2018-02-03 NOTE — PROGRESS NOTES
[facility]  Progress Note    Ayad Nevarez Patient Status:  Inpatient    10/6/1960 MRN [id number]   [facility] 6NE-A Attending Cali Gonsalez MD   [address] Day # 8 PCP Alise Ann DO       SUBJECTIVE:  Intubated yesterday for ARDS, 400 mg 400 mg Per NG Tube TID   fentaNYL citrate (SUBLIMAZE) 0.05 MG/ML injection 25 mcg 25 mcg Intravenous Q30 Min PRN   Or      fentaNYL citrate (SUBLIMAZE) 0.05 MG/ML injection 50 mcg 50 mcg Intravenous Q30 Min PRN   acetaminophen (TYLENOL) tab 650 mg 6 Units Subcutaneous Once   bisacodyl (DULCOLAX) rectal suppository 10 mg 10 mg Rectal Daily PRN   FLEET ENEMA (FLEET) 7-19 GM/118ML enema 133 mL 1 enema Rectal Once PRN   glucose (DEX4) oral liquid 15 g 15 g Oral Q15 Min PRN   Or      Glucose-Vitamin C (DEX Intravenous PRN   Or      potassium chloride IVPB premix 40 mEq 40 mEq Intravenous PRN   calcium gluconate 3 g in sodium chloride 0.9 % 100 mL IVPB 3 g Intravenous PRN   Magnesium Sulfate in D5W IVPB premix 1 g 1 g Intravenous PRN   Magnesium Sulfate IVPB

## 2018-02-03 NOTE — PROGRESS NOTES
[facility] CVS Progress Note    Duane Giron Patient Status:  Inpatient    10/6/1960 MRN [id number]   [facility] 6NE-A Attending Anil Crabtree MD   [facility] Day # 8 PCP Mary Kay Staley DO     Subjective:    Sedated intubated anjelicao mg Rectal Q6H PRN   magnesium hydroxide (MILK OF MAGNESIA) 400 MG/5ML suspension 30 mL 30 mL Oral Daily PRN   FLEET ENEMA (FLEET) 7-19 GM/118ML enema 133 mL 1 enema Rectal Once PRN   Chlorhexidine Gluconate (PERIDEX) 0.12 % solution 15 mL 15 mL Mouth/Throa Oral Q15 Min PRN   Or      Glucose-Vitamin C (DEX-4) 4-0.006 g chewable tab 8 tablet 8 tablet Oral Q15 Min PRN   atorvastatin (LIPITOR) tab 80 mg 80 mg Oral Nightly   venlafaxine ER (EFFEXOR-XR) ER cap/tab 75 mg 75 mg Oral Daily   HYDROcodone-acetaminophen 33.0 02/03/2018    02/03/2018   CA 7.9 02/03/2018       Chest Xray:   COMPARISON:  [facility] , XR CHEST AP PORTABLE  (CPT=71045), 2/02/2018, 8:09.      INDICATIONS:  resp failure     PATIENT STATED HISTORY: (As transcribed by Technologist)  Patient offe

## 2018-02-03 NOTE — PROGRESS NOTES
[facility] Cardiology  Progress Note    Andressa Devine Patient Status:  Inpatient    10/6/1960 MRN [id number]   [facility] 6NE-A Attending Cornelius Vale MD   [address] Day # 8 PCP Angelina Ware DO     Impression:  1.  Bicuspid AV w (SUBLIMAZE) 0.05 MG/ML injection 25 mcg 25 mcg Intravenous Q30 Min PRN   Or      fentaNYL citrate (SUBLIMAZE) 0.05 MG/ML injection 50 mcg 50 mcg Intravenous Q30 Min PRN   acetaminophen (TYLENOL) tab 650 mg 650 mg Oral Q6H PRN   Or      acetaminophen (TYLEN Aspart Pen (NOVOLOG) 100 UNIT/ML flexpen 1-25 Units 1-25 Units Subcutaneous Once   bisacodyl (DULCOLAX) rectal suppository 10 mg 10 mg Rectal Daily PRN   FLEET ENEMA (FLEET) 7-19 GM/118ML enema 133 mL 1 enema Rectal Once PRN   glucose (DEX4) oral liquid 15 Intravenous BID   potassium chloride IVPB premix 20 mEq 20 mEq Intravenous PRN   Or      potassium chloride IVPB premix 40 mEq 40 mEq Intravenous PRN   calcium gluconate 3 g in sodium chloride 0.9 % 100 mL IVPB 3 g Intravenous PRN   Magnesium Sulfate in D5

## 2018-02-03 NOTE — PLAN OF CARE
Patient/Family Goals    • Patient/Family Long Term Goal Not Progressing    • Patient/Family Short Term Goal Not Progressing        RESPIRATORY - ADULT    • Achieves optimal ventilation and oxygenation Not Progressing          CARDIOVASCULAR - ADULT    • Ma

## 2018-02-03 NOTE — PLAN OF CARE
Assumed care of pt @ 1930. Rec'd pt vented/sedated with Propofol/Precedex. Pt. Opens eyes to voice, MAEx 4 to command. VSS, afebrile, tele=NSR rate 70's. Amiodarone drips noted. Fentanyl drip initiated. Segovia to gravity.   Family @ bs, updated with al

## 2018-02-04 ENCOUNTER — APPOINTMENT (OUTPATIENT)
Dept: GENERAL RADIOLOGY | Facility: HOSPITAL | Age: 58
DRG: 219 | End: 2018-02-04
Attending: INTERNAL MEDICINE
Payer: COMMERCIAL

## 2018-02-04 LAB
ARTERIAL BLD GAS O2 SATURATION: 95 % (ref 92–100)
ARTERIAL BLOOD GAS BASE EXCESS: 6.6
ARTERIAL BLOOD GAS HCO3: 32 MEQ/L (ref 22–26)
ARTERIAL BLOOD GAS PCO2: 51 MM HG (ref 35–45)
ARTERIAL BLOOD GAS PH: 7.42 (ref 7.35–7.45)
ARTERIAL BLOOD GAS PO2: 81 MM HG (ref 80–105)
BUN BLD-MCNC: 33 MG/DL (ref 8–20)
CALCIUM BLD-MCNC: 7.6 MG/DL (ref 8.3–10.3)
CALCULATED O2 SATURATION: 96 % (ref 92–100)
CARBOXYHEMOGLOBIN: 1.2 % SAT (ref 0–3)
CHLORIDE: 105 MMOL/L (ref 101–111)
CO2: 33 MMOL/L (ref 22–32)
CREAT BLD-MCNC: 0.98 MG/DL (ref 0.55–1.02)
FIO2: 40 %
GLUCOSE BLD-MCNC: 115 MG/DL (ref 70–99)
GLUCOSE BLD-MCNC: 119 MG/DL (ref 65–99)
GLUCOSE BLD-MCNC: 126 MG/DL (ref 65–99)
L/M: 12 L/MIN
METHEMOGLOBIN: 0.4 % SAT (ref 0.4–1.5)
PATIENT TEMPERATURE: 99 F
POTASSIUM SERPL-SCNC: 3.6 MMOL/L (ref 3.6–5.1)
SODIUM SERPL-SCNC: 145 MMOL/L (ref 136–144)
TOTAL HEMOGLOBIN: 9.4 G/DL (ref 11.7–16)

## 2018-02-04 PROCEDURE — S0028 INJECTION, FAMOTIDINE, 20 MG: HCPCS | Performed by: THORACIC SURGERY (CARDIOTHORACIC VASCULAR SURGERY)

## 2018-02-04 PROCEDURE — 83050 HGB METHEMOGLOBIN QUAN: CPT | Performed by: INTERNAL MEDICINE

## 2018-02-04 PROCEDURE — 82962 GLUCOSE BLOOD TEST: CPT

## 2018-02-04 PROCEDURE — 71045 X-RAY EXAM CHEST 1 VIEW: CPT | Performed by: INTERNAL MEDICINE

## 2018-02-04 PROCEDURE — 82803 BLOOD GASES ANY COMBINATION: CPT | Performed by: INTERNAL MEDICINE

## 2018-02-04 PROCEDURE — 94003 VENT MGMT INPAT SUBQ DAY: CPT

## 2018-02-04 PROCEDURE — 94640 AIRWAY INHALATION TREATMENT: CPT

## 2018-02-04 PROCEDURE — 82375 ASSAY CARBOXYHB QUANT: CPT | Performed by: INTERNAL MEDICINE

## 2018-02-04 PROCEDURE — 94150 VITAL CAPACITY TEST: CPT

## 2018-02-04 PROCEDURE — 80048 BASIC METABOLIC PNL TOTAL CA: CPT | Performed by: INTERNAL MEDICINE

## 2018-02-04 PROCEDURE — 85018 HEMOGLOBIN: CPT | Performed by: INTERNAL MEDICINE

## 2018-02-04 RX ORDER — MINERAL OIL AND PETROLATUM 150; 830 MG/G; MG/G
OINTMENT OPHTHALMIC AS NEEDED
Status: DISCONTINUED | OUTPATIENT
Start: 2018-02-04 | End: 2018-02-08

## 2018-02-04 RX ORDER — AMIODARONE HYDROCHLORIDE 200 MG/1
200 TABLET ORAL 2 TIMES DAILY WITH MEALS
Status: DISCONTINUED | OUTPATIENT
Start: 2018-02-04 | End: 2018-02-06

## 2018-02-04 NOTE — PROGRESS NOTES
Rach Ruiz Patient Status:  Inpatient    10/6/1960 MRN [id number]   [facility] 6NE-A Attending Marissa Phoenix MD   [facility] Day # 9 PCP Lennox Veloz DO     Critical Care Progress Note      Assessment/Plan:  1.  Acute resp failure: due Mode: VC+  FiO2 (%):  [40 %-45 %] 40 %  S RR:  [] 116  S VT:  [400 mL] 400 mL  PEEP/CPAP (cm H2O):  [5 cm H20-10 cm H20] 5 cm H20  MAP (cm H2O):  [9-14] 9    Intake/Output Summary (Last 24 hours) at 02/04/18 0858  Last data filed at 02/04/18 0600   G

## 2018-02-04 NOTE — PROGRESS NOTES
[facility]   [facility] Progress Note    Nisreen Dominguez Patient Status:  Inpatient    10/6/1960 MRN [id number]   [facility] 6NE-A Attending Everardo Trent MD   [facility] Day # 9 PCP Yaz Cano,      Subjective:    Resting in bed.  Intubat MG/ML injection 50 mcg 50 mcg Intravenous Q30 Min PRN   acetaminophen (TYLENOL) tab 650 mg 650 mg Oral Q6H PRN   Or      acetaminophen (TYLENOL) 160 MG/5ML oral liquid 650 mg 650 mg Oral Q6H PRN   Or      acetaminophen (TYLENOL) 650 MG rectal suppository 6 glucose (DEX4) oral liquid 15 g 15 g Oral Q15 Min PRN   Or      Glucose-Vitamin C (DEX-4) 4-0.006 g chewable tab 4 tablet 4 tablet Oral Q15 Min PRN   Or      dextrose 50% injection 50 mL 50 mL Intravenous Q15 Min PRN   Or      glucose (DEX4) oral liquid Intravenous PRN       Labs:    Lab Results  Component Value Date   CREATSERUM 0.98 02/04/2018   BUN 33 02/04/2018    02/04/2018   K 3.6 02/04/2018    02/04/2018   CO2 33.0 02/04/2018    02/04/2018   CA 7.6 02/04/2018         Physical Exa

## 2018-02-04 NOTE — PROGRESS NOTES
[facility] Cardiology  Progress Note    Sandra Godwin Patient Status:  Inpatient    10/6/1960 MRN [id number]   [facility] 6NE-A Attending Gwenette Curling, MD   [facility] Day # 9 PCP Lianna Bravo DO     Impression:  1.  Bicuspid AV w movement      Current Facility-Administered Medications:  amiodarone HCl (PACERONE) tab 400 mg 400 mg Per NG Tube TID   aspirin chewable tab 81 mg 81 mg Oral Daily   Heparin Sodium (Porcine) 5000 UNIT/ML injection 5,000 Units 5,000 Units Subcutaneous 2 rabia (PRECEDEX) 400 MCG/100ML premix infusion 0.2-1.5 mcg/kg/hr (Dosing Weight) Intravenous Continuous   ipratropium-albuterol (DUONEB) nebulizer solution 3 mL 3 mL Nebulization 6 times per day   melatonin tab TABS 1 mg 1 mg Oral Nightly   furosemide (LASIX) in 10 mg 10 mg Rectal Daily PRN   ondansetron HCl (ZOFRAN) injection 4 mg 4 mg Intravenous Q6H PRN   famoTIDine (PEPCID) tab 20 mg 20 mg Oral BID   Or      famoTIDine (PEPCID) injection 20 mg 20 mg Intravenous BID   potassium chloride IVPB premix 20 mEq 20 mE

## 2018-02-04 NOTE — PROGRESS NOTES
[facility]  Progress Note    Renny Sainz Patient Status:  Inpatient    10/6/1960 MRN [id number]   [facility] 6NE-A Attending Colette Hargrove MD   [facility] Day # 9 PCP Nemo Brooks DO       SUBJECTIVE:  Remains intubated for ARDS - m artificial tears 83-15 % ophthalmic ointment  Both Eyes TID   fentaNYL citrate (SUBLIMAZE) 0.05 MG/ML injection 25 mcg 25 mcg Intravenous Q30 Min PRN   Or      fentaNYL citrate (SUBLIMAZE) 0.05 MG/ML injection 50 mcg 50 mcg Intravenous Q30 Min PRN   acet UNIT/ML flexpen 1-25 Units 1-25 Units Subcutaneous Once   bisacodyl (DULCOLAX) rectal suppository 10 mg 10 mg Rectal Daily PRN   FLEET ENEMA (FLEET) 7-19 GM/118ML enema 133 mL 1 enema Rectal Once PRN   glucose (DEX4) oral liquid 15 g 15 g Oral Q15 Min PRN Intravenous PRN   calcium gluconate 3 g in sodium chloride 0.9 % 100 mL IVPB 3 g Intravenous PRN   Magnesium Sulfate in D5W IVPB premix 1 g 1 g Intravenous PRN   Magnesium Sulfate IVPB premix SOLN 2 g 2 g Intravenous PRN       Assessment and Plan:   57 yealyssa

## 2018-02-05 LAB
BASOPHILS # BLD AUTO: 0.04 X10(3) UL (ref 0–0.1)
BASOPHILS NFR BLD AUTO: 0.3 %
BUN BLD-MCNC: 23 MG/DL (ref 8–20)
CALCIUM BLD-MCNC: 8.4 MG/DL (ref 8.3–10.3)
CHLORIDE: 101 MMOL/L (ref 101–111)
CO2: 36 MMOL/L (ref 22–32)
CREAT BLD-MCNC: 0.82 MG/DL (ref 0.55–1.02)
EOSINOPHIL # BLD AUTO: 0.78 X10(3) UL (ref 0–0.3)
EOSINOPHIL NFR BLD AUTO: 5.8 %
ERYTHROCYTE [DISTWIDTH] IN BLOOD BY AUTOMATED COUNT: 14.9 % (ref 11.5–16)
GLUCOSE BLD-MCNC: 110 MG/DL (ref 65–99)
GLUCOSE BLD-MCNC: 123 MG/DL (ref 65–99)
GLUCOSE BLD-MCNC: 127 MG/DL (ref 70–99)
GLUCOSE BLD-MCNC: 138 MG/DL (ref 65–99)
HCT VFR BLD AUTO: 28.7 % (ref 34–50)
HGB BLD-MCNC: 8.8 G/DL (ref 12–16)
IMMATURE GRANULOCYTE COUNT: 0.17 X10(3) UL (ref 0–1)
IMMATURE GRANULOCYTE RATIO %: 1.3 %
LYMPHOCYTES # BLD AUTO: 1.6 X10(3) UL (ref 0.9–4)
LYMPHOCYTES NFR BLD AUTO: 11.8 %
MCH RBC QN AUTO: 29.8 PG (ref 27–33.2)
MCHC RBC AUTO-ENTMCNC: 30.7 G/DL (ref 31–37)
MCV RBC AUTO: 97.3 FL (ref 81–100)
MONOCYTES # BLD AUTO: 1.35 X10(3) UL (ref 0.1–0.6)
MONOCYTES NFR BLD AUTO: 10 %
NEUTROPHIL ABS PRELIM: 9.59 X10 (3) UL (ref 1.3–6.7)
NEUTROPHILS # BLD AUTO: 9.59 X10(3) UL (ref 1.3–6.7)
NEUTROPHILS NFR BLD AUTO: 70.8 %
PLATELET # BLD AUTO: 324 10(3)UL (ref 150–450)
POTASSIUM SERPL-SCNC: 3.6 MMOL/L (ref 3.6–5.1)
RBC # BLD AUTO: 2.95 X10(6)UL (ref 3.8–5.1)
RED CELL DISTRIBUTION WIDTH-SD: 51.8 FL (ref 35.1–46.3)
SODIUM SERPL-SCNC: 143 MMOL/L (ref 136–144)
WBC # BLD AUTO: 13.5 X10(3) UL (ref 4–13)

## 2018-02-05 PROCEDURE — 94640 AIRWAY INHALATION TREATMENT: CPT

## 2018-02-05 PROCEDURE — 80048 BASIC METABOLIC PNL TOTAL CA: CPT | Performed by: INTERNAL MEDICINE

## 2018-02-05 PROCEDURE — 92610 EVALUATE SWALLOWING FUNCTION: CPT

## 2018-02-05 PROCEDURE — 85025 COMPLETE CBC W/AUTO DIFF WBC: CPT | Performed by: INTERNAL MEDICINE

## 2018-02-05 PROCEDURE — 97535 SELF CARE MNGMENT TRAINING: CPT

## 2018-02-05 PROCEDURE — 97164 PT RE-EVAL EST PLAN CARE: CPT

## 2018-02-05 PROCEDURE — 82962 GLUCOSE BLOOD TEST: CPT

## 2018-02-05 PROCEDURE — 97168 OT RE-EVAL EST PLAN CARE: CPT

## 2018-02-05 PROCEDURE — 97530 THERAPEUTIC ACTIVITIES: CPT

## 2018-02-05 RX ORDER — DILTIAZEM HYDROCHLORIDE 5 MG/ML
INJECTION INTRAVENOUS
Status: COMPLETED
Start: 2018-02-05 | End: 2018-02-05

## 2018-02-05 RX ORDER — FUROSEMIDE 10 MG/ML
20 INJECTION INTRAMUSCULAR; INTRAVENOUS
Status: DISCONTINUED | OUTPATIENT
Start: 2018-02-05 | End: 2018-02-08

## 2018-02-05 RX ORDER — DILTIAZEM HYDROCHLORIDE 5 MG/ML
10 INJECTION INTRAVENOUS
Status: DISCONTINUED | OUTPATIENT
Start: 2018-02-05 | End: 2018-02-08

## 2018-02-05 RX ORDER — METOPROLOL SUCCINATE 25 MG/1
25 TABLET, EXTENDED RELEASE ORAL
Status: DISCONTINUED | OUTPATIENT
Start: 2018-02-05 | End: 2018-02-08

## 2018-02-05 RX ORDER — DIGOXIN 0.25 MG/ML
500 INJECTION INTRAMUSCULAR; INTRAVENOUS ONCE
Status: COMPLETED | OUTPATIENT
Start: 2018-02-05 | End: 2018-02-05

## 2018-02-05 NOTE — PHYSICAL THERAPY NOTE
PHYSICAL THERAPY EVALUATION - INPATIENT     Room Number: 9321/6508-B  Evaluation Date: 2/5/2018  Type of Evaluation: Re-evaluation  Physician Order: PT Eval and Treat    Presenting Problem: AVR 1/26 s/p intubation 2/2  Reason for Therapy: Mobility Dysf device,  works full time but reports he can stay home if needed  :     SUBJECTIVE  \"I want to walk\"    Patient self-stated goal is get better    OBJECTIVE  Precautions: Sternal;Cardiac  Fall Risk: High fall risk    WEIGHT BEARING RESTRICTION  Leigha Scale): 35.33   CMS Modifier (G-Code): CL    FUNCTIONAL ABILITY STATUS  Gait Assessment   Gait Assistance: Dependent assistance (FIM due to distance)  Distance (ft): 20  Assistive Device: Rolling walker  Pattern: Shuffle; Ataxic  Stoop/Curb Assistance: Not function. DISCHARGE RECOMMENDATIONS  PT Discharge Recommendations: Acute rehabilitation    PLAN  PT Treatment Plan: Bed mobility; Endurance; Energy conservation;Patient education; Family education;Gait training;Neuromuscular re-educate;Stair training;Transfe

## 2018-02-05 NOTE — PROGRESS NOTES
Pulmonary Progress Note      NAME: Duane Giron - ROOM: 8469/0100-Q - MRN: [id number] - Age: 59year old - : 10/6/1960    Assessment/Plan:  1. Acute resp failure: due to ARDS. Component of pulm edema as well. Cannot r/o PNA.    - improved and succes SpO2 97%   BMI 33.64 kg/m²   Physical Exam:   General: alert, cooperative, no respiratory distress. HEENT: Normocephalic atraumatic. Lips, mucosa, and tongue normal.  No thrush noted.    Neck: supple without mass   Lungs: ronchi mildly - but overall wili

## 2018-02-05 NOTE — SLP NOTE
ADULT SWALLOWING EVALUATION    ASSESSMENT    ASSESSMENT/OVERALL IMPRESSION:  Patient seen for swallowing evaluation as she was noted to cough drinking water during RN dysphagia screen post extubation yesterday.   Patient  at bedside and contributes t List  Active Problems:    * No active hospital problems.  *      Past Medical History  Past Medical History:   Diagnosis Date   • Carotid bruit present    • Depression    • Headache    • Heart murmur    • High blood pressure    • High cholesterol    • HYPER symptoms of aspiration with 100 % accuracy over 1-2 session(s). In Progress   Goal #2 The patient/family/caregiver will demonstrate understanding and implementation of aspiration precautions and swallow strategies independently over 1-2 session(s).     In

## 2018-02-05 NOTE — OCCUPATIONAL THERAPY NOTE
OCCUPATIONAL THERAPY EVALUATION - INPATIENT     Room Number: 2285/9096-Z  Evaluation Date: 1/29/2018  Type of Evaluation: Initial  Presenting Problem: AVR    Physician Order: IP Consult to Occupational Therapy  Reason for Therapy: ADL/IADL Dysfunction and SUBJECTIVE   Pt stated, \"I am tried. \"    Patient self-stated goal is to go home.       OBJECTIVE  Precautions: Sternal;Cardiac  Fall Risk: High fall risk    WEIGHT BEARING RESTRICTION  Weight Bearing Restriction: None                PAIN ASSESSMENT  R NEUROLOGICAL FINDINGS  Neurological Findings: None  Coordination - Finger to Nose: Symmetrical             ACTIVITY TOLERANCE   Liters of O2:  4L    ACTIVITIES OF DAILY LIVING ASSESSMENT  AM-PAC ‘6-Clicks’ Inpatient Daily Activity Short Form  How much maintaining precautions, safety, and use of adaptive techniques interfere with pt's ability to safely complete UE/LE dressing, toileting, bathing, func'l mobility, medication management, cooking, IADLs, and community re-entry.   Pt able to recall 2 sternal assist.   Pt will stand at the sink x 2 minutes during [address] with 1 rest break CGA.

## 2018-02-05 NOTE — PROGRESS NOTES
Seen and examined by Dr. Brad Garcia, POD #10 s/p AVR  Much improved mentation, recent ambulation, d/c IVF, mobilize, keep in CCU today.   Ashlee Mendoza RN  Clinical Coordinator  CV Surgery

## 2018-02-05 NOTE — CM/SW NOTE
MSW met with the patient and her spouse at bedside to discuss the recommendations for acute rehab. The patient said, \"I'm not going anywhere. I'm going home. \"  Her spouse was in agreement with the patient.   He feels she is improving and does not wish t

## 2018-02-05 NOTE — PLAN OF CARE
Assumed care of pt @ 1930. Rec'd pt in bed resting. Pt. Reports pain to sternotomy site, [place] given per [facility]. Tele=intermittantly A paced, rate 50's, otherwise NSR 80s. NTG drip titrated per MD orders. TF per NGT, tolerating well. Segovia to gravity.

## 2018-02-05 NOTE — PROGRESS NOTES
[facility] Cardiology  Progress Note    Issajose Hollis Patient Status:  Inpatient    10/6/1960 MRN [id number]   [facility] 6NE-A Attending Giorgi Nance MD   Hosp Day # 10 PCP Tom Odonnell DO     Impression:  1.  Bicuspid AV artificial tears 83-15 % ophthalmic ointment  Both Eyes PRN   aspirin chewable tab 81 mg 81 mg Oral Daily   Heparin Sodium (Porcine) 5000 UNIT/ML injection 5,000 Units 5,000 Units Subcutaneous 2 times per day   Normal Saline Flush 0.9 % injection 10 mL 1 15 g Oral Q15 Min PRN   Or      Glucose-Vitamin C (DEX-4) 4-0.006 g chewable tab 4 tablet 4 tablet Oral Q15 Min PRN   Or      dextrose 50% injection 50 mL 50 mL Intravenous Q15 Min PRN   Or      glucose (DEX4) oral liquid 30 g 30 g Oral Q15 Min PRN   Or Data:    Lab Results  Component Value Date   WBC 13.5 02/05/2018   HGB 8.8 02/05/2018   HCT 28.7 02/05/2018   .0 02/05/2018       Lab Results  Component Value Date   INR 1.28 (H) 02/03/2018   INR 1.61 (H) 01/26/2018   INR 0.94 01/19/2018       Lab R

## 2018-02-05 NOTE — PLAN OF CARE
Assumed pt care at 0730. Pt intubated and sedated on fentanyl and precedex. Attempting weaning trial this morning, propofol and fentanyl turned off. Weaning precedex down. Pt placed on t-piece trial, tolerating well. Segovia in place draining onur urine.  NG

## 2018-02-06 LAB
BUN BLD-MCNC: 20 MG/DL (ref 8–20)
CALCIUM BLD-MCNC: 8.9 MG/DL (ref 8.3–10.3)
CHLORIDE: 97 MMOL/L (ref 101–111)
CO2: 36 MMOL/L (ref 22–32)
CREAT BLD-MCNC: 0.69 MG/DL (ref 0.55–1.02)
GLUCOSE BLD-MCNC: 103 MG/DL (ref 70–99)
POTASSIUM SERPL-SCNC: 3.7 MMOL/L (ref 3.6–5.1)
SODIUM SERPL-SCNC: 140 MMOL/L (ref 136–144)

## 2018-02-06 PROCEDURE — 94640 AIRWAY INHALATION TREATMENT: CPT

## 2018-02-06 PROCEDURE — 92526 ORAL FUNCTION THERAPY: CPT

## 2018-02-06 PROCEDURE — 80048 BASIC METABOLIC PNL TOTAL CA: CPT | Performed by: INTERNAL MEDICINE

## 2018-02-06 RX ORDER — AMIODARONE HYDROCHLORIDE 200 MG/1
200 TABLET ORAL DAILY
Status: DISCONTINUED | OUTPATIENT
Start: 2018-02-07 | End: 2018-02-08

## 2018-02-06 RX ORDER — IPRATROPIUM BROMIDE AND ALBUTEROL SULFATE 2.5; .5 MG/3ML; MG/3ML
3 SOLUTION RESPIRATORY (INHALATION)
Status: DISCONTINUED | OUTPATIENT
Start: 2018-02-06 | End: 2018-02-07

## 2018-02-06 RX ORDER — POTASSIUM CHLORIDE 20 MEQ/1
40 TABLET, EXTENDED RELEASE ORAL ONCE
Status: COMPLETED | OUTPATIENT
Start: 2018-02-06 | End: 2018-02-06

## 2018-02-06 NOTE — PROGRESS NOTES
[facility] Note: Route Optimization for Famotidine (PEPCID)    Patient is currently on Famotidine (PEPCID) 20 mg IV or PO every 12 hours.    The patient meets the criteria to convert to the oral equivalent as established by the IV to Oral conversion aline

## 2018-02-06 NOTE — PROGRESS NOTES
[facility]  CV Surgery Progress Note    Ward Durham Patient Status:  Inpatient    10/6/1960 MRN [id number]   [facility] 6NE-A Attending Piotr Gutierres MD   [facility] Day # 6 PCP Margaux Prasad DO     Subjective:  Looks well.  Denies c

## 2018-02-06 NOTE — PAYOR COMM NOTE
--------------  CONTINUED STAY REVIEW    Payor: [employer] [facility]  Subscriber #:  [id number]  Authorization Number: [id number]    Admit date: 1/26/18  Admit time: 3727    Admitting Physician: Haroon Whyte MD  Attending Physician:  Haroon Whyte MD  Primary Care Group    Critical Care Progress Note  Assessment/Plan:  1. Acute resp failure: due to ARDS. Component of pulm edema as well. Cannot r/o PNA.   - On minimal pressures, oxygen.  Plan for T-piece trial  - minimize sedation as able  - agree with continued diure RN      aspirin chewable tab 81 mg     Date Action Dose Route User    2/6/2018 0942 Given 81 mg Oral Margarito MARTI RN      atorvastatin (LIPITOR) tab 80 mg     Date Action Dose Route User    2/5/2018 2015 Given 80 mg Oral Carol Ring RN      docusa XL) 24 hr tab 25 mg     Date Action Dose Route User    2/6/2018 0529 Given 25 mg Oral Ngozi Chávez RN    2/5/2018 7848 Given 25 mg Oral Rafael Loza RN      Piperacillin Sod-Tazobactam So (ZOSYN) 3.375 g in dextrose 5 % 100 mL ADD-vantage

## 2018-02-06 NOTE — PLAN OF CARE
NURSING ADMISSION NOTE      Patient admitted via Wheelchair  Oriented to room. Safety precautions initiated. Bed in low position. Call light in reach.     Transferred from CNICU this am  Pt awake, alert and oriented  C/o incisional pain, Norco given

## 2018-02-06 NOTE — SLP NOTE
SPEECH DAILY NOTE - INPATIENT    ASSESSMENT & PLAN   ASSESSMENT  Pt seen for dysphagia tx to assess tolerance with recommended diet, ensure proper utilization of aspiration precautions and provide pt/family education.   Patient alert and up in bedside chair

## 2018-02-06 NOTE — PLAN OF CARE
Assumed care of pt @ 1930. Rec'd pt in chair, resting. [place] for sternotomy pain given. Tele=NSR, rate 70's, normotensive. Pt. Stable, in no acute distress. Report given to David Phillip. Pt. To be transferred to [address].   Pt. stable

## 2018-02-06 NOTE — CARDIAC REHAB
Initiated instruction post AVR surgery. Patient alert, and receptive.  present for education. CV Binder in room.

## 2018-02-06 NOTE — PROGRESS NOTES
Met with patient and  to discuss discharge planning, post op expectations. Current PT/OT d/c recs for AR, pt and  initially against referral, discussed at length the rehab recommendations, pts current limitations and o2 needs.   She lives in

## 2018-02-06 NOTE — PLAN OF CARE
Assumed pt care at 0730. Pt A&Ox 4, up in chair. NSR on monitor, 4L o2 via NC, VSS. Speech able to see pt this morning, cleared for regular diet. TF were stopped and NG tube was discontinued.  Clear liquid diet order, pt nayan diet well, advanced to cardiac d

## 2018-02-06 NOTE — PROGRESS NOTES
[facility]  Progress Note    Cesar Ring Patient Status:  Inpatient    10/6/1960 MRN [id number]   [facility] 6NE-A Attending Gretta Godwin MD   [facility] Day # 11 PCP Yisel Fuller DO       SUBJECTIVE:  No complaints this am.  Range 81 mg Oral Daily   Heparin Sodium (Porcine) 5000 UNIT/ML injection 5,000 Units 5,000 Units Subcutaneous 2 times per day   Normal Saline Flush 0.9 % injection 10 mL 10 mL Intravenous PRN   fentaNYL citrate (SUBLIMAZE) 0.05 MG/ML injection 25 mcg 25 mcg Intr Nightly   venlafaxine ER [facility] ER cap/tab 75 mg 75 mg Oral Daily   HYDROcodone-acetaminophen (NORCO)  MG per tab 1 tablet 1 tablet Oral Q4H PRN   Or      HYDROcodone-acetaminophen (NORCO)  MG per tab 2 tablet 2 tablet Oral Q4H PRN   morp s/p AVR 1/26/18  -per CV team    3. Acute hypoxic respiratory failure  - reintubated 2/2/18  - ventilatory support    4. Afib with RVR  - on amio   - underlying thyroid nodules  - 2/3/18: TSH normal   - will need future TSH monitoring on amio     5.   Multi

## 2018-02-06 NOTE — PROGRESS NOTES
Pulmonary Progress Note      NAME: Rach Ruiz - ROOM: 6966/9193-J - MRN: [id number] - Age: 59year old - : 10/6/1960    Assessment/Plan:  1. Acute resp failure: due to ARDS. Component of pulm edema as well. Cannot r/o PNA.    - improved and succes and tongue normal.  No thrush noted. Neck: supple without mass   Lungs: clearer   Chest wall: No tenderness or deformity. Heart: irregular   Abdomen: soft, non-tender, non-distended, positive BS.    Extremity: no edema   Skin: no new rash   Neuro: hector

## 2018-02-07 LAB
BUN BLD-MCNC: 18 MG/DL (ref 8–20)
CALCIUM BLD-MCNC: 7.2 MG/DL (ref 8.3–10.3)
CHLORIDE: 109 MMOL/L (ref 101–111)
CO2: 26 MMOL/L (ref 22–32)
CREAT BLD-MCNC: 0.48 MG/DL (ref 0.55–1.02)
GLUCOSE BLD-MCNC: 84 MG/DL (ref 70–99)
HAV IGM SER QL: 1.7 MG/DL (ref 1.7–3)
POTASSIUM SERPL-SCNC: 2.9 MMOL/L (ref 3.6–5.1)
POTASSIUM SERPL-SCNC: 4.4 MMOL/L (ref 3.6–5.1)
SODIUM SERPL-SCNC: 143 MMOL/L (ref 136–144)

## 2018-02-07 PROCEDURE — 97530 THERAPEUTIC ACTIVITIES: CPT

## 2018-02-07 PROCEDURE — 97110 THERAPEUTIC EXERCISES: CPT

## 2018-02-07 PROCEDURE — 84132 ASSAY OF SERUM POTASSIUM: CPT | Performed by: INTERNAL MEDICINE

## 2018-02-07 PROCEDURE — 93010 ELECTROCARDIOGRAM REPORT: CPT | Performed by: INTERNAL MEDICINE

## 2018-02-07 PROCEDURE — 97535 SELF CARE MNGMENT TRAINING: CPT

## 2018-02-07 PROCEDURE — 80048 BASIC METABOLIC PNL TOTAL CA: CPT | Performed by: INTERNAL MEDICINE

## 2018-02-07 PROCEDURE — 83735 ASSAY OF MAGNESIUM: CPT | Performed by: INTERNAL MEDICINE

## 2018-02-07 PROCEDURE — 93005 ELECTROCARDIOGRAM TRACING: CPT

## 2018-02-07 PROCEDURE — 94640 AIRWAY INHALATION TREATMENT: CPT

## 2018-02-07 PROCEDURE — 36592 COLLECT BLOOD FROM PICC: CPT

## 2018-02-07 RX ORDER — MAGNESIUM OXIDE 400 MG (241.3 MG MAGNESIUM) TABLET
400 TABLET ONCE
Status: COMPLETED | OUTPATIENT
Start: 2018-02-07 | End: 2018-02-07

## 2018-02-07 RX ORDER — POTASSIUM CHLORIDE 20 MEQ/1
40 TABLET, EXTENDED RELEASE ORAL EVERY 4 HOURS
Status: COMPLETED | OUTPATIENT
Start: 2018-02-07 | End: 2018-02-07

## 2018-02-07 NOTE — PROGRESS NOTES
[facility]  Progress Note    Renata Villafana Patient Status:  Inpatient    10/6/1960 MRN [id number]   [facility] 8NE-A Attending [place], MD   [address] Day # 6 PCP Bong Lezama DO     Impression:  1.  Bicuspid AV (CARDIZEM) injection 10 mg 10 mg Intravenous Q1H PRN   Metoprolol Succinate ER (Toprol XL) 24 hr tab 25 mg 25 mg Oral 2x Daily(Beta Blocker)   furosemide (LASIX) injection 20 mg 20 mg Intravenous BID (Diuretic)   amiodarone HCl (PACERONE) tab 200 mg 200 mg  MG per tab 2 tablet 2 tablet Oral Q4H PRN   morphINE sulfate (PF) 2 MG/ML injection 2 mg 2 mg Intravenous Q1H PRN   Or      morphINE sulfate (PF) 2 MG/ML injection 4 mg 4 mg Intravenous Q1H PRN   Or      morphINE sulfate (PF) 2 MG/ML injection 8 mg

## 2018-02-07 NOTE — PROGRESS NOTES
Pulmonary Progress Note      NAME: Delma Alejo - ROOM: 5264/5185-N - MRN: [id number] - Age: 59year old - : 10/6/1960    Assessment/Plan:  1. Acute resp failure: due to ARDS. Component of pulm edema as well. Cannot r/o PNA.  Resolving  - hopefully noted.   Neck: supple without mass   Lungs: clear   Chest wall: No tenderness or deformity. Heart: Regular rate and rhythm, normal S1S2, no murmur. Abdomen: soft, non-tender, non-distended, positive BS.    Extremity: no edema   Skin: no new rash   Neuro

## 2018-02-07 NOTE — CM/SW NOTE
EUGENE follow up on d/c plans. TIFFANIE re-eval today, also asked [facility] liaison to see patient. SW/CM will follow.

## 2018-02-07 NOTE — HOME CARE LIAISON
Received referral from SW/WILFRID. Will see pt. Thank you. Addendum: met with pt at the bedside. Agreeable to [facility] services at this time. Brochure given and all questions answered. Will follow.

## 2018-02-07 NOTE — PROGRESS NOTES
SUBJECTIVE: Successfully weaned off O2. No CP/SOB. CC: ADL mob s/p  AVR 1/26 due to stenosis with pulmonary edema and respiratory failure post op   Interval History: 2/2 required intubation due to ARDS and pulmonary edema. Extubated 2/5 and now on RA.  O 02/06/18 2037 132/85 98.4 °F (36.9 °C) Oral 92 20 91 % -   02/06/18 1631 114/69 98.3 °F (36.8 °C) Oral 83 18 92 % -         Intake/Output:    Intake/Output Summary (Last 24 hours) at 02/07/18 1453  Last data filed at 02/07/18 1300   Gross per 24 hour   I

## 2018-02-07 NOTE — CM/SW NOTE
Spoke with carolina Castillo from [address] [place] [address] will be re evaluated by dr Yane Ruiz today

## 2018-02-07 NOTE — PROGRESS NOTES
[facility]  CV Surgery Progress Note    Renny Sainz Patient Status:  Inpatient    10/6/1960 MRN [id number]   [facility] 6NE-A Attending Colette Hargrove MD   [facility] Day # 12 PCP Nemo Brooks,      Subjective:  Looks well.  Denies c

## 2018-02-07 NOTE — PAYOR COMM NOTE
--------------  CONTINUED STAY REVIEW    Payor: [facility]  Subscriber #:  [id number]  Authorization Number: [id number]    Admit date: 1/26/18  Admit time: 5475    Admitting Physician: Piotr Gutierres MD  Attending Physician:  Piotr Gutierres MD  Primary Care chewable tab 81 mg     Date Action Dose Route User    2/7/2018 0824 Given 81 mg Oral Kilo MARTI RN      atorvastatin (LIPITOR) tab 80 mg     Date Action Dose Route User    2/6/2018 2007 Given 80 mg Oral Chan Singh RN      famoTIDine (PEPC Jameson Booker RN      Normal Saline Flush 0.9 % injection 10 mL     Date Action Dose Route User    2/7/2018 1429 Given 10 mL Intravenous Kellie Rebolledo RN    2/7/2018 5385 Given 10 mL Intravenous Magdiel Llanos RN      Piperacillin Sod-Tazobactam So

## 2018-02-07 NOTE — OCCUPATIONAL THERAPY NOTE
OCCUPATIONAL THERAPY TREATMENT NOTE     Room Number: 4182/7050-T  Evaluation Date: 1/29/2018  Presenting Problem: AVR    Physician Order: IP Consult to Occupational Therapy  Reason for Therapy: ADL/IADL Dysfunction and Discharge Planning    History related stairs\"    Patient self-stated goal is to go home.       OBJECTIVE  Precautions: Sternal;Cardiac  Fall Risk: High fall risk    WEIGHT BEARING RESTRICTION  Weight Bearing Restriction: None    PAIN ASSESSMENT  Ratin  Location: incisional   Form  How much help from another person does the patient currently need…  -   Putting on and taking off regular lower body clothing?: A Little  -   Bathing (including washing, rinsing, drying)?: A Little  -   Toileting, which includes using toilet, bedpan participated in treatment session. Pt has progressed to S level for all mobility and ADL's. Pt DC from IP OT services at this time.   DC Recommendation change to home with Part time family Supervision    OT Discharge Recommendations: Home  OT Device Recomm

## 2018-02-07 NOTE — PROGRESS NOTES
Spo2% on room air at rest 91%    SpO2% ambulation on room air 87%    Spo2% ambulation on O2   93%    On 2 Liters per minute

## 2018-02-07 NOTE — PHYSICAL THERAPY NOTE
PHYSICAL THERAPY TREATMENT NOTE - INPATIENT    Room Number: 0994/1794-R     Session: 1 since re-eval  Number of Visits to Meet Established Goals: 5    Presenting Problem: AVR 1/26 s/p intubation 2/2     History related to current admission: admitted for A Static Sitting: Good  Dynamic Sitting: Fair +           Static Standing: Fair +  Dynamic Standing: Fair +    ACTIVITY TOLERANCE  O2 Saturation: 94%  O2 Device: Nasal cannula Returns to room and seated in chair. Left with OT.     THERAPEUTIC EXERCISES  Lower Extremity Alternating marching  Ankle pumps  Heel raises  LAQ     Upper Extremity Elbow flex/ext,  - open/close, Shoulder flex/ext and neck rotation, shoulder shrug 2/7/18

## 2018-02-08 VITALS
TEMPERATURE: 98 F | RESPIRATION RATE: 20 BRPM | OXYGEN SATURATION: 93 % | DIASTOLIC BLOOD PRESSURE: 69 MMHG | HEART RATE: 76 BPM | BODY MASS INDEX: 33.46 KG/M2 | SYSTOLIC BLOOD PRESSURE: 119 MMHG | HEIGHT: 64 IN | WEIGHT: 196 LBS

## 2018-02-08 LAB
BUN BLD-MCNC: 24 MG/DL (ref 8–20)
CALCIUM BLD-MCNC: 9 MG/DL (ref 8.3–10.3)
CHLORIDE: 100 MMOL/L (ref 101–111)
CO2: 32 MMOL/L (ref 22–32)
CREAT BLD-MCNC: 0.85 MG/DL (ref 0.55–1.02)
GLUCOSE BLD-MCNC: 119 MG/DL (ref 70–99)
HAV IGM SER QL: 2.3 MG/DL (ref 1.7–3)
POTASSIUM SERPL-SCNC: 4.2 MMOL/L (ref 3.6–5.1)
PROCALCITONIN SERPL-MCNC: <0.11 NG/ML (ref ?–0.11)
SODIUM SERPL-SCNC: 138 MMOL/L (ref 136–144)

## 2018-02-08 PROCEDURE — 84145 PROCALCITONIN (PCT): CPT | Performed by: INTERNAL MEDICINE

## 2018-02-08 PROCEDURE — 97530 THERAPEUTIC ACTIVITIES: CPT

## 2018-02-08 PROCEDURE — 83735 ASSAY OF MAGNESIUM: CPT | Performed by: INTERNAL MEDICINE

## 2018-02-08 PROCEDURE — 80048 BASIC METABOLIC PNL TOTAL CA: CPT | Performed by: INTERNAL MEDICINE

## 2018-02-08 RX ORDER — METOPROLOL SUCCINATE 25 MG/1
25 TABLET, EXTENDED RELEASE ORAL
Qty: 60 TABLET | Refills: 3 | Status: SHIPPED | OUTPATIENT
Start: 2018-02-08 | End: 2018-05-10

## 2018-02-08 RX ORDER — HYDROCODONE BITARTRATE AND ACETAMINOPHEN 10; 325 MG/1; MG/1
TABLET ORAL
Qty: 60 TABLET | Refills: 0 | Status: SHIPPED | OUTPATIENT
Start: 2018-02-08 | End: 2018-02-13

## 2018-02-08 RX ORDER — ASPIRIN 81 MG/1
81 TABLET, CHEWABLE ORAL DAILY
Qty: 90 TABLET | Refills: 2 | Status: ON HOLD | OUTPATIENT
Start: 2018-02-09 | End: 2019-03-14

## 2018-02-08 RX ORDER — AMIODARONE HYDROCHLORIDE 200 MG/1
200 TABLET ORAL DAILY
Qty: 90 TABLET | Refills: 1 | Status: SHIPPED | OUTPATIENT
Start: 2018-02-09 | End: 2018-05-30

## 2018-02-08 RX ORDER — TORSEMIDE 10 MG/1
10 TABLET ORAL DAILY
Qty: 90 TABLET | Refills: 1 | Status: SHIPPED | OUTPATIENT
Start: 2018-02-08 | End: 2018-08-05

## 2018-02-08 RX ORDER — POTASSIUM CHLORIDE 750 MG/1
10 TABLET, EXTENDED RELEASE ORAL DAILY
Qty: 90 TABLET | Refills: 1 | Status: SHIPPED | OUTPATIENT
Start: 2018-02-08 | End: 2018-08-05

## 2018-02-08 NOTE — PROGRESS NOTES
[facility] Cardiology  Progress Note    Simeon Park Patient Status:  Inpatient    10/6/1960 MRN [id number]   [facility] 8NE-A Attending Sathish Hyde MD   [facility] Day # 15 PCP Abner Albert DO     Impression:  1.  Bicuspid AV clubbing/cyanosis; moves all 4 extremities normally      Current Facility-Administered Medications:  amiodarone HCl (PACERONE) tab 200 mg 200 mg Oral Daily   DilTIAZem HCl (CARDIZEM) injection 10 mg 10 mg Intravenous Q1H PRN   Metoprolol Succinate ER (Topr (NORCO)  MG per tab 1 tablet 1 tablet Oral Q4H PRN   Or      HYDROcodone-acetaminophen (NORCO)  MG per tab 2 tablet 2 tablet Oral Q4H PRN   morphINE sulfate (PF) 2 MG/ML injection 2 mg 2 mg Intravenous Q1H PRN   Or      morphINE sulfate (PF) 2

## 2018-02-08 NOTE — PLAN OF CARE
POD#13 S/P AVR. Alert. Oriented. sr per tele. Weaned to room air while sleeping tonight. desats to 85%. o2 resumed at 2l/HF and o2 sat up to 95%. Pt preferred to sleep on recliner. picc line- flushed/patent.  On empiric iv zosyn for pna. procalcitonin level

## 2018-02-08 NOTE — H&P
Ms. Tamie Walsh is a very pleasant 49-year-old female who has had known aortic valve murmur for a number of years. Most recent echo data on the valve shows that she clearly has severe aortic stenosis.   She has a 67 mm peak gradient on the aortic valve with 38 Physical examination shows a female who is 5'4\" and 212 pounds, with body surface area of 2.13 meters ². Pupils are equal and round. Carotid pulses are equal bilaterally. There is a conducted murmur over the carotids.   The lungs are clear without rale

## 2018-02-08 NOTE — PROGRESS NOTES
[facility] Cardiology  Progress Note    Shae Krer Patient Status:  Inpatient    10/6/1960 MRN [id number]   [facility] 8NE-A Attending Trisha Denise MD   Hosp Day # 12 PCP Tawanna Jenkins DO     Impression:  1.  Bicuspid AV 25 mg Oral 2x Daily(Beta Blocker)   furosemide (LASIX) injection 20 mg 20 mg Intravenous BID (Diuretic)   artificial tears 83-15 % ophthalmic ointment  Both Eyes PRN   aspirin chewable tab 81 mg 81 mg Oral Daily   Heparin Sodium (Porcine) 5000 UNIT/ML inje Intravenous Q1H PRN   Or      morphINE sulfate (PF) 2 MG/ML injection 8 mg 8 mg Intravenous Q1H PRN   DOBUTamine in D5W (DOBUTREX) 500 mg/250 ml infusion 2.5-40 mcg/kg/min Intravenous Continuous PRN   nitroGLYCERIN infusion 50mg in D5W 250ml 5-300 mcg/min

## 2018-02-08 NOTE — PHYSICAL THERAPY NOTE
PHYSICAL THERAPY TREATMENT NOTE - INPATIENT    Room Number: 2344/3780-W     Session: 2 since re-eval  Number of Visits to Meet Established Goals: 5    Presenting Problem: AVR 1/26 s/p intubation 2/2     History related to current admission: admitted for A Static Sitting: Good  Dynamic Sitting: Fair +           Static Standing: Fair +  Dynamic Standing: Fair +    ACTIVITY TOLERANCE  O2 Saturation: 92-94%  O2 Dev RN and PCT aware of Pt participation and education provided in the form of handouts and discussion regarding activity recommendations following cardiac procedure.       Patient End of Session: Up in chair;Needs met;Call light within reach;RN aware of se

## 2018-02-08 NOTE — PAYOR COMM NOTE
--------------  CONTINUED STAY REVIEW    Payor: ABBY DUONG  Subscriber #:  [id number]  Authorization Number: [id number]    Admit date: 1/26/18  Admit time: 1674    Admitting Physician: Penelope Penn MD  Attending Physician:  Penelope Penn MD  Primary Care 2/7/2018 2003 Given 2 tablet Oral Tosha Tabor [facility]    2/7/2018 1648 Given 2 tablet Oral Maria L MARTI RN      melatonin tab TABS 1 mg     Date Action Dose Route User    2/7/2018 1958 Given 1 mg Oral Carl Bermudez RN      Metoprolol [place]

## 2018-02-08 NOTE — PROGRESS NOTES
[facility]  CV Surgery Progress Note    Claudia Darling Patient Status:  Inpatient    10/6/1960 MRN [id number]   [facility] 6NE-A Attending Ashlee Jacobs MD   [address] Day # 15 PCP Matty Paula DO     Subjective:  Looks great.  No comp

## 2018-02-08 NOTE — PROGRESS NOTES
Pulmonary Progress Note        NAME: Rod  - ROOM: 6022/8675-W - MRN: [id number] - Age: 59year old - : 10/6/1960        SUBJECTIVE: No events overnight, feels good today, thinks that breathing is better than a few weeks ago    OBJECTIVE: rhythm  Abdomen: soft, non-tender; bowel sounds normal; no masses,  no organomegaly  Extremities: extremities normal, atraumatic, no cyanosis or edema    No results for input(s): WBC, HGB, MCV, PLT, BAND, INR in the last 72 hours.     Invalid input(s): LYM#

## 2018-02-09 LAB
ATRIAL RATE: 75 BPM
P AXIS: 63 DEGREES
P-R INTERVAL: 138 MS
Q-T INTERVAL: 434 MS
QRS DURATION: 98 MS
QTC CALCULATION (BEZET): 484 MS
R AXIS: -14 DEGREES
T AXIS: 92 DEGREES
VENTRICULAR RATE: 75 BPM

## 2018-02-09 NOTE — CM/SW NOTE
02/09/18 1200   Discharge disposition   Discharged to: Home-Health   Name of Facillity/Home Care/Hospice Residential   HME provider Other (comment)  (Jenn Moctezuma)   Discharge transportation Private car

## 2018-02-12 ENCOUNTER — LAB REQUISITION (OUTPATIENT)
Dept: LAB | Facility: HOSPITAL | Age: 58
End: 2018-02-12
Attending: INTERNAL MEDICINE
Payer: COMMERCIAL

## 2018-02-12 ENCOUNTER — TELEPHONE (OUTPATIENT)
Dept: CARDIOLOGY UNIT | Facility: HOSPITAL | Age: 58
End: 2018-02-12

## 2018-02-12 DIAGNOSIS — R69 ILLNESS: ICD-10-CM

## 2018-02-12 LAB
BUN BLD-MCNC: 13 MG/DL (ref 8–20)
CALCIUM BLD-MCNC: 9.1 MG/DL (ref 8.3–10.3)
CHLORIDE: 101 MMOL/L (ref 101–111)
CO2: 29 MMOL/L (ref 22–32)
CREAT BLD-MCNC: 0.75 MG/DL (ref 0.55–1.02)
GLUCOSE BLD-MCNC: 96 MG/DL (ref 70–99)
POTASSIUM SERPL-SCNC: 4.6 MMOL/L (ref 3.6–5.1)
SODIUM SERPL-SCNC: 139 MMOL/L (ref 136–144)

## 2018-02-12 PROCEDURE — 80048 BASIC METABOLIC PNL TOTAL CA: CPT | Performed by: INTERNAL MEDICINE

## 2018-02-12 NOTE — PROGRESS NOTES
Follow Up Phone Call    1. How are you doing now that you are home? \"I am doing ok, getting a little better every day. \"    2. Have there been any changes in your wound/incision since going home? \"no, it looks ok, like it did in the hospital.\"    3. Is your pain manageable at home? \"not really having any pain. \"    4. Are you following the walking routine given to you in the hospital? yes    5. Are you continuing to use your incentive spirometer? \"yes, and my home oxygen- I can really tell when I don't have it on. \"    6. Do you have your appointments for Chest Xray? 2/13 1000                                                              Primary MD?  2/15 dr babb                                                              Cardiologist? Dr Denisse Wellington tomorrow                                                              Dr. Roma De La Rosa? 2/28                                                              Cardiac Rehab 3/6    7. Do you have any other questions or concerns today? \"no, just a few questions to go lyndsey with Dr Surinder Lee at tomorrow's appointment. \"        Yu Nolasco  2/12/2018  12:36 PM

## 2018-02-13 ENCOUNTER — HOSPITAL ENCOUNTER (OUTPATIENT)
Dept: GENERAL RADIOLOGY | Facility: HOSPITAL | Age: 58
Discharge: HOME | End: 2018-02-13
Attending: THORACIC SURGERY (CARDIOTHORACIC VASCULAR SURGERY)
Payer: COMMERCIAL

## 2018-02-13 DIAGNOSIS — Z98.890 HISTORY OF OPEN HEART SURGERY: ICD-10-CM

## 2018-02-13 PROBLEM — Q23.1 BICUSPID AORTIC VALVE: Status: RESOLVED | Noted: 2017-11-17 | Resolved: 2018-02-13

## 2018-02-13 PROBLEM — Z95.2 AORTIC VALVE REPLACED: Status: ACTIVE | Noted: 2018-02-13

## 2018-02-13 PROCEDURE — 71048 X-RAY EXAM CHEST 4+ VIEWS: CPT | Performed by: THORACIC SURGERY (CARDIOTHORACIC VASCULAR SURGERY)

## 2018-02-13 PROCEDURE — 71046 X-RAY EXAM CHEST 2 VIEWS: CPT | Performed by: THORACIC SURGERY (CARDIOTHORACIC VASCULAR SURGERY)

## 2018-02-15 NOTE — PROGRESS NOTES
[facility] Cardiology  Progress Note           Discovery Bayshivani Durham Patient Status:  Inpatient    10/6/1960 MRN [id number]   [facility] 6NE-A Attending Piotr Gutierres MD   Hosp Day # 0 PCP Margaux Prasad DO      Impression:  1.  Leyla Mejia Intravenous Q30 Min PRN   propofol (DIPRIVAN) infusion 5-100 mcg/kg/min Intravenous Continuous   HYDROmorphone (DILAUDID) 0.2 mg/ml continuous infusion 0.1-0.2 mg/hr Intravenous Continuous   Insulin Regular Human (NOVOLIN R) 100 Units in sodium chloride 0. infusion 0.1-4 mcg/kg/min Intravenous Continuous PRN   aspirin 300 MG rectal suppository 300 mg 300 mg Rectal Once   Or         aspirin tab 325 mg 325 mg Per NG Tube Once   [START ON 1/27/2018] aspirin EC tab 81 mg 81 mg Oral Daily   Or         [START ON 1       Lab Results  Component Value Date    01/26/2018   K 4.8 01/26/2018    01/26/2018   CO2 25.0 01/26/2018   BUN 15 01/26/2018   CREATSERUM 0.96 01/26/2018    01/26/2018   CA 8.1 01/26/2018   MG 2.5 01/26/2018         Telemetry: No m

## 2018-03-06 ENCOUNTER — CARDPULM VISIT (OUTPATIENT)
Dept: CARDIAC REHAB | Facility: HOSPITAL | Age: 58
End: 2018-03-06
Attending: INTERNAL MEDICINE
Payer: COMMERCIAL

## 2018-03-06 VITALS
HEART RATE: 89 BPM | SYSTOLIC BLOOD PRESSURE: 138 MMHG | DIASTOLIC BLOOD PRESSURE: 70 MMHG | HEIGHT: 63.78 IN | BODY MASS INDEX: 31.63 KG/M2 | WEIGHT: 183 LBS | OXYGEN SATURATION: 96 %

## 2018-03-06 PROCEDURE — 93798 PHYS/QHP OP CAR RHAB W/ECG: CPT

## 2018-03-06 RX ORDER — CHOLECALCIFEROL (VITAMIN D3) 50 MCG
1 CAPSULE ORAL DAILY
COMMUNITY

## 2018-03-07 ENCOUNTER — CARDPULM VISIT (OUTPATIENT)
Dept: CARDIAC REHAB | Facility: HOSPITAL | Age: 58
End: 2018-03-07
Attending: INTERNAL MEDICINE
Payer: COMMERCIAL

## 2018-03-07 PROCEDURE — 93798 PHYS/QHP OP CAR RHAB W/ECG: CPT

## 2018-03-09 ENCOUNTER — CARDPULM VISIT (OUTPATIENT)
Dept: CARDIAC REHAB | Facility: HOSPITAL | Age: 58
End: 2018-03-09
Attending: INTERNAL MEDICINE
Payer: COMMERCIAL

## 2018-03-09 PROCEDURE — 93798 PHYS/QHP OP CAR RHAB W/ECG: CPT

## 2018-03-09 NOTE — DISCHARGE SUMMARY
[facility]  Discharge Summary    Edgardo Shafer Patient Status:  Inpatient    10/6/1960 MRN [id number]   [facility] 8NE-A Attending No att. providers found   [address] Day # 15 PCP Tom Odonnell DO     Admit date: 2018    [address] [facility]

## 2018-03-12 ENCOUNTER — CARDPULM VISIT (OUTPATIENT)
Dept: CARDIAC REHAB | Facility: HOSPITAL | Age: 58
End: 2018-03-12
Attending: INTERNAL MEDICINE
Payer: COMMERCIAL

## 2018-03-12 PROCEDURE — 93798 PHYS/QHP OP CAR RHAB W/ECG: CPT

## 2018-03-13 ENCOUNTER — HOSPITAL ENCOUNTER (OUTPATIENT)
Dept: GENERAL RADIOLOGY | Facility: HOSPITAL | Age: 58
Discharge: HOME | End: 2018-03-13
Attending: INTERNAL MEDICINE
Payer: COMMERCIAL

## 2018-03-13 DIAGNOSIS — Z87.09 HISTORY OF ARDS: ICD-10-CM

## 2018-03-13 PROCEDURE — 71046 X-RAY EXAM CHEST 2 VIEWS: CPT | Performed by: INTERNAL MEDICINE

## 2018-03-14 ENCOUNTER — CARDPULM VISIT (OUTPATIENT)
Dept: CARDIAC REHAB | Facility: HOSPITAL | Age: 58
End: 2018-03-14
Attending: INTERNAL MEDICINE
Payer: COMMERCIAL

## 2018-03-14 PROCEDURE — 93798 PHYS/QHP OP CAR RHAB W/ECG: CPT

## 2018-03-16 ENCOUNTER — CARDPULM VISIT (OUTPATIENT)
Dept: CARDIAC REHAB | Facility: HOSPITAL | Age: 58
End: 2018-03-16
Attending: INTERNAL MEDICINE
Payer: COMMERCIAL

## 2018-03-16 PROCEDURE — 93798 PHYS/QHP OP CAR RHAB W/ECG: CPT

## 2018-03-19 ENCOUNTER — CARDPULM VISIT (OUTPATIENT)
Dept: CARDIAC REHAB | Facility: HOSPITAL | Age: 58
End: 2018-03-19
Attending: INTERNAL MEDICINE
Payer: COMMERCIAL

## 2018-03-19 PROCEDURE — 93798 PHYS/QHP OP CAR RHAB W/ECG: CPT

## 2018-03-21 ENCOUNTER — CARDPULM VISIT (OUTPATIENT)
Dept: CARDIAC REHAB | Facility: HOSPITAL | Age: 58
End: 2018-03-21
Attending: INTERNAL MEDICINE
Payer: COMMERCIAL

## 2018-03-21 PROCEDURE — 93798 PHYS/QHP OP CAR RHAB W/ECG: CPT

## 2018-03-23 ENCOUNTER — APPOINTMENT (OUTPATIENT)
Dept: CARDIAC REHAB | Facility: HOSPITAL | Age: 58
End: 2018-03-23
Attending: INTERNAL MEDICINE
Payer: COMMERCIAL

## 2018-03-26 ENCOUNTER — CARDPULM VISIT (OUTPATIENT)
Dept: CARDIAC REHAB | Facility: HOSPITAL | Age: 58
End: 2018-03-26
Attending: INTERNAL MEDICINE
Payer: COMMERCIAL

## 2018-03-26 PROCEDURE — 93798 PHYS/QHP OP CAR RHAB W/ECG: CPT

## 2018-03-27 ENCOUNTER — CARDPULM VISIT (OUTPATIENT)
Dept: CARDIAC REHAB | Facility: HOSPITAL | Age: 58
End: 2018-03-27
Attending: INTERNAL MEDICINE
Payer: COMMERCIAL

## 2018-03-27 PROCEDURE — 93798 PHYS/QHP OP CAR RHAB W/ECG: CPT

## 2018-03-28 ENCOUNTER — CARDPULM VISIT (OUTPATIENT)
Dept: CARDIAC REHAB | Facility: HOSPITAL | Age: 58
End: 2018-03-28
Attending: INTERNAL MEDICINE
Payer: COMMERCIAL

## 2018-03-28 PROCEDURE — 93798 PHYS/QHP OP CAR RHAB W/ECG: CPT

## 2018-03-30 ENCOUNTER — CARDPULM VISIT (OUTPATIENT)
Dept: CARDIAC REHAB | Facility: HOSPITAL | Age: 58
End: 2018-03-30
Attending: INTERNAL MEDICINE
Payer: COMMERCIAL

## 2018-03-30 PROCEDURE — 93798 PHYS/QHP OP CAR RHAB W/ECG: CPT

## 2018-04-02 ENCOUNTER — CARDPULM VISIT (OUTPATIENT)
Dept: CARDIAC REHAB | Facility: HOSPITAL | Age: 58
End: 2018-04-02
Attending: INTERNAL MEDICINE
Payer: COMMERCIAL

## 2018-04-02 PROCEDURE — 93798 PHYS/QHP OP CAR RHAB W/ECG: CPT

## 2018-04-04 ENCOUNTER — CARDPULM VISIT (OUTPATIENT)
Dept: CARDIAC REHAB | Facility: HOSPITAL | Age: 58
End: 2018-04-04
Attending: INTERNAL MEDICINE
Payer: COMMERCIAL

## 2018-04-04 PROCEDURE — 93798 PHYS/QHP OP CAR RHAB W/ECG: CPT

## 2018-04-09 ENCOUNTER — CARDPULM VISIT (OUTPATIENT)
Dept: CARDIAC REHAB | Facility: HOSPITAL | Age: 58
End: 2018-04-09
Attending: INTERNAL MEDICINE
Payer: COMMERCIAL

## 2018-04-09 PROCEDURE — 93798 PHYS/QHP OP CAR RHAB W/ECG: CPT

## 2018-04-11 ENCOUNTER — CARDPULM VISIT (OUTPATIENT)
Dept: CARDIAC REHAB | Facility: HOSPITAL | Age: 58
End: 2018-04-11
Attending: INTERNAL MEDICINE
Payer: COMMERCIAL

## 2018-04-11 PROCEDURE — 93798 PHYS/QHP OP CAR RHAB W/ECG: CPT

## 2018-04-13 ENCOUNTER — CARDPULM VISIT (OUTPATIENT)
Dept: CARDIAC REHAB | Facility: HOSPITAL | Age: 58
End: 2018-04-13
Attending: INTERNAL MEDICINE
Payer: COMMERCIAL

## 2018-04-13 PROCEDURE — 93798 PHYS/QHP OP CAR RHAB W/ECG: CPT

## 2018-04-16 ENCOUNTER — CARDPULM VISIT (OUTPATIENT)
Dept: CARDIAC REHAB | Facility: HOSPITAL | Age: 58
End: 2018-04-16
Attending: INTERNAL MEDICINE
Payer: COMMERCIAL

## 2018-04-16 PROCEDURE — 93798 PHYS/QHP OP CAR RHAB W/ECG: CPT

## 2018-04-18 ENCOUNTER — CARDPULM VISIT (OUTPATIENT)
Dept: CARDIAC REHAB | Facility: HOSPITAL | Age: 58
End: 2018-04-18
Attending: INTERNAL MEDICINE
Payer: COMMERCIAL

## 2018-04-18 PROCEDURE — 93798 PHYS/QHP OP CAR RHAB W/ECG: CPT

## 2018-04-20 ENCOUNTER — CARDPULM VISIT (OUTPATIENT)
Dept: CARDIAC REHAB | Facility: HOSPITAL | Age: 58
End: 2018-04-20
Attending: INTERNAL MEDICINE
Payer: COMMERCIAL

## 2018-04-20 PROCEDURE — 93798 PHYS/QHP OP CAR RHAB W/ECG: CPT

## 2018-04-23 ENCOUNTER — CARDPULM VISIT (OUTPATIENT)
Dept: CARDIAC REHAB | Facility: HOSPITAL | Age: 58
End: 2018-04-23
Attending: INTERNAL MEDICINE
Payer: COMMERCIAL

## 2018-04-23 PROCEDURE — 93798 PHYS/QHP OP CAR RHAB W/ECG: CPT

## 2018-04-25 ENCOUNTER — APPOINTMENT (OUTPATIENT)
Dept: CARDIAC REHAB | Facility: HOSPITAL | Age: 58
End: 2018-04-25
Attending: INTERNAL MEDICINE
Payer: COMMERCIAL

## 2018-04-25 PROCEDURE — 93798 PHYS/QHP OP CAR RHAB W/ECG: CPT

## 2018-04-27 ENCOUNTER — APPOINTMENT (OUTPATIENT)
Dept: CARDIAC REHAB | Facility: HOSPITAL | Age: 58
End: 2018-04-27
Attending: INTERNAL MEDICINE
Payer: COMMERCIAL

## 2018-04-27 PROCEDURE — 93798 PHYS/QHP OP CAR RHAB W/ECG: CPT

## 2018-04-30 ENCOUNTER — CARDPULM VISIT (OUTPATIENT)
Dept: CARDIAC REHAB | Facility: HOSPITAL | Age: 58
End: 2018-04-30
Attending: INTERNAL MEDICINE
Payer: COMMERCIAL

## 2018-04-30 PROCEDURE — 93798 PHYS/QHP OP CAR RHAB W/ECG: CPT

## 2018-05-02 ENCOUNTER — APPOINTMENT (OUTPATIENT)
Dept: CARDIAC REHAB | Facility: HOSPITAL | Age: 58
End: 2018-05-02
Attending: INTERNAL MEDICINE
Payer: COMMERCIAL

## 2018-05-02 PROCEDURE — 93798 PHYS/QHP OP CAR RHAB W/ECG: CPT

## 2018-05-04 ENCOUNTER — APPOINTMENT (OUTPATIENT)
Dept: CARDIAC REHAB | Facility: HOSPITAL | Age: 58
End: 2018-05-04
Attending: INTERNAL MEDICINE
Payer: COMMERCIAL

## 2018-05-04 PROCEDURE — 93798 PHYS/QHP OP CAR RHAB W/ECG: CPT

## 2018-05-04 RX ORDER — CETIRIZINE HYDROCHLORIDE 5 MG/1
5 TABLET ORAL DAILY
COMMUNITY
End: 2018-05-04

## 2018-05-04 RX ORDER — LEVOCETIRIZINE DIHYDROCHLORIDE 5 MG/1
5 TABLET, FILM COATED ORAL EVERY EVENING
COMMUNITY
End: 2018-05-30

## 2018-05-07 ENCOUNTER — APPOINTMENT (OUTPATIENT)
Dept: CARDIAC REHAB | Facility: HOSPITAL | Age: 58
End: 2018-05-07
Attending: INTERNAL MEDICINE
Payer: COMMERCIAL

## 2018-05-07 PROCEDURE — 93798 PHYS/QHP OP CAR RHAB W/ECG: CPT

## 2018-05-09 ENCOUNTER — CARDPULM VISIT (OUTPATIENT)
Dept: CARDIAC REHAB | Facility: HOSPITAL | Age: 58
End: 2018-05-09
Attending: INTERNAL MEDICINE
Payer: COMMERCIAL

## 2018-05-09 PROCEDURE — 93798 PHYS/QHP OP CAR RHAB W/ECG: CPT

## 2018-05-11 ENCOUNTER — APPOINTMENT (OUTPATIENT)
Dept: CARDIAC REHAB | Facility: HOSPITAL | Age: 58
End: 2018-05-11
Attending: INTERNAL MEDICINE
Payer: COMMERCIAL

## 2018-05-11 PROCEDURE — 93798 PHYS/QHP OP CAR RHAB W/ECG: CPT

## 2018-05-14 ENCOUNTER — APPOINTMENT (OUTPATIENT)
Dept: CARDIAC REHAB | Facility: HOSPITAL | Age: 58
End: 2018-05-14
Attending: INTERNAL MEDICINE
Payer: COMMERCIAL

## 2018-05-16 ENCOUNTER — APPOINTMENT (OUTPATIENT)
Dept: CARDIAC REHAB | Facility: HOSPITAL | Age: 58
End: 2018-05-16
Attending: INTERNAL MEDICINE
Payer: COMMERCIAL

## 2018-05-18 ENCOUNTER — CARDPULM VISIT (OUTPATIENT)
Dept: CARDIAC REHAB | Facility: HOSPITAL | Age: 58
End: 2018-05-18
Attending: INTERNAL MEDICINE
Payer: COMMERCIAL

## 2018-05-18 PROCEDURE — 93798 PHYS/QHP OP CAR RHAB W/ECG: CPT

## 2018-05-21 ENCOUNTER — APPOINTMENT (OUTPATIENT)
Dept: CARDIAC REHAB | Facility: HOSPITAL | Age: 58
End: 2018-05-21
Attending: INTERNAL MEDICINE
Payer: COMMERCIAL

## 2018-05-23 ENCOUNTER — APPOINTMENT (OUTPATIENT)
Dept: CARDIAC REHAB | Facility: HOSPITAL | Age: 58
End: 2018-05-23
Attending: INTERNAL MEDICINE
Payer: COMMERCIAL

## 2018-05-23 PROCEDURE — 93798 PHYS/QHP OP CAR RHAB W/ECG: CPT

## 2018-05-25 ENCOUNTER — CARDPULM VISIT (OUTPATIENT)
Dept: CARDIAC REHAB | Facility: HOSPITAL | Age: 58
End: 2018-05-25
Attending: INTERNAL MEDICINE
Payer: COMMERCIAL

## 2018-05-25 PROCEDURE — 93798 PHYS/QHP OP CAR RHAB W/ECG: CPT

## 2018-05-28 ENCOUNTER — APPOINTMENT (OUTPATIENT)
Dept: CARDIAC REHAB | Facility: HOSPITAL | Age: 58
End: 2018-05-28
Attending: INTERNAL MEDICINE
Payer: COMMERCIAL

## 2018-05-30 ENCOUNTER — APPOINTMENT (OUTPATIENT)
Dept: CARDIAC REHAB | Facility: HOSPITAL | Age: 58
End: 2018-05-30
Attending: INTERNAL MEDICINE
Payer: COMMERCIAL

## 2018-05-30 PROCEDURE — 93798 PHYS/QHP OP CAR RHAB W/ECG: CPT

## 2018-06-01 ENCOUNTER — APPOINTMENT (OUTPATIENT)
Dept: CARDIAC REHAB | Facility: HOSPITAL | Age: 58
End: 2018-06-01
Attending: INTERNAL MEDICINE
Payer: COMMERCIAL

## 2018-06-01 PROCEDURE — 93798 PHYS/QHP OP CAR RHAB W/ECG: CPT

## 2018-06-04 ENCOUNTER — CARDPULM VISIT (OUTPATIENT)
Dept: CARDIAC REHAB | Facility: HOSPITAL | Age: 58
End: 2018-06-04
Attending: INTERNAL MEDICINE
Payer: COMMERCIAL

## 2018-06-04 PROCEDURE — 93798 PHYS/QHP OP CAR RHAB W/ECG: CPT

## 2018-06-06 ENCOUNTER — CARDPULM VISIT (OUTPATIENT)
Dept: CARDIAC REHAB | Facility: HOSPITAL | Age: 58
End: 2018-06-06
Attending: INTERNAL MEDICINE
Payer: COMMERCIAL

## 2018-06-06 PROCEDURE — 93798 PHYS/QHP OP CAR RHAB W/ECG: CPT

## 2018-06-08 ENCOUNTER — CARDPULM VISIT (OUTPATIENT)
Dept: CARDIAC REHAB | Facility: HOSPITAL | Age: 58
End: 2018-06-08
Attending: INTERNAL MEDICINE
Payer: COMMERCIAL

## 2018-06-08 PROCEDURE — 93798 PHYS/QHP OP CAR RHAB W/ECG: CPT

## 2018-08-15 ENCOUNTER — APPOINTMENT (OUTPATIENT)
Dept: CT IMAGING | Facility: HOSPITAL | Age: 58
End: 2018-08-15
Attending: EMERGENCY MEDICINE
Payer: COMMERCIAL

## 2018-08-15 ENCOUNTER — APPOINTMENT (OUTPATIENT)
Dept: GENERAL RADIOLOGY | Facility: HOSPITAL | Age: 58
End: 2018-08-15
Attending: EMERGENCY MEDICINE
Payer: COMMERCIAL

## 2018-08-15 ENCOUNTER — HOSPITAL ENCOUNTER (EMERGENCY)
Facility: HOSPITAL | Age: 58
Discharge: HOME | End: 2018-08-15
Attending: EMERGENCY MEDICINE
Payer: COMMERCIAL

## 2018-08-15 VITALS
HEIGHT: 64 IN | BODY MASS INDEX: 31.24 KG/M2 | OXYGEN SATURATION: 95 % | DIASTOLIC BLOOD PRESSURE: 60 MMHG | HEART RATE: 79 BPM | TEMPERATURE: 99 F | WEIGHT: 183 LBS | RESPIRATION RATE: 15 BRPM | SYSTOLIC BLOOD PRESSURE: 127 MMHG

## 2018-08-15 DIAGNOSIS — R07.89 CHEST WALL PAIN: Primary | ICD-10-CM

## 2018-08-15 LAB
ALBUMIN SERPL-MCNC: 3.7 G/DL (ref 3.5–4.8)
ALBUMIN/GLOB SERPL: 1.1 {RATIO} (ref 1–2)
ALP LIVER SERPL-CCNC: 75 U/L (ref 46–118)
ALT SERPL-CCNC: 57 U/L (ref 14–54)
ANION GAP SERPL CALC-SCNC: 7 MMOL/L (ref 0–18)
AST SERPL-CCNC: 46 U/L (ref 15–41)
ATRIAL RATE: 76 BPM
BASOPHILS # BLD AUTO: 0.1 X10(3) UL (ref 0–0.1)
BASOPHILS NFR BLD AUTO: 1.3 %
BILIRUB SERPL-MCNC: 0.4 MG/DL (ref 0.1–2)
BUN BLD-MCNC: 20 MG/DL (ref 8–20)
BUN/CREAT SERPL: 23.5 (ref 10–20)
CALCIUM BLD-MCNC: 8.3 MG/DL (ref 8.3–10.3)
CHLORIDE SERPL-SCNC: 110 MMOL/L (ref 101–111)
CO2 SERPL-SCNC: 26 MMOL/L (ref 22–32)
CREAT BLD-MCNC: 0.85 MG/DL (ref 0.55–1.02)
EOSINOPHIL # BLD AUTO: 0.38 X10(3) UL (ref 0–0.3)
EOSINOPHIL NFR BLD AUTO: 4.9 %
ERYTHROCYTE [DISTWIDTH] IN BLOOD BY AUTOMATED COUNT: 13.2 % (ref 11.5–16)
GLOBULIN PLAS-MCNC: 3.3 G/DL (ref 2.5–3.7)
GLUCOSE BLD-MCNC: 102 MG/DL (ref 70–99)
HCT VFR BLD AUTO: 34.7 % (ref 34–50)
HGB BLD-MCNC: 11.4 G/DL (ref 12–16)
IMMATURE GRANULOCYTE COUNT: 0.01 X10(3) UL (ref 0–1)
IMMATURE GRANULOCYTE RATIO %: 0.1 %
LYMPHOCYTES # BLD AUTO: 3.77 X10(3) UL (ref 0.9–4)
LYMPHOCYTES NFR BLD AUTO: 49 %
M PROTEIN MFR SERPL ELPH: 7 G/DL (ref 6.1–8.3)
MCH RBC QN AUTO: 31.8 PG (ref 27–33.2)
MCHC RBC AUTO-ENTMCNC: 32.9 G/DL (ref 31–37)
MCV RBC AUTO: 96.9 FL (ref 81–100)
MONOCYTES # BLD AUTO: 0.63 X10(3) UL (ref 0.1–1)
MONOCYTES NFR BLD AUTO: 8.2 %
NEUTROPHIL ABS PRELIM: 2.81 X10 (3) UL (ref 1.3–6.7)
NEUTROPHILS # BLD AUTO: 2.81 X10(3) UL (ref 1.3–6.7)
NEUTROPHILS NFR BLD AUTO: 36.5 %
OSMOLALITY SERPL CALC.SUM OF ELEC: 299 MOSM/KG (ref 275–295)
P AXIS: 61 DEGREES
P-R INTERVAL: 94 MS
PLATELET # BLD AUTO: 281 10(3)UL (ref 150–450)
POTASSIUM SERPL-SCNC: 4 MMOL/L (ref 3.6–5.1)
Q-T INTERVAL: 414 MS
QRS DURATION: 90 MS
QTC CALCULATION (BEZET): 465 MS
R AXIS: 51 DEGREES
RBC # BLD AUTO: 3.58 X10(6)UL (ref 3.8–5.1)
RED CELL DISTRIBUTION WIDTH-SD: 47.4 FL (ref 35.1–46.3)
SODIUM SERPL-SCNC: 143 MMOL/L (ref 136–144)
T AXIS: 91 DEGREES
TROPONIN I SERPL-MCNC: <0.046 NG/ML (ref ?–0.05)
VENTRICULAR RATE: 76 BPM
WBC # BLD AUTO: 7.7 X10(3) UL (ref 4–13)

## 2018-08-15 PROCEDURE — 84484 ASSAY OF TROPONIN QUANT: CPT | Performed by: EMERGENCY MEDICINE

## 2018-08-15 PROCEDURE — 80053 COMPREHEN METABOLIC PANEL: CPT | Performed by: EMERGENCY MEDICINE

## 2018-08-15 PROCEDURE — 99285 EMERGENCY DEPT VISIT HI MDM: CPT

## 2018-08-15 PROCEDURE — 71045 X-RAY EXAM CHEST 1 VIEW: CPT | Performed by: EMERGENCY MEDICINE

## 2018-08-15 PROCEDURE — 96375 TX/PRO/DX INJ NEW DRUG ADDON: CPT

## 2018-08-15 PROCEDURE — 96361 HYDRATE IV INFUSION ADD-ON: CPT

## 2018-08-15 PROCEDURE — 93010 ELECTROCARDIOGRAM REPORT: CPT

## 2018-08-15 PROCEDURE — 85025 COMPLETE CBC W/AUTO DIFF WBC: CPT | Performed by: EMERGENCY MEDICINE

## 2018-08-15 PROCEDURE — 93005 ELECTROCARDIOGRAM TRACING: CPT

## 2018-08-15 PROCEDURE — 71275 CT ANGIOGRAPHY CHEST: CPT | Performed by: EMERGENCY MEDICINE

## 2018-08-15 PROCEDURE — 96374 THER/PROPH/DIAG INJ IV PUSH: CPT

## 2018-08-15 RX ORDER — ONDANSETRON 2 MG/ML
4 INJECTION INTRAMUSCULAR; INTRAVENOUS ONCE
Status: COMPLETED | OUTPATIENT
Start: 2018-08-15 | End: 2018-08-15

## 2018-08-15 RX ORDER — SODIUM CHLORIDE 9 MG/ML
125 INJECTION, SOLUTION INTRAVENOUS CONTINUOUS
Status: DISCONTINUED | OUTPATIENT
Start: 2018-08-15 | End: 2018-08-15

## 2018-08-15 RX ORDER — HYDROCODONE BITARTRATE AND ACETAMINOPHEN 5; 325 MG/1; MG/1
1-2 TABLET ORAL EVERY 4 HOURS PRN
Qty: 10 TABLET | Refills: 0 | Status: SHIPPED | OUTPATIENT
Start: 2018-08-15 | End: 2018-08-15

## 2018-08-15 RX ORDER — MORPHINE SULFATE 4 MG/ML
4 INJECTION, SOLUTION INTRAMUSCULAR; INTRAVENOUS EVERY 30 MIN PRN
Status: DISCONTINUED | OUTPATIENT
Start: 2018-08-15 | End: 2018-08-15

## 2018-08-15 NOTE — ED PROVIDER NOTES
Patient Seen in: [facility] Emergency Department    History   Patient presents with:  Dyspnea FAITH SOB (respiratory)    Stated Complaint: Leticia Moreno is a 49-year-old female presenting to the emergency department for rib pa Used                      Comment: when she smokes-smokes a few a day  Alcohol use: Yes           0.0 oz/week     Comment: socially/ cage questions answered      Review of Systems    Positive for stated complaint: FAITH, LEFT RIB PAIN  Other systems are as n GREEN   RAINBOW DRAW GOLD     EKG    Rate, intervals and axes as noted on EKG Report.   Rate: 76  Rhythm: Sinus Rhythm  Reading: Short DE interval noted           ED Course as of Aug 15 0350  ------------------------------------------------------------

## 2018-08-15 NOTE — ED INITIAL ASSESSMENT (HPI)
C/o cough since April, \"getting worse with shortness of breath and L rib pain since Monday. \" Pt denies injury, states she was placed on Levaquin for possible \"lung infection based on Xray. \"

## 2018-11-02 ENCOUNTER — TELEPHONE (OUTPATIENT)
Dept: SURGERY | Facility: CLINIC | Age: 58
End: 2018-11-02

## 2018-11-02 PROCEDURE — 83883 ASSAY NEPHELOMETRY NOT SPEC: CPT | Performed by: OTHER

## 2018-11-02 PROCEDURE — 84165 PROTEIN E-PHORESIS SERUM: CPT | Performed by: OTHER

## 2018-11-02 PROCEDURE — 82164 ANGIOTENSIN I ENZYME TEST: CPT | Performed by: OTHER

## 2018-11-02 PROCEDURE — 84425 ASSAY OF VITAMIN B-1: CPT | Performed by: OTHER

## 2018-11-02 PROCEDURE — 83921 ORGANIC ACID SINGLE QUANT: CPT | Performed by: OTHER

## 2018-11-02 PROCEDURE — 86334 IMMUNOFIX E-PHORESIS SERUM: CPT | Performed by: OTHER

## 2018-11-02 NOTE — TELEPHONE ENCOUNTER
Patient should be evaluated by Neuro-IR first to discuss aneurysm treatment options and possible cerebral angiogram.

## 2018-11-02 NOTE — TELEPHONE ENCOUNTER
Patient was refd to Dr. Lance Blizzard for cerebral aneurysm, found two weeks on CT scan for sinus problems. Brain scan was done at [facility]. 4 x 4 mm anterior communicating artery aneurysm. Second one 2.1 mm left MCA bifurcation aneurysm.

## 2018-11-13 ENCOUNTER — OFFICE VISIT (OUTPATIENT)
Dept: SURGERY | Facility: CLINIC | Age: 58
End: 2018-11-13
Payer: COMMERCIAL

## 2018-11-13 VITALS
BODY MASS INDEX: 33.8 KG/M2 | HEART RATE: 78 BPM | DIASTOLIC BLOOD PRESSURE: 74 MMHG | WEIGHT: 198 LBS | HEIGHT: 64 IN | SYSTOLIC BLOOD PRESSURE: 134 MMHG

## 2018-11-13 DIAGNOSIS — I67.1 CEREBRAL ANEURYSM WITHOUT RUPTURE: Primary | ICD-10-CM

## 2018-11-13 PROCEDURE — 99204 OFFICE O/P NEW MOD 45 MIN: CPT | Performed by: NURSE PRACTITIONER

## 2018-11-13 NOTE — H&P
[facility]  Interventional Neuroradiology Clinic Note        Neurology  [address] [place], [place]  Primary Care      Date of Service: 11/13/18    Dear Ricky Hargrove,     We had the pleasure of seeing [place] in the In Past Medical History:   Diagnosis Date   • ARDS (adult respiratory distress syndrome) ([facility] [address] 01/2018   • Carotid bruit present    • Depression    • Headache    • High blood pressure    • High cholesterol    • HYPERLIPIDEMIA    • Migraines    • Usual hyperpl •  gabapentin 300 MG Oral Cap, Take 1 capsule (300 mg total) by mouth 3 (three) times daily. , Disp: 90 capsule, Rfl: 11  •  Ipratropium Bromide 0.03 % Nasal Solution, 2 sprays by Nasal route 3 (three) times daily. , Disp: 2 Bottle, Rfl: 1  •  POTASSIUM CHLO Uvula and palate elevate symmetrically. Shoulder shrug normal bilaterally,   All other CN's 2-12 Grossly Intact. Sensation to light touch is intact bilaterally.     Motor:  5/5 strength, No focal arm or leg weakness noted, tone normal.    Coord: Coco We described the coil embolization procedure with balloon/stent assisted techniques or flow diverting stent reconstruction procedure for the endovascular treatment of her intracranial aneurysm, the indications, benefits, risks/complications, and alternativ I spent approximately 45 minutes with the patient with at least half the time spent on counseling the patient on her pathology and conservative medical management/treatment plan.

## 2018-11-13 NOTE — PATIENT INSTRUCTIONS
Refill policies:    • Allow 2-3 business days for refills; controlled substances may take longer.   • Contact your pharmacy at least 5 days prior to running out of medication and have them send an electronic request or submit request through the “request re entire amount billed. Precertification and Prior Authorizations: If your physician has recommended that you have a procedure or additional testing performed.   [facility]) will contact your insurance carrier to obtain pre-certi care of acute onset thunderclap headache or stroke like symptoms.

## 2018-11-13 NOTE — PROGRESS NOTES
New Pt is here for Cerebral aneurysm. CT of the brain.        Review of Systems:    Hand Dominance: left  General: weight change and fatigue  Neuro: no symptoms reported  Head: dizziness/spinning  Musculoskeletal: History of fracture   Cardiovascular: no s

## 2018-11-29 ENCOUNTER — TELEPHONE (OUTPATIENT)
Dept: SURGERY | Facility: CLINIC | Age: 58
End: 2018-11-29

## 2018-11-29 NOTE — TELEPHONE ENCOUNTER
Patient states she was told that her case would be presented to several doctors on the 16th and that she would receive a call the following week Monday or Tuesday or Wednesday. No one called her.

## 2018-11-29 NOTE — TELEPHONE ENCOUNTER
Will forward message on to providers for plan of care. Per LOV 11/13/18 it looks like patient's case was already discussed at conference prior to appointment. Will need to verify plan with providers.

## 2018-11-30 NOTE — TELEPHONE ENCOUNTER
Called patient to update her concerning [facility] conference recommendation    \" angio vs embo discuss with patient preference\"    Also spoke directly with the office, Dr. Sarah Pineda has limited time available in December so we will add on to see patient next w

## 2018-12-05 ENCOUNTER — OFFICE VISIT (OUTPATIENT)
Dept: SURGERY | Facility: CLINIC | Age: 58
End: 2018-12-05
Payer: COMMERCIAL

## 2018-12-05 ENCOUNTER — TELEPHONE (OUTPATIENT)
Dept: SURGERY | Facility: CLINIC | Age: 58
End: 2018-12-05

## 2018-12-05 VITALS — DIASTOLIC BLOOD PRESSURE: 78 MMHG | SYSTOLIC BLOOD PRESSURE: 128 MMHG | HEART RATE: 86 BPM

## 2018-12-05 DIAGNOSIS — I67.1 INTRACRANIAL ANEURYSM: Primary | ICD-10-CM

## 2018-12-05 PROCEDURE — 99213 OFFICE O/P EST LOW 20 MIN: CPT | Performed by: RADIOLOGY

## 2018-12-05 RX ORDER — FERROUS SULFATE 325(65) MG
1 TABLET ORAL 2 TIMES DAILY
COMMUNITY
Start: 2018-11-19 | End: 2020-11-18

## 2018-12-05 RX ORDER — MONTELUKAST SODIUM 10 MG/1
10 TABLET ORAL DAILY
Refills: 2 | COMMUNITY
Start: 2018-11-29 | End: 2019-06-17

## 2018-12-05 RX ORDER — LEVOCETIRIZINE DIHYDROCHLORIDE 5 MG/1
5 TABLET, FILM COATED ORAL EVERY EVENING
Status: ON HOLD | COMMUNITY
End: 2020-02-11

## 2018-12-05 NOTE — PATIENT INSTRUCTIONS
Refill policies:    • Allow 2-3 business days for refills; controlled substances may take longer.   • Contact your pharmacy at least 5 days prior to running out of medication and have them send an electronic request or submit request through the “request re entire amount billed. Precertification and Prior Authorizations: If your physician has recommended that you have a procedure or additional testing performed.   [facility]) will contact your insurance carrier to obtain pre-certi prior to procedure. · You may continue Aspirin, Plavix and nonsteroidal anti-inflammatories up to and including the day of procedure. · Nothing to eat or drink after midnight the night prior to procedure.    · Labs should be done 2 weeks prior to angiogra

## 2018-12-05 NOTE — TELEPHONE ENCOUNTER
Patient scheduled for Cerebral angiogram on 12/10/2018 at 0900    Discussed Angiogram instructions with patient:    · Arrive one hour prior to scheduled procedure start time and get registered at the [place], it is located at the [facility]

## 2018-12-05 NOTE — PROGRESS NOTES
Pt is here for follow up for aneurysm cerebral. Pt states that she is doing well since LOV.        Review of Systems:    Hand Dominance: left  General: fatigue  Neuro: no symptoms reported  Head: dizziness/spinning  Musculoskeletal: no symptoms reported  Ca

## 2018-12-06 RX ORDER — VENLAFAXINE 25 MG/1
25 TABLET ORAL 2 TIMES DAILY
COMMUNITY
End: 2019-02-01

## 2018-12-06 NOTE — TELEPHONE ENCOUNTER
Spoke to Stiven Almanzar at [address], Angiogram procedure (14054, [address]24) is valid and billable, covered at 100% of the allowed amount. Per Stiven Almanzar, no prior authorization, medical policy review or predetermination required or recommended for this service.  Call refer

## 2018-12-10 ENCOUNTER — HOSPITAL ENCOUNTER (OUTPATIENT)
Dept: INTERVENTIONAL RADIOLOGY/VASCULAR | Facility: HOSPITAL | Age: 58
Discharge: HOME | End: 2018-12-10
Attending: RADIOLOGY | Admitting: RADIOLOGY
Payer: COMMERCIAL

## 2018-12-10 VITALS
DIASTOLIC BLOOD PRESSURE: 70 MMHG | TEMPERATURE: 98 F | SYSTOLIC BLOOD PRESSURE: 125 MMHG | OXYGEN SATURATION: 94 % | HEART RATE: 78 BPM | RESPIRATION RATE: 20 BRPM

## 2018-12-10 DIAGNOSIS — I67.1 INTRACRANIAL ANEURYSM: ICD-10-CM

## 2018-12-10 PROCEDURE — 76377 3D RENDER W/INTRP POSTPROCES: CPT

## 2018-12-10 PROCEDURE — 36226 PLACE CATH VERTEBRAL ART: CPT

## 2018-12-10 PROCEDURE — 99152 MOD SED SAME PHYS/QHP 5/>YRS: CPT

## 2018-12-10 PROCEDURE — 76937 US GUIDE VASCULAR ACCESS: CPT

## 2018-12-10 PROCEDURE — 36224 PLACE CATH CAROTD ART: CPT

## 2018-12-10 PROCEDURE — B318YZZ FLUOROSCOPY OF BILATERAL INTERNAL CAROTID ARTERIES USING OTHER CONTRAST: ICD-10-PCS | Performed by: RADIOLOGY

## 2018-12-10 PROCEDURE — B31DYZZ FLUOROSCOPY OF RIGHT VERTEBRAL ARTERY USING OTHER CONTRAST: ICD-10-PCS | Performed by: RADIOLOGY

## 2018-12-10 PROCEDURE — 99153 MOD SED SAME PHYS/QHP EA: CPT

## 2018-12-10 RX ORDER — HEPARIN SODIUM 5000 [USP'U]/ML
INJECTION, SOLUTION INTRAVENOUS; SUBCUTANEOUS
Status: COMPLETED
Start: 2018-12-10 | End: 2018-12-10

## 2018-12-10 RX ORDER — ACETAMINOPHEN 650 MG/1
650 SUPPOSITORY RECTAL EVERY 4 HOURS PRN
Status: DISCONTINUED | OUTPATIENT
Start: 2018-12-10 | End: 2018-12-10

## 2018-12-10 RX ORDER — MORPHINE SULFATE 4 MG/ML
2 INJECTION, SOLUTION INTRAMUSCULAR; INTRAVENOUS EVERY 2 HOUR PRN
Status: DISCONTINUED | OUTPATIENT
Start: 2018-12-10 | End: 2018-12-10

## 2018-12-10 RX ORDER — LIDOCAINE HYDROCHLORIDE 10 MG/ML
INJECTION, SOLUTION INFILTRATION; PERINEURAL
Status: COMPLETED
Start: 2018-12-10 | End: 2018-12-10

## 2018-12-10 RX ORDER — ACETAMINOPHEN 325 MG/1
650 TABLET ORAL EVERY 4 HOURS PRN
Status: DISCONTINUED | OUTPATIENT
Start: 2018-12-10 | End: 2018-12-10

## 2018-12-10 RX ORDER — MORPHINE SULFATE 4 MG/ML
1 INJECTION, SOLUTION INTRAMUSCULAR; INTRAVENOUS EVERY 2 HOUR PRN
Status: DISCONTINUED | OUTPATIENT
Start: 2018-12-10 | End: 2018-12-10

## 2018-12-10 RX ORDER — ONDANSETRON 2 MG/ML
4 INJECTION INTRAMUSCULAR; INTRAVENOUS EVERY 6 HOURS PRN
Status: DISCONTINUED | OUTPATIENT
Start: 2018-12-10 | End: 2018-12-10

## 2018-12-10 RX ORDER — METOCLOPRAMIDE HYDROCHLORIDE 5 MG/ML
10 INJECTION INTRAMUSCULAR; INTRAVENOUS EVERY 8 HOURS PRN
Status: DISCONTINUED | OUTPATIENT
Start: 2018-12-10 | End: 2018-12-10

## 2018-12-10 RX ORDER — MIDAZOLAM HYDROCHLORIDE 1 MG/ML
INJECTION INTRAMUSCULAR; INTRAVENOUS
Status: COMPLETED
Start: 2018-12-10 | End: 2018-12-10

## 2018-12-10 NOTE — PROCEDURES
[facility]  Neurointerventional Surgery Operative Report  Mora Bello Patient Status:  Outpatient in a Bed    10/6/1960 MRN [id number]   Location [address] [facility] Attending Juan Antonio Bhardwaj MD   Hosp Day # 0 PCP Oksana Sandoval To Fahad Galvan, Interventional Neuroradiology / Neurointerventional Surgery     INDICATIONS:   Incidental aneurysm of variant infraoptic left LEEROY similar to [facility] location.      COMPARISON: CTA head    PROCEDURES  PERFORMED:    Micropuncture access and sheath placem noted in the IR flowsheet in [facility] The patient received IV Fentanyl 50 mcg, IV Midazolam 0.5 mg and was monitored using blood pressure, oxygen saturation and cardiac monitors. The duration of sedation was 55 mins.     INFORMED CONSENT: The rationale, bene through a small connection that is washed out by dominant supply from the left LEEROY via an anterior communicating branch. No aneurysm is visualized on this injection.  The right MCA terrirtory is normal. The capillary and venous phases are normal. The right the anterior communicating branch. 2.5mm smooth oval aneurysm projects laterally from the left MCA bifurcation. Mild atherosclerotic plaque at the proximal left LEEROY.     Mild atherosclerotic plaque at the origin of the cervical right ICA    We discuss

## 2018-12-10 NOTE — PROCEDURES
[facility]  Pre-Procedural Note  Mill[place] Lard Patient Status:  Outpatient in a Bed    10/6/1960 MRN [id number]   Location [address] [facility] Attending Peter Boykin MD   Hosp Day # 0 PCP Lennox Veloz DO     Procedure: C

## 2018-12-10 NOTE — PROGRESS NOTES
Pt s/p cerebral angiogram with Dr. Laura Smith. Access via R femoral artery, dressing in place, CDI. Groin soft with no evidence of hematoma/complications. VS stable, pt A&Ox3, neurologically stable.  Discharge instructions given and explained to pt, pt verbaliz

## 2018-12-10 NOTE — H&P
History & Physical Examination    Patient Name: Felicitas Monroe  MRN: [id number]  CSN: [id number]  YOB: 1960    Diagnosis: Left MCA aneurysm and ACOM aneurysm    Present Illness:    This H+P was copied from my office visit with her on 11/13/ Units 12/6/2018   Hemoglobin      12.0 - 16.0 g/dL 12.0   Hematocrit      34.0 - 50.0 % 38.5     Component      Latest Ref Rng & Units 12/6/2018   Platelet Count      515 - 450 10:3/uL 376     Component      Latest Ref Rng & Units 12/6/2018   Sodium      1 hypoglycemia   • Breast Cancer Maternal Aunt         unknown ago    • Breast Cancer Paternal Aunt 38        43;s     Social History    Tobacco Use      Smoking status: Former Smoker        Packs/day: 2.00        Years: 30.00        Pack years: 59        Ty

## 2019-01-04 NOTE — PROGRESS NOTES
She is a 59year old female referred to the interventional neurology service by Dr. Fernando Lujan, her neurologist. Worley Kehr in September 2018 she underwent work up for sinusitis and incidentally identified a possible ACOM aneurysm. Erick Bingham to discuss undergoing co and/or coordination of care related to her intracranial aneurysms.

## 2019-01-14 ENCOUNTER — TELEPHONE (OUTPATIENT)
Dept: SURGERY | Facility: CLINIC | Age: 59
End: 2019-01-14

## 2019-01-14 NOTE — TELEPHONE ENCOUNTER
Per Email received from WINSOME Davidson, CNP  Neuro Interventional Radiology, \"Plan is to monitor the left MCA aneurysm; continue to monitor the ACOMM aneurysm with possible stent-coil in future.  Repeat MRA \"    Per Angiogram on 12/10/2018:  \"Consesn

## 2019-01-16 ENCOUNTER — TELEPHONE (OUTPATIENT)
Dept: SURGERY | Facility: CLINIC | Age: 59
End: 2019-01-16

## 2019-01-16 NOTE — TELEPHONE ENCOUNTER
Pt called back to confirm that she wants to proceed with treatment. Transferred to  to make an appt with Dr. Jeanette Lyon.      Future Appointments   Date Time Provider Asia Garnica   1/18/2019 12:30 PM MD Bernard Melendez [facility]   1/23/

## 2019-01-16 NOTE — TELEPHONE ENCOUNTER
Called and left message for patient.   Informed her that her case was discussed at [facility] and that there was equipoise recommendation in watching vs treating her aneurysm and that it would be up to patient to decide what she felt most comfortable with

## 2019-01-16 NOTE — TELEPHONE ENCOUNTER
Talk to patient about no treatment vs treatment options per Kayla's note. She was curious about what monitoring meant and explained MRA in 6 months and depending on what imaging showed would determine continued monitoring or intervention.      Pt stated s

## 2019-01-23 ENCOUNTER — TELEPHONE (OUTPATIENT)
Dept: SURGERY | Facility: CLINIC | Age: 59
End: 2019-01-23

## 2019-01-23 ENCOUNTER — OFFICE VISIT (OUTPATIENT)
Dept: SURGERY | Facility: CLINIC | Age: 59
End: 2019-01-23
Payer: COMMERCIAL

## 2019-01-23 VITALS — HEART RATE: 80 BPM | SYSTOLIC BLOOD PRESSURE: 130 MMHG | DIASTOLIC BLOOD PRESSURE: 76 MMHG

## 2019-01-23 DIAGNOSIS — I67.1 INTRACRANIAL ANEURYSM: Primary | ICD-10-CM

## 2019-01-23 PROCEDURE — 99214 OFFICE O/P EST MOD 30 MIN: CPT | Performed by: RADIOLOGY

## 2019-01-23 RX ORDER — CLOPIDOGREL BISULFATE 75 MG/1
75 TABLET ORAL DAILY
Qty: 90 TABLET | Refills: 1 | Status: ON HOLD | OUTPATIENT
Start: 2019-01-23 | End: 2019-03-14

## 2019-01-23 NOTE — PROGRESS NOTES
Review of Systems:    Hand Dominance: left  General: no symptoms reported  Neuro: no symptoms reported  Head: no symptoms reported  Musculoskeletal: bone pain and muscle aches  Cardiovascular: no symptoms reported  Gastrointestinal: no symptoms reported  G

## 2019-01-23 NOTE — TELEPHONE ENCOUNTER
Below instructions reviewed after office visit. Also, were sent to patient via zweitgeist.     Patient scheduled for aneurysm coiling on 3/13/19 at 8:00 am with .     Discussed aneurysm coiliing with patient:     · Nothing to eat or drink after midnig

## 2019-01-23 NOTE — PATIENT INSTRUCTIONS
Refill policies:    • Allow 2-3 business days for refills; controlled substances may take longer.   • Contact your pharmacy at least 5 days prior to running out of medication and have them send an electronic request or submit request through the “request re entire amount billed. Precertification and Prior Authorizations: If your physician has recommended that you have a procedure or additional testing performed.   [facility]) will contact your insurance carrier to obtain pre-certi and get registered at the [place] entrance  · Labs to be done 2 weeks prior to coiling, except for P2Y12 and Aspirin platelet inhibition which should be done 2 days prior to  procedure  · Start Plavix and ASA 7 days prior to procedure an

## 2019-01-28 PROCEDURE — 85610 PROTHROMBIN TIME: CPT | Performed by: RADIOLOGY

## 2019-01-28 PROCEDURE — 85025 COMPLETE CBC W/AUTO DIFF WBC: CPT | Performed by: RADIOLOGY

## 2019-01-28 PROCEDURE — 85730 THROMBOPLASTIN TIME PARTIAL: CPT | Performed by: RADIOLOGY

## 2019-01-28 PROCEDURE — 80053 COMPREHEN METABOLIC PANEL: CPT | Performed by: RADIOLOGY

## 2019-02-01 NOTE — PROGRESS NOTES
Patient is here to discuss the results of her recent angiogram. I have already reviewed her case at our multidisciplinary cerebrovascular conference at [facility] and the consensus was to follow the 2.5mm LMCA non invasively and conside

## 2019-02-12 NOTE — TELEPHONE ENCOUNTER
Prior authorization request for inpatient aneurysm coiling initiated with [employer] [place], pending, see referrals.

## 2019-03-01 NOTE — TELEPHONE ENCOUNTER
PCP clearance received from Dr. Maryfrances Halsted 2/28/2019    \"Pt has no significant history of chronic pulmonary conditions. Pt is a good surgical candidate. \"    Nothing further needed.

## 2019-03-11 ENCOUNTER — LAB ENCOUNTER (OUTPATIENT)
Dept: LAB | Facility: HOSPITAL | Age: 59
End: 2019-03-11
Attending: RADIOLOGY
Payer: COMMERCIAL

## 2019-03-11 ENCOUNTER — HOSPITAL ENCOUNTER (OUTPATIENT)
Dept: GENERAL RADIOLOGY | Facility: HOSPITAL | Age: 59
Discharge: HOME | End: 2019-03-11
Attending: INTERNAL MEDICINE
Payer: COMMERCIAL

## 2019-03-11 DIAGNOSIS — Z95.2 AORTIC VALVE REPLACED: ICD-10-CM

## 2019-03-11 DIAGNOSIS — R06.00 DOE (DYSPNEA ON EXERTION): ICD-10-CM

## 2019-03-11 DIAGNOSIS — E78.00 PURE HYPERCHOLESTEROLEMIA: ICD-10-CM

## 2019-03-11 DIAGNOSIS — I67.1 INTRACRANIAL ANEURYSM: ICD-10-CM

## 2019-03-11 DIAGNOSIS — I10 ESSENTIAL HYPERTENSION: ICD-10-CM

## 2019-03-11 LAB
ANTIBODY SCREEN: NEGATIVE
ASPIRIN PLT INHIBITION: 350 ARU (ref 620–672)
P2Y12 REACTION UNITS: 21 PRU
RH BLOOD TYPE: POSITIVE

## 2019-03-11 PROCEDURE — 86901 BLOOD TYPING SEROLOGIC RH(D): CPT

## 2019-03-11 PROCEDURE — 86850 RBC ANTIBODY SCREEN: CPT

## 2019-03-11 PROCEDURE — 86900 BLOOD TYPING SEROLOGIC ABO: CPT

## 2019-03-11 PROCEDURE — 85576 BLOOD PLATELET AGGREGATION: CPT

## 2019-03-11 PROCEDURE — 36415 COLL VENOUS BLD VENIPUNCTURE: CPT

## 2019-03-11 PROCEDURE — 71046 X-RAY EXAM CHEST 2 VIEWS: CPT | Performed by: INTERNAL MEDICINE

## 2019-03-12 ENCOUNTER — ANESTHESIA EVENT (OUTPATIENT)
Dept: INTERVENTIONAL RADIOLOGY/VASCULAR | Facility: HOSPITAL | Age: 59
End: 2019-03-12
Payer: COMMERCIAL

## 2019-03-12 NOTE — ANESTHESIA PREPROCEDURE EVALUATION
PRE-OP EVALUATION    Patient Name: Booker Kim    Pre-op Diagnosis: * No surgery found *    * No surgery found *    * Surgery not found *    Pre-op vitals reviewed. Body mass index is 34.33 kg/m². Current medications reviewed.   [facility] MG Oral Chew Tab Chew 1 tablet (81 mg total) by mouth daily. Disp: 90 tablet Rfl: 2   Vitamin D3 (VITAMIN D3) 2000 UNITS Oral Cap Take 2,000 Units by mouth daily. Disp:  Rfl:    Multiple Vitamin (TAB-A-ALICIA) Oral Tab Take 1 tablet by mouth daily.  Disp:  Rf answered      Drug use: No     Available pre-op labs reviewed.   Lab Results   Component Value Date    WBC 9.7 01/28/2019    RBC 4.32 01/28/2019    HGB 14.2 01/28/2019    HCT 41.6 01/28/2019    MCV 96.3 01/28/2019    MCH 32.9 01/28/2019    MCHC 34.1 01/28/2

## 2019-03-13 ENCOUNTER — ANESTHESIA (OUTPATIENT)
Dept: INTERVENTIONAL RADIOLOGY/VASCULAR | Facility: HOSPITAL | Age: 59
End: 2019-03-13
Payer: COMMERCIAL

## 2019-03-13 ENCOUNTER — HOSPITAL ENCOUNTER (OUTPATIENT)
Dept: INTERVENTIONAL RADIOLOGY/VASCULAR | Facility: HOSPITAL | Age: 59
Discharge: HOME | End: 2019-03-14
Attending: RADIOLOGY | Admitting: RADIOLOGY
Payer: COMMERCIAL

## 2019-03-13 DIAGNOSIS — I67.1 INTRACRANIAL ANEURYSM: ICD-10-CM

## 2019-03-13 PROCEDURE — B317YZZ FLUOROSCOPY OF LEFT INTERNAL CAROTID ARTERY USING OTHER CONTRAST: ICD-10-PCS | Performed by: RADIOLOGY

## 2019-03-13 PROCEDURE — 03LG3DZ OCCLUSION OF INTRACRANIAL ARTERY WITH INTRALUMINAL DEVICE, PERCUTANEOUS APPROACH: ICD-10-PCS | Performed by: RADIOLOGY

## 2019-03-13 PROCEDURE — 99243 OFF/OP CNSLTJ NEW/EST LOW 30: CPT | Performed by: INTERNAL MEDICINE

## 2019-03-13 PROCEDURE — B314YZZ FLUOROSCOPY OF LEFT COMMON CAROTID ARTERY USING OTHER CONTRAST: ICD-10-PCS | Performed by: RADIOLOGY

## 2019-03-13 RX ORDER — ALPRAZOLAM 0.5 MG/1
0.5 TABLET ORAL NIGHTLY PRN
Status: DISCONTINUED | OUTPATIENT
Start: 2019-03-13 | End: 2019-03-14

## 2019-03-13 RX ORDER — FAMOTIDINE 10 MG/ML
20 INJECTION, SOLUTION INTRAVENOUS 2 TIMES DAILY
Status: DISCONTINUED | OUTPATIENT
Start: 2019-03-13 | End: 2019-03-14

## 2019-03-13 RX ORDER — ONDANSETRON 2 MG/ML
4 INJECTION INTRAMUSCULAR; INTRAVENOUS EVERY 6 HOURS PRN
Status: DISCONTINUED | OUTPATIENT
Start: 2019-03-13 | End: 2019-03-14

## 2019-03-13 RX ORDER — METOCLOPRAMIDE HYDROCHLORIDE 5 MG/ML
10 INJECTION INTRAMUSCULAR; INTRAVENOUS EVERY 8 HOURS PRN
Status: DISCONTINUED | OUTPATIENT
Start: 2019-03-13 | End: 2019-03-14

## 2019-03-13 RX ORDER — FAMOTIDINE 20 MG/1
20 TABLET ORAL 2 TIMES DAILY
Status: DISCONTINUED | OUTPATIENT
Start: 2019-03-13 | End: 2019-03-14

## 2019-03-13 RX ORDER — MORPHINE SULFATE 4 MG/ML
INJECTION, SOLUTION INTRAMUSCULAR; INTRAVENOUS
Status: DISPENSED
Start: 2019-03-13 | End: 2019-03-14

## 2019-03-13 RX ORDER — ASPIRIN 81 MG/1
81 TABLET, CHEWABLE ORAL DAILY
Status: DISCONTINUED | OUTPATIENT
Start: 2019-03-14 | End: 2019-03-14

## 2019-03-13 RX ORDER — SODIUM CHLORIDE 9 MG/ML
INJECTION, SOLUTION INTRAVENOUS CONTINUOUS
Status: DISCONTINUED | OUTPATIENT
Start: 2019-03-13 | End: 2019-03-14

## 2019-03-13 RX ORDER — HEPARIN SODIUM 5000 [USP'U]/ML
INJECTION, SOLUTION INTRAVENOUS; SUBCUTANEOUS
Status: COMPLETED
Start: 2019-03-13 | End: 2019-03-13

## 2019-03-13 RX ORDER — LABETALOL HYDROCHLORIDE 5 MG/ML
10 INJECTION, SOLUTION INTRAVENOUS EVERY 10 MIN PRN
Status: DISCONTINUED | OUTPATIENT
Start: 2019-03-13 | End: 2019-03-14

## 2019-03-13 RX ORDER — GABAPENTIN 300 MG/1
300 CAPSULE ORAL 3 TIMES DAILY
Status: DISCONTINUED | OUTPATIENT
Start: 2019-03-13 | End: 2019-03-14

## 2019-03-13 RX ORDER — LIDOCAINE HYDROCHLORIDE 10 MG/ML
INJECTION, SOLUTION INFILTRATION; PERINEURAL
Status: COMPLETED
Start: 2019-03-13 | End: 2019-03-13

## 2019-03-13 RX ORDER — MORPHINE SULFATE 4 MG/ML
2 INJECTION, SOLUTION INTRAMUSCULAR; INTRAVENOUS EVERY 2 HOUR PRN
Status: DISCONTINUED | OUTPATIENT
Start: 2019-03-13 | End: 2019-03-14

## 2019-03-13 RX ORDER — ACETAMINOPHEN 325 MG/1
650 TABLET ORAL EVERY 4 HOURS PRN
Status: DISCONTINUED | OUTPATIENT
Start: 2019-03-13 | End: 2019-03-14

## 2019-03-13 RX ORDER — ACETAMINOPHEN 650 MG/1
650 SUPPOSITORY RECTAL EVERY 4 HOURS PRN
Status: DISCONTINUED | OUTPATIENT
Start: 2019-03-13 | End: 2019-03-14

## 2019-03-13 RX ORDER — VERAPAMIL HYDROCHLORIDE 2.5 MG/ML
INJECTION, SOLUTION INTRAVENOUS
Status: COMPLETED
Start: 2019-03-13 | End: 2019-03-13

## 2019-03-13 RX ORDER — PROTAMINE SULFATE 10 MG/ML
INJECTION, SOLUTION INTRAVENOUS
Status: COMPLETED
Start: 2019-03-13 | End: 2019-03-13

## 2019-03-13 RX ORDER — MORPHINE SULFATE 4 MG/ML
1 INJECTION, SOLUTION INTRAMUSCULAR; INTRAVENOUS EVERY 2 HOUR PRN
Status: DISCONTINUED | OUTPATIENT
Start: 2019-03-13 | End: 2019-03-14

## 2019-03-13 RX ADMIN — GABAPENTIN 300 MG: 300 CAPSULE ORAL at 20:54:00

## 2019-03-13 RX ADMIN — FAMOTIDINE 20 MG: 20 TABLET ORAL at 20:22:00

## 2019-03-13 RX ADMIN — ALPRAZOLAM 0.5 MG: 0.5 TABLET ORAL at 20:54:00

## 2019-03-13 RX ADMIN — MORPHINE SULFATE 2 MG: 4 INJECTION, SOLUTION INTRAMUSCULAR; INTRAVENOUS at 12:41:00

## 2019-03-13 RX ADMIN — SODIUM CHLORIDE 83 ML/HR: 9 INJECTION, SOLUTION INTRAVENOUS at 12:46:00

## 2019-03-13 NOTE — PROCEDURES
[facility]  Neurointerventional Surgery Operative Report  Jeet Johnson Patient Status:  Inpatient    10/6/1960 MRN [id number]   [facility] 6NE-A Attending Asuncion Cummins MD   Hosp Day # 0 PCP Jenny Arora DO     Procedure: ce Neuroradiology / Neurointerventional Surgery     INDICATIONS:   Unruptured left LEEROY ACOM-Pericallosal aneurysm     PROCEDURES  PERFORMED:     Micropuncture access and sheath placement in the right common femoral artery using ultrasound guidance with image their family before the procedure.  They understood that the risks include but are not limited to stroke, intracranial hemorrhage, death, vascular injury, headache, infection, groin or retroperitoneal hematoma, blood product transfusion, contrast allergy or there was a chance that would displace the first coil into the parent vessel.  Therefore I removed the microcatheters with satisfactory final angiography showing coil distributed through the aneurysm with residual interstitial filling and no angiographic ev the head shows partial coil occlusion of the left LEEROY aneurysm and an otherwise unchanged appearance of the intracranial left ICA territory.     Repeat angiography of the left common carotid artery shows an unchanged appearance of the mid cervical left ICA

## 2019-03-13 NOTE — H&P
History & Physical Examination    Patient Name: Beth Saavedra  MRN: [id number]  CSN: [id number]  YOB: 1960    Diagnosis: Cerebral aneurysm    Present Illness:   Gricelda Garcia is a 59year old female with a past medical history of migra Potassium      3.5 - 5.1 mmol/L 3.7   Chloride      98 - 107 mmol/L 110 (H)   Carbon Dioxide, Total      21.0 - 32.0 mmol/L 27.0     Component      Latest Ref Rng & Units 3/13/2019   BUN      7 - 18 mg/dL 13   CREATININE      0.55 - 1.02 mg/dL 0.80 Types: Cigarettes      Smokeless tobacco: Never Used      Tobacco comment: when she smokes-smokes a few a day    Alcohol use:  Yes      Alcohol/week: 0.0 oz      Comment: socially/ cage questions answered      SYSTEM Check if Review is Normal Check if Phy

## 2019-03-13 NOTE — PROCEDURES
[facility]  Pre-Procedural Note  Kimberleealvinacorry Alfredo Patient Status:  Outpatient    10/6/1960 MRN [id number]   [facility]ending Darius Napier MD   [facility] Day # 0 PCP Ziggy Amos DO     Procedure: Cerebral a 24.6

## 2019-03-13 NOTE — PROGRESS NOTES
Received patient from neuro interventional lab at 1230. Patient alert and oriented X 4. Moving all extremities equally. Dressing to right groin clean dry and intact. Site soft with no evidence of hematoma.   Dressing to left groin had moderate amount of

## 2019-03-13 NOTE — PROGRESS NOTES
Post Intervention SOAP note    Subjective:  Patient admitted to CNICU post coiling. Sheath removed in FAN suite. Reported oozing from Left groin s/p shaving. Closure devices placed on right groin. Patient complaining of back pain.     Objective:  HR 87

## 2019-03-13 NOTE — ANESTHESIA POSTPROCEDURE EVALUATION
[address] Patient Status:  Inpatient   Age/Gender 59year old female MRN [id number]   [facility] 6NE-A Attending Francois Cash MD   [address] Day # 0 PCP Tyrese Aguilar DO       Anesthesia Post-op Note    * No procedu

## 2019-03-14 VITALS
BODY MASS INDEX: 34.52 KG/M2 | TEMPERATURE: 97 F | SYSTOLIC BLOOD PRESSURE: 109 MMHG | HEART RATE: 78 BPM | RESPIRATION RATE: 18 BRPM | WEIGHT: 202.19 LBS | OXYGEN SATURATION: 95 % | DIASTOLIC BLOOD PRESSURE: 59 MMHG | HEIGHT: 64 IN

## 2019-03-14 RX ORDER — ASPIRIN 81 MG/1
81 TABLET, CHEWABLE ORAL DAILY
Qty: 3 TABLET | Refills: 0 | Status: SHIPPED | OUTPATIENT
Start: 2019-03-14 | End: 2019-03-17

## 2019-03-14 RX ORDER — ASPIRIN 325 MG
325 TABLET ORAL DAILY
Qty: 30 TABLET | Refills: 1 | Status: SHIPPED | OUTPATIENT
Start: 2019-03-17

## 2019-03-14 RX ADMIN — ASPIRIN 81 MG: 81 TABLET, CHEWABLE ORAL at 08:06:00

## 2019-03-14 RX ADMIN — GABAPENTIN 300 MG: 300 CAPSULE ORAL at 08:06:00

## 2019-03-14 RX ADMIN — FAMOTIDINE 20 MG: 20 TABLET ORAL at 08:06:00

## 2019-03-14 NOTE — PROGRESS NOTES
[facility]  Interventional Neuroradiology Progress Note    Beth Saavedra Patient Status:  Outpatient in a Bed    10/6/1960 MRN [id number]   [facility] 6NE-A Attending Maxwell Adames MD   [facility] Day # 0 PCP DO Monae Meyer intact  Gait: Deferred    Groin: Right groin dressing removed, no drainage, soft, no ecchymosis, non-tender, no mass. Left groin dressing removed no further oozing from site.     Inpt Meds:   • famoTIDine  20 mg Oral BID    Or   • famoTIDine  20 mg Estrella Spoon

## 2019-03-14 NOTE — PLAN OF CARE
Assumed care of patient this a.m. @ 7696. Patient is A/O x3. VSS. Bilateral groins VIRGIE, CDI, sites are soft, no oozing. +3 pedal pulses. Patient denies any pain. Neuro intact. [place] APN with neuro intervention @ bedside.  Patient to go home today after [place]

## 2019-03-14 NOTE — PLAN OF CARE
Received patient. No complaints of pain. Neurologically intact. Vitals stable. Plan to discharge home tomorrow.

## 2019-04-10 ENCOUNTER — OFFICE VISIT (OUTPATIENT)
Dept: SURGERY | Facility: CLINIC | Age: 59
End: 2019-04-10
Payer: COMMERCIAL

## 2019-04-10 VITALS — DIASTOLIC BLOOD PRESSURE: 80 MMHG | SYSTOLIC BLOOD PRESSURE: 138 MMHG | HEART RATE: 82 BPM

## 2019-04-10 DIAGNOSIS — I67.1 CEREBRAL ANEURYSM WITHOUT RUPTURE: Primary | ICD-10-CM

## 2019-04-10 PROCEDURE — 99213 OFFICE O/P EST LOW 20 MIN: CPT | Performed by: NURSE PRACTITIONER

## 2019-04-10 NOTE — PROGRESS NOTES
[facility]  Interventional Neuroradiology Progress Note    Edgardo Hollis     10/6/1960 MRN [id number]   Location [facility], [address], [address] PCP Tom Sees, DO       We had the pleasure of seeing alyssa Skinner 6 paresthesias. The patient denies dizziness, imbalance, falls, difficulty with ambulation, coordination, dysarthria, or speech expression/comprehension.      The patient denies constitutional symptoms, such as fevers, chills, night sweats, weight loss, ches •  gabapentin 300 MG Oral Cap, Take 1 capsule (300 mg total) by mouth 3 (three) times daily. , Disp: 90 capsule, Rfl: 11  •  POTASSIUM CHLORIDE ER 10 MEQ Oral Tab CR, TAKE 1 TABLET BY MOUTH ONE TIME A DAY , Disp: 90 tablet, Rfl: 3  •  TORSEMIDE 10 MG Oral

## 2019-04-10 NOTE — PROGRESS NOTES
Pt is here for follow up post op coiling. Pt states that she has been doing well since post coiling.      Review of Systems:    Hand Dominance: left  General: no symptoms reported  Neuro: no symptoms reported  Head: headache and dizziness/spinning  Musculos

## 2019-04-10 NOTE — PATIENT INSTRUCTIONS
Call central scheduling to schedule follow up MRA [phone]. Continue taking Aspirin 325 mg daily. Call the office if the headaches worsen. Ok to continue Tylenol as needed.   Refill policies:    • Allow 2-3 business days for refills; controlled sub FRIDAY    Scheduling Tests: If your physician has ordered radiology tests such as MRI or CT scans, do not schedule the test until this office has notified you that the test has been approved by your insurer.   Depending on your insurance carrier, approva  for this paper. • Failure to follow above steps may result in the delay of form completion.   •

## 2019-05-06 ENCOUNTER — HOSPITAL ENCOUNTER (OUTPATIENT)
Dept: MRI IMAGING | Age: 59
Discharge: HOME | End: 2019-05-06
Attending: NURSE PRACTITIONER
Payer: COMMERCIAL

## 2019-05-06 DIAGNOSIS — I67.1 INTRACRANIAL ANEURYSM: ICD-10-CM

## 2019-05-06 PROCEDURE — 70544 MR ANGIOGRAPHY HEAD W/O DYE: CPT | Performed by: NURSE PRACTITIONER

## 2019-05-13 ENCOUNTER — TELEPHONE (OUTPATIENT)
Dept: SURGERY | Facility: CLINIC | Age: 59
End: 2019-05-13

## 2019-05-13 DIAGNOSIS — I67.1 CEREBRAL ANEURYSM WITHOUT RUPTURE: Primary | ICD-10-CM

## 2019-05-13 NOTE — TELEPHONE ENCOUNTER
Per WINSOME Felipe at [facility] 4/10/19: \"I have instructed Mrs. Priya Mack to continue taking her Aspirin 325 mg daily.     I have included in her instructions the number to call central schedule to complete follow up MRA brain imaging.   Once imaging has been

## 2019-05-14 ENCOUNTER — TELEPHONE (OUTPATIENT)
Dept: SURGERY | Facility: CLINIC | Age: 59
End: 2019-05-14

## 2019-05-14 NOTE — TELEPHONE ENCOUNTER
Patient reminder placed for October     Discussed MRA results with patient     We will plan to repeat MRA in October and have patient return to clinic once imaging has been completed.   Order has been placed for imaging      Continue  mg

## 2019-05-14 NOTE — TELEPHONE ENCOUNTER
Discussed MRA results with patient    We will plan to repeat MRA in October and have patient return to clinic once imaging has been completed.   Order has been placed for imaging     Continue  mg

## 2019-08-23 ENCOUNTER — APPOINTMENT (OUTPATIENT)
Dept: CT IMAGING | Facility: HOSPITAL | Age: 59
End: 2019-08-23
Attending: EMERGENCY MEDICINE
Payer: COMMERCIAL

## 2019-08-23 ENCOUNTER — HOSPITAL ENCOUNTER (OUTPATIENT)
Age: 59
Discharge: ANOTHER HOSPITAL | End: 2019-08-23
Attending: FAMILY MEDICINE
Payer: COMMERCIAL

## 2019-08-23 ENCOUNTER — APPOINTMENT (OUTPATIENT)
Dept: GENERAL RADIOLOGY | Age: 59
End: 2019-08-23
Attending: PHYSICIAN ASSISTANT
Payer: COMMERCIAL

## 2019-08-23 ENCOUNTER — HOSPITAL ENCOUNTER (OUTPATIENT)
Facility: HOSPITAL | Age: 59
Setting detail: OBSERVATION
Discharge: HOME | End: 2019-08-24
Attending: EMERGENCY MEDICINE | Admitting: INTERNAL MEDICINE
Payer: COMMERCIAL

## 2019-08-23 VITALS
TEMPERATURE: 99 F | DIASTOLIC BLOOD PRESSURE: 57 MMHG | HEART RATE: 82 BPM | RESPIRATION RATE: 21 BRPM | OXYGEN SATURATION: 92 % | SYSTOLIC BLOOD PRESSURE: 95 MMHG

## 2019-08-23 DIAGNOSIS — R09.02 HYPOXIA: Primary | ICD-10-CM

## 2019-08-23 DIAGNOSIS — J20.9 ACUTE BRONCHITIS, UNSPECIFIED ORGANISM: ICD-10-CM

## 2019-08-23 DIAGNOSIS — R09.02 HYPOXIA: ICD-10-CM

## 2019-08-23 DIAGNOSIS — J98.01 ACUTE BRONCHOSPASM: Primary | ICD-10-CM

## 2019-08-23 PROBLEM — E87.3 METABOLIC ALKALOSIS: Status: ACTIVE | Noted: 2019-08-23

## 2019-08-23 PROBLEM — E87.29 RESPIRATORY ACIDOSIS: Status: ACTIVE | Noted: 2019-08-23

## 2019-08-23 PROBLEM — R73.9 HYPERGLYCEMIA: Status: ACTIVE | Noted: 2019-08-23

## 2019-08-23 PROBLEM — E87.2 RESPIRATORY ACIDOSIS: Status: ACTIVE | Noted: 2019-08-23

## 2019-08-23 PROBLEM — D72.829 LEUKOCYTOSIS: Status: ACTIVE | Noted: 2019-08-23

## 2019-08-23 LAB
#MXD IC: 0.9 X10ˆ3/UL (ref 0.1–1)
ADENOVIRUS PCR:: NEGATIVE
ALBUMIN SERPL-MCNC: 3.3 G/DL (ref 3.4–5)
ALBUMIN/GLOB SERPL: 0.8 {RATIO} (ref 1–2)
ALLENS TEST: POSITIVE
ALP LIVER SERPL-CCNC: 99 U/L (ref 46–118)
ANION GAP SERPL CALC-SCNC: 4 MMOL/L (ref 0–18)
ARTERIAL BLD GAS O2 SATURATION: 94 % (ref 92–100)
ARTERIAL BLOOD GAS BASE EXCESS: -0.9 MMOL/L (ref ?–2)
ARTERIAL BLOOD GAS HCO3: 24.9 MEQ/L (ref 22–26)
ARTERIAL BLOOD GAS PCO2: 46 MM HG (ref 35–45)
ARTERIAL BLOOD GAS PH: 7.36 (ref 7.35–7.45)
ARTERIAL BLOOD GAS PO2: 99 MM HG (ref 80–105)
ATRIAL RATE: 79 BPM
ATRIAL RATE: 80 BPM
B PERT DNA SPEC QL NAA+PROBE: NEGATIVE
BASOPHILS # BLD AUTO: 0.05 X10(3) UL (ref 0–0.2)
BASOPHILS NFR BLD AUTO: 0.3 %
BILIRUB SERPL-MCNC: 0.7 MG/DL (ref 0.1–2)
BUN BLD-MCNC: 8 MG/DL (ref 7–18)
BUN/CREAT SERPL: 12.5 (ref 10–20)
C PNEUM DNA SPEC QL NAA+PROBE: NEGATIVE
CALCIUM BLD-MCNC: 8.5 MG/DL (ref 8.5–10.1)
CALCULATED O2 SATURATION: 97 % (ref 92–100)
CARBOXYHEMOGLOBIN: 2.9 % SAT (ref 0–3)
CHLORIDE SERPL-SCNC: 108 MMOL/L (ref 98–112)
CO2 SERPL-SCNC: 25 MMOL/L (ref 21–32)
CORONAVIRUS 229E PCR:: NEGATIVE
CORONAVIRUS HKU1 PCR:: NEGATIVE
CORONAVIRUS NL63 PCR:: NEGATIVE
CORONAVIRUS OC43 PCR:: NEGATIVE
CREAT BLD-MCNC: 0.64 MG/DL (ref 0.55–1.02)
CREAT BLD-MCNC: 0.7 MG/DL (ref 0.55–1.02)
D-DIMER: 0.6 UG/ML FEU (ref ?–0.6)
DDIMER WHOLE BLOOD: 216 NG/ML DDU (ref ?–400)
DEPRECATED RDW RBC AUTO: 47.1 FL (ref 35.1–46.3)
EOSINOPHIL # BLD AUTO: 0.29 X10(3) UL (ref 0–0.7)
EOSINOPHIL NFR BLD AUTO: 2 %
ERYTHROCYTE [DISTWIDTH] IN BLOOD BY AUTOMATED COUNT: 13.3 % (ref 11–15)
FLUAV RNA SPEC QL NAA+PROBE: NEGATIVE
FLUBV RNA SPEC QL NAA+PROBE: NEGATIVE
GLOBULIN PLAS-MCNC: 4.2 G/DL (ref 2.8–4.4)
GLUCOSE BLD-MCNC: 110 MG/DL (ref 70–99)
GLUCOSE BLD-MCNC: 119 MG/DL (ref 70–99)
HCT VFR BLD AUTO: 35.1 % (ref 35–48)
HCT VFR BLD AUTO: 37.3 % (ref 35–48)
HGB BLD-MCNC: 11.8 G/DL (ref 12–16)
HGB BLD-MCNC: 12.4 G/DL (ref 12–16)
IMM GRANULOCYTES # BLD AUTO: 0.08 X10(3) UL (ref 0–1)
IMM GRANULOCYTES NFR BLD: 0.5 %
ISTAT BUN: 9 MG/DL (ref 8–20)
ISTAT CHLORIDE: 102 MMOL/L (ref 101–111)
ISTAT HEMATOCRIT: 35 % (ref 34–50)
ISTAT IONIZED CALCIUM FOR CHEM 8: 1.15 MMOL/L (ref 1.12–1.32)
ISTAT POTASSIUM: 4.4 MMOL/L (ref 3.6–5.1)
ISTAT SODIUM: 138 MMOL/L (ref 136–145)
ISTAT TROPONIN: <0.1 NG/ML (ref ?–0.1)
L/M: 8 L/MIN
LYMPHOCYTES # BLD AUTO: 1.2 X10ˆ3/UL (ref 1–4)
LYMPHOCYTES # BLD AUTO: 1.34 X10(3) UL (ref 1–4)
LYMPHOCYTES NFR BLD AUTO: 8.9 %
LYMPHOCYTES NFR BLD AUTO: 9.2 %
M PROTEIN MFR SERPL ELPH: 7.5 G/DL (ref 6.4–8.2)
MCH RBC QN AUTO: 32.3 PG (ref 26–34)
MCH RBC QN AUTO: 32.7 PG (ref 26–34)
MCHC RBC AUTO-ENTMCNC: 33.2 G/DL (ref 31–37)
MCHC RBC AUTO-ENTMCNC: 33.6 G/DL (ref 31–37)
MCV RBC AUTO: 96.2 FL (ref 80–100)
MCV RBC AUTO: 98.4 FL (ref 80–100)
METAPNEUMOVIRUS PCR:: NEGATIVE
METHEMOGLOBIN: 0.6 % SAT (ref 0.4–1.5)
MIXED CELL %: 6.8 %
MONOCYTES # BLD AUTO: 0.71 X10(3) UL (ref 0.1–1)
MONOCYTES NFR BLD AUTO: 4.9 %
MYCOPLASMA PNEUMONIA PCR:: NEGATIVE
NEUTROPHILS # BLD AUTO: 11.5 X10ˆ3/UL (ref 1.5–7.7)
NEUTROPHILS # BLD AUTO: 12.09 X10 (3) UL (ref 1.5–7.7)
NEUTROPHILS # BLD AUTO: 12.09 X10(3) UL (ref 1.5–7.7)
NEUTROPHILS NFR BLD AUTO: 83.1 %
NEUTROPHILS NFR BLD AUTO: 84.3 %
OSMOLALITY SERPL CALC.SUM OF ELEC: 283 MOSM/KG (ref 275–295)
P AXIS: 61 DEGREES
P AXIS: 68 DEGREES
P-R INTERVAL: 142 MS
P-R INTERVAL: 142 MS
P/F RATIO: 479.8 MMHG
PARAINFLUENZA 1 PCR:: NEGATIVE
PARAINFLUENZA 2 PCR:: NEGATIVE
PARAINFLUENZA 3 PCR:: NEGATIVE
PARAINFLUENZA 4 PCR:: NEGATIVE
PATIENT TEMPERATURE: 98.1 F
PLATELET # BLD AUTO: 259 10(3)UL (ref 150–450)
PLATELET # BLD AUTO: 260 X10ˆ3/UL (ref 150–450)
PROCALCITONIN SERPL-MCNC: 0.05 NG/ML
Q-T INTERVAL: 424 MS
Q-T INTERVAL: 424 MS
QRS DURATION: 92 MS
QRS DURATION: 98 MS
QTC CALCULATION (BEZET): 486 MS
QTC CALCULATION (BEZET): 489 MS
R AXIS: 45 DEGREES
R AXIS: 53 DEGREES
RBC # BLD AUTO: 3.65 X10ˆ6/UL (ref 3.8–5.3)
RBC # BLD AUTO: 3.79 X10(6)UL (ref 3.8–5.3)
RHINOVIRUS/ENTERO PCR:: POSITIVE
RSV RNA SPEC QL NAA+PROBE: NEGATIVE
SODIUM SERPL-SCNC: 137 MMOL/L (ref 136–145)
T AXIS: 95 DEGREES
T AXIS: 98 DEGREES
TOTAL HEMOGLOBIN: 11.8 G/DL (ref 11.7–16)
TROPONIN I SERPL-MCNC: <0.045 NG/ML (ref ?–0.04)
VENTRICULAR RATE: 79 BPM
VENTRICULAR RATE: 80 BPM
WBC # BLD AUTO: 13.6 X10ˆ3/UL (ref 4–11)
WBC # BLD AUTO: 14.6 X10(3) UL (ref 4–11)

## 2019-08-23 PROCEDURE — 94644 CONT INHLJ TX 1ST HOUR: CPT

## 2019-08-23 PROCEDURE — 93010 ELECTROCARDIOGRAM REPORT: CPT

## 2019-08-23 PROCEDURE — 36415 COLL VENOUS BLD VENIPUNCTURE: CPT

## 2019-08-23 PROCEDURE — 71045 X-RAY EXAM CHEST 1 VIEW: CPT | Performed by: PHYSICIAN ASSISTANT

## 2019-08-23 PROCEDURE — 94645 CONT INHLJ TX EACH ADDL HOUR: CPT

## 2019-08-23 PROCEDURE — 85379 FIBRIN DEGRADATION QUANT: CPT | Performed by: EMERGENCY MEDICINE

## 2019-08-23 PROCEDURE — 96375 TX/PRO/DX INJ NEW DRUG ADDON: CPT

## 2019-08-23 PROCEDURE — 82375 ASSAY CARBOXYHB QUANT: CPT | Performed by: EMERGENCY MEDICINE

## 2019-08-23 PROCEDURE — 85025 COMPLETE CBC W/AUTO DIFF WBC: CPT | Performed by: FAMILY MEDICINE

## 2019-08-23 PROCEDURE — 80047 BASIC METABLC PNL IONIZED CA: CPT

## 2019-08-23 PROCEDURE — 93005 ELECTROCARDIOGRAM TRACING: CPT

## 2019-08-23 PROCEDURE — 87798 DETECT AGENT NOS DNA AMP: CPT | Performed by: INTERNAL MEDICINE

## 2019-08-23 PROCEDURE — 71275 CT ANGIOGRAPHY CHEST: CPT | Performed by: EMERGENCY MEDICINE

## 2019-08-23 PROCEDURE — 84145 PROCALCITONIN (PCT): CPT | Performed by: INTERNAL MEDICINE

## 2019-08-23 PROCEDURE — 85018 HEMOGLOBIN: CPT | Performed by: EMERGENCY MEDICINE

## 2019-08-23 PROCEDURE — 99285 EMERGENCY DEPT VISIT HI MDM: CPT

## 2019-08-23 PROCEDURE — 96374 THER/PROPH/DIAG INJ IV PUSH: CPT

## 2019-08-23 PROCEDURE — 99215 OFFICE O/P EST HI 40 MIN: CPT

## 2019-08-23 PROCEDURE — 87486 CHLMYD PNEUM DNA AMP PROBE: CPT | Performed by: INTERNAL MEDICINE

## 2019-08-23 PROCEDURE — 96365 THER/PROPH/DIAG IV INF INIT: CPT

## 2019-08-23 PROCEDURE — 96361 HYDRATE IV INFUSION ADD-ON: CPT

## 2019-08-23 PROCEDURE — 80053 COMPREHEN METABOLIC PANEL: CPT | Performed by: EMERGENCY MEDICINE

## 2019-08-23 PROCEDURE — 84484 ASSAY OF TROPONIN QUANT: CPT

## 2019-08-23 PROCEDURE — 99205 OFFICE O/P NEW HI 60 MIN: CPT

## 2019-08-23 PROCEDURE — 94640 AIRWAY INHALATION TREATMENT: CPT

## 2019-08-23 PROCEDURE — 87633 RESP VIRUS 12-25 TARGETS: CPT | Performed by: INTERNAL MEDICINE

## 2019-08-23 PROCEDURE — 87999 UNLISTED MICROBIOLOGY PX: CPT

## 2019-08-23 PROCEDURE — 96366 THER/PROPH/DIAG IV INF ADDON: CPT

## 2019-08-23 PROCEDURE — 84484 ASSAY OF TROPONIN QUANT: CPT | Performed by: EMERGENCY MEDICINE

## 2019-08-23 PROCEDURE — 96368 THER/DIAG CONCURRENT INF: CPT

## 2019-08-23 PROCEDURE — 83050 HGB METHEMOGLOBIN QUAN: CPT | Performed by: EMERGENCY MEDICINE

## 2019-08-23 PROCEDURE — 85378 FIBRIN DEGRADE SEMIQUANT: CPT | Performed by: FAMILY MEDICINE

## 2019-08-23 PROCEDURE — 87040 BLOOD CULTURE FOR BACTERIA: CPT | Performed by: EMERGENCY MEDICINE

## 2019-08-23 PROCEDURE — 36600 WITHDRAWAL OF ARTERIAL BLOOD: CPT | Performed by: EMERGENCY MEDICINE

## 2019-08-23 PROCEDURE — 85025 COMPLETE CBC W/AUTO DIFF WBC: CPT | Performed by: EMERGENCY MEDICINE

## 2019-08-23 PROCEDURE — 82803 BLOOD GASES ANY COMBINATION: CPT | Performed by: EMERGENCY MEDICINE

## 2019-08-23 PROCEDURE — 87581 M.PNEUMON DNA AMP PROBE: CPT | Performed by: INTERNAL MEDICINE

## 2019-08-23 PROCEDURE — 96372 THER/PROPH/DIAG INJ SC/IM: CPT

## 2019-08-23 RX ORDER — PREDNISONE 20 MG/1
40 TABLET ORAL ONCE
Status: COMPLETED | OUTPATIENT
Start: 2019-08-23 | End: 2019-08-23

## 2019-08-23 RX ORDER — MONTELUKAST SODIUM 10 MG/1
10 TABLET ORAL DAILY
COMMUNITY
End: 2019-09-17

## 2019-08-23 RX ORDER — HYDROCODONE BITARTRATE AND ACETAMINOPHEN 10; 325 MG/1; MG/1
1 TABLET ORAL EVERY 8 HOURS PRN
Status: DISCONTINUED | OUTPATIENT
Start: 2019-08-23 | End: 2019-08-24

## 2019-08-23 RX ORDER — ONDANSETRON 2 MG/ML
4 INJECTION INTRAMUSCULAR; INTRAVENOUS EVERY 4 HOURS PRN
Status: DISCONTINUED | OUTPATIENT
Start: 2019-08-23 | End: 2019-08-24

## 2019-08-23 RX ORDER — SODIUM CHLORIDE 9 MG/ML
INJECTION, SOLUTION INTRAVENOUS CONTINUOUS
Status: DISCONTINUED | OUTPATIENT
Start: 2019-08-23 | End: 2019-08-24

## 2019-08-23 RX ORDER — METHYLPREDNISOLONE SODIUM SUCCINATE 125 MG/2ML
125 INJECTION, POWDER, LYOPHILIZED, FOR SOLUTION INTRAMUSCULAR; INTRAVENOUS ONCE
Status: COMPLETED | OUTPATIENT
Start: 2019-08-23 | End: 2019-08-23

## 2019-08-23 RX ORDER — MONTELUKAST SODIUM 10 MG/1
10 TABLET ORAL DAILY
Status: DISCONTINUED | OUTPATIENT
Start: 2019-08-23 | End: 2019-08-24

## 2019-08-23 RX ORDER — ASPIRIN 325 MG
325 TABLET ORAL DAILY
Status: DISCONTINUED | OUTPATIENT
Start: 2019-08-23 | End: 2019-08-24

## 2019-08-23 RX ORDER — METOPROLOL SUCCINATE 25 MG/1
25 TABLET, EXTENDED RELEASE ORAL 2 TIMES DAILY
COMMUNITY
End: 2020-07-22

## 2019-08-23 RX ORDER — METOPROLOL SUCCINATE 25 MG/1
25 TABLET, EXTENDED RELEASE ORAL
Status: DISCONTINUED | OUTPATIENT
Start: 2019-08-23 | End: 2019-08-24

## 2019-08-23 RX ORDER — GARLIC EXTRACT 500 MG
1 CAPSULE ORAL DAILY
Status: DISCONTINUED | OUTPATIENT
Start: 2019-08-23 | End: 2019-08-24

## 2019-08-23 RX ORDER — MELATONIN
325 2 TIMES DAILY
Status: DISCONTINUED | OUTPATIENT
Start: 2019-08-23 | End: 2019-08-24

## 2019-08-23 RX ORDER — ALPRAZOLAM 0.5 MG/1
0.5 TABLET ORAL NIGHTLY PRN
Status: DISCONTINUED | OUTPATIENT
Start: 2019-08-23 | End: 2019-08-24

## 2019-08-23 RX ORDER — SODIUM CHLORIDE 9 MG/ML
INJECTION, SOLUTION INTRAVENOUS CONTINUOUS
Status: ACTIVE | OUTPATIENT
Start: 2019-08-23 | End: 2019-08-23

## 2019-08-23 RX ORDER — VENLAFAXINE HYDROCHLORIDE 75 MG/1
75 CAPSULE, EXTENDED RELEASE ORAL 2 TIMES DAILY
Status: DISCONTINUED | OUTPATIENT
Start: 2019-08-23 | End: 2019-08-24

## 2019-08-23 RX ORDER — IPRATROPIUM BROMIDE AND ALBUTEROL SULFATE 2.5; .5 MG/3ML; MG/3ML
3 SOLUTION RESPIRATORY (INHALATION) EVERY 4 HOURS PRN
Status: DISCONTINUED | OUTPATIENT
Start: 2019-08-23 | End: 2019-08-24

## 2019-08-23 RX ORDER — ENOXAPARIN SODIUM 100 MG/ML
40 INJECTION SUBCUTANEOUS DAILY
Status: DISCONTINUED | OUTPATIENT
Start: 2019-08-23 | End: 2019-08-24

## 2019-08-23 RX ORDER — ATORVASTATIN CALCIUM 80 MG/1
80 TABLET, FILM COATED ORAL NIGHTLY
COMMUNITY
End: 2019-10-16

## 2019-08-23 RX ORDER — IPRATROPIUM BROMIDE AND ALBUTEROL SULFATE 2.5; .5 MG/3ML; MG/3ML
3 SOLUTION RESPIRATORY (INHALATION) ONCE
Status: COMPLETED | OUTPATIENT
Start: 2019-08-23 | End: 2019-08-23

## 2019-08-23 RX ORDER — VENLAFAXINE HYDROCHLORIDE 75 MG/1
75 CAPSULE, EXTENDED RELEASE ORAL 2 TIMES DAILY
COMMUNITY
End: 2020-05-15

## 2019-08-23 RX ORDER — ACETAMINOPHEN 325 MG/1
650 TABLET ORAL EVERY 6 HOURS PRN
Status: DISCONTINUED | OUTPATIENT
Start: 2019-08-23 | End: 2019-08-24

## 2019-08-23 RX ORDER — GABAPENTIN 300 MG/1
300 CAPSULE ORAL 3 TIMES DAILY
Status: DISCONTINUED | OUTPATIENT
Start: 2019-08-23 | End: 2019-08-24

## 2019-08-23 RX ORDER — GARLIC EXTRACT 500 MG
1 CAPSULE ORAL DAILY
COMMUNITY

## 2019-08-23 RX ORDER — ATORVASTATIN CALCIUM 80 MG/1
80 TABLET, FILM COATED ORAL NIGHTLY
Status: DISCONTINUED | OUTPATIENT
Start: 2019-08-23 | End: 2019-08-24

## 2019-08-23 RX ORDER — ALPRAZOLAM 0.5 MG/1
0.5 TABLET ORAL NIGHTLY PRN
COMMUNITY
End: 2019-09-12

## 2019-08-23 RX ORDER — CETIRIZINE HYDROCHLORIDE 10 MG/1
10 TABLET ORAL DAILY
Status: DISCONTINUED | OUTPATIENT
Start: 2019-08-23 | End: 2019-08-24

## 2019-08-23 RX ORDER — CODEINE PHOSPHATE AND GUAIFENESIN 10; 100 MG/5ML; MG/5ML
10 SOLUTION ORAL EVERY 6 HOURS PRN
Status: DISCONTINUED | OUTPATIENT
Start: 2019-08-23 | End: 2019-08-24

## 2019-08-23 RX ORDER — DOXYCYCLINE HYCLATE 100 MG/1
100 CAPSULE ORAL EVERY 12 HOURS SCHEDULED
Status: DISCONTINUED | OUTPATIENT
Start: 2019-08-24 | End: 2019-08-24

## 2019-08-23 NOTE — ED INITIAL ASSESSMENT (HPI)
Patient here by EMS from [facility]. Patient with increasing dyspnea for 2 days. Sao2 was 85%. Patient had 2 nebs and oral prednisone. She was intubated last year after AVR and developed ARDS.

## 2019-08-23 NOTE — ED PROVIDER NOTES
Patient Seen in: [facility]    History   Patient presents with:  Cough  Shortness Of Breath  Wheezing    Stated Complaint: cough,chest pressure,shortness of breath    HPI    58-year-old female history of hypertension, hyperlipid Smoking status: Former Smoker        Packs/day: 2.00        Years: 30.00        Pack years: 60        Types: Cigarettes      Smokeless tobacco: Never Used      Tobacco comment: when she smokes-smokes a few a day    Alcohol use:  Yes      Alcohol/week: 0.0 s POCT CBC - Abnormal; Notable for the following components:       Result Value    WBC IC 13.6 (*)     RBC IC 3.65 (*)     HGB IC 11.8 (*)     # Neutrophil 11.5 (*)     All other components within normal limits   POCT ISTAT CHEM8 CARTRIDGE - Abnormal; Nota cell count. Chest x-ray was unremarkable. Although patient has improved, unable to maintain her saturation above 92% consistently. Decision was made to transfer the patient to the main ED for further evaluation and treatment.   Patient will be transporte

## 2019-08-23 NOTE — ED INITIAL ASSESSMENT (HPI)
Cough- since Wednesday. Cough dry. Denies fever, pt took nyquil.  Pt states she has an ongoing cough that started feb 2018, after open heart surgery she states she was intubated 3 x after that  From ARDS   And the last was from brain aneurysm march 2019  Sh

## 2019-08-23 NOTE — ED NOTES
Vitals rechecked. Pt states she feels much better, pt states that she is able to take deeper breaths after 2nd neb. Dr Rosemarie Galloway aware.

## 2019-08-23 NOTE — PLAN OF CARE
Admission navigator completed. A/Ox4. On 4L  and . Baseline is ra will try to wean. Nebs prn. IV abx as ordered. Up SB to bathroom, some sob noted. IS at bedside. Sputum still to be collected.  at bedside. Taught to use call light button.  Will co

## 2019-08-23 NOTE — ED NOTES
Pt wheeled to room 3. Pt alert x 3 , labored breathing, accompanied by spouse, tp here for cough and shortness of breath with chest pressure which started Wednesday.  Pt has been intubated 3 x after opennheart surgery in fec 2018, then after ARDS post heart

## 2019-08-23 NOTE — ED PROVIDER NOTES
Patient Seen in: [facility] Emergency Department    History   Patient presents with:  Dyspnea FAITH SOB (respiratory)    Stated Complaint: From BICC, sao2 80    HPI    This is a 59-year-old female who arrives for EMS from [facility] immediate care. (36.8 °C)   Temp src Temporal   SpO2 (!) 88 %   O2 Device None (Room air)       Current:/66   Pulse 99   Temp 98.3 °F (36.8 °C) (Temporal)   Resp 20   Ht 162.6 cm (5' 4\")   Wt 94.3 kg   SpO2 96%   BMI 35.68 kg/m²         Physical Exam  General: .   P other components within normal limits   TROPONIN I - Normal   CBC WITH DIFFERENTIAL WITH PLATELET    Narrative: The following orders were created for panel order CBC WITH DIFFERENTIAL WITH PLATELET.   Procedure                               Abnormality opacities within bilateral lower lobes as well as to a lesser degree within the right middle lobe and lingula which are of uncertain significance.   Incidental small calcified granuloma noted within the right middle lobe and left upper lobe, each measuring Dave Alvarez MD              The patient's comprehensive was within normal limits arterial blood gas shows no findings of acidosis.     CBC shows a white count that was elevated at 14.6 originally the patient had gotten continuous nebs I reexamined her she was fe

## 2019-08-24 VITALS
DIASTOLIC BLOOD PRESSURE: 70 MMHG | OXYGEN SATURATION: 91 % | HEIGHT: 64 IN | RESPIRATION RATE: 20 BRPM | BODY MASS INDEX: 35.49 KG/M2 | HEART RATE: 78 BPM | SYSTOLIC BLOOD PRESSURE: 132 MMHG | TEMPERATURE: 98 F | WEIGHT: 207.88 LBS

## 2019-08-24 LAB
ANION GAP SERPL CALC-SCNC: 5 MMOL/L (ref 0–18)
BASOPHILS # BLD AUTO: 0.02 X10(3) UL (ref 0–0.2)
BASOPHILS NFR BLD AUTO: 0.1 %
BUN BLD-MCNC: 14 MG/DL (ref 7–18)
BUN/CREAT SERPL: 21.9 (ref 10–20)
CALCIUM BLD-MCNC: 8.6 MG/DL (ref 8.5–10.1)
CHLORIDE SERPL-SCNC: 111 MMOL/L (ref 98–112)
CO2 SERPL-SCNC: 28 MMOL/L (ref 21–32)
CREAT BLD-MCNC: 0.64 MG/DL (ref 0.55–1.02)
DEPRECATED RDW RBC AUTO: 48.2 FL (ref 35.1–46.3)
EOSINOPHIL # BLD AUTO: 0 X10(3) UL (ref 0–0.7)
EOSINOPHIL NFR BLD AUTO: 0 %
ERYTHROCYTE [DISTWIDTH] IN BLOOD BY AUTOMATED COUNT: 13.2 % (ref 11–15)
GLUCOSE BLD-MCNC: 144 MG/DL (ref 70–99)
HCT VFR BLD AUTO: 36.4 % (ref 35–48)
HGB BLD-MCNC: 11.6 G/DL (ref 12–16)
IMM GRANULOCYTES # BLD AUTO: 0.19 X10(3) UL (ref 0–1)
IMM GRANULOCYTES NFR BLD: 1.1 %
LYMPHOCYTES # BLD AUTO: 1.27 X10(3) UL (ref 1–4)
LYMPHOCYTES NFR BLD AUTO: 7.3 %
MCH RBC QN AUTO: 31.7 PG (ref 26–34)
MCHC RBC AUTO-ENTMCNC: 31.9 G/DL (ref 31–37)
MCV RBC AUTO: 99.5 FL (ref 80–100)
MONOCYTES # BLD AUTO: 0.96 X10(3) UL (ref 0.1–1)
MONOCYTES NFR BLD AUTO: 5.5 %
NEUTROPHILS # BLD AUTO: 14.87 X10 (3) UL (ref 1.5–7.7)
NEUTROPHILS # BLD AUTO: 14.87 X10(3) UL (ref 1.5–7.7)
NEUTROPHILS NFR BLD AUTO: 86 %
OSMOLALITY SERPL CALC.SUM OF ELEC: 301 MOSM/KG (ref 275–295)
PLATELET # BLD AUTO: 289 10(3)UL (ref 150–450)
POTASSIUM SERPL-SCNC: 4.1 MMOL/L (ref 3.5–5.1)
PROCALCITONIN SERPL-MCNC: 0.05 NG/ML
RBC # BLD AUTO: 3.66 X10(6)UL (ref 3.8–5.3)
SODIUM SERPL-SCNC: 144 MMOL/L (ref 136–145)
WBC # BLD AUTO: 17.3 X10(3) UL (ref 4–11)

## 2019-08-24 PROCEDURE — 80048 BASIC METABOLIC PNL TOTAL CA: CPT | Performed by: INTERNAL MEDICINE

## 2019-08-24 PROCEDURE — 85025 COMPLETE CBC W/AUTO DIFF WBC: CPT | Performed by: INTERNAL MEDICINE

## 2019-08-24 PROCEDURE — 84145 PROCALCITONIN (PCT): CPT | Performed by: INTERNAL MEDICINE

## 2019-08-24 PROCEDURE — 96376 TX/PRO/DX INJ SAME DRUG ADON: CPT

## 2019-08-24 PROCEDURE — 96372 THER/PROPH/DIAG INJ SC/IM: CPT

## 2019-08-24 RX ORDER — ALBUTEROL SULFATE 90 UG/1
2 AEROSOL, METERED RESPIRATORY (INHALATION) EVERY 4 HOURS PRN
Status: DISCONTINUED | OUTPATIENT
Start: 2019-08-24 | End: 2019-08-24

## 2019-08-24 RX ORDER — IPRATROPIUM BROMIDE AND ALBUTEROL SULFATE 2.5; .5 MG/3ML; MG/3ML
3 SOLUTION RESPIRATORY (INHALATION)
Status: DISCONTINUED | OUTPATIENT
Start: 2019-08-24 | End: 2019-08-24

## 2019-08-24 RX ORDER — ALBUTEROL SULFATE 90 UG/1
2 AEROSOL, METERED RESPIRATORY (INHALATION) EVERY 4 HOURS PRN
Qty: 1 INHALER | Refills: 3 | Status: SHIPPED | OUTPATIENT
Start: 2019-08-24 | End: 2020-01-09 | Stop reason: ALTCHOICE

## 2019-08-24 RX ORDER — CODEINE PHOSPHATE AND GUAIFENESIN 10; 100 MG/5ML; MG/5ML
10 SOLUTION ORAL EVERY 4 HOURS PRN
Status: DISCONTINUED | OUTPATIENT
Start: 2019-08-24 | End: 2019-08-24

## 2019-08-24 RX ORDER — PREDNISONE 20 MG/1
40 TABLET ORAL DAILY
Qty: 10 TABLET | Refills: 0 | Status: SHIPPED | OUTPATIENT
Start: 2019-08-24 | End: 2019-08-29

## 2019-08-24 RX ORDER — METHYLPREDNISOLONE SODIUM SUCCINATE 125 MG/2ML
60 INJECTION, POWDER, LYOPHILIZED, FOR SOLUTION INTRAMUSCULAR; INTRAVENOUS ONCE
Status: COMPLETED | OUTPATIENT
Start: 2019-08-24 | End: 2019-08-24

## 2019-08-24 NOTE — DISCHARGE SUMMARY
General Medicine Discharge Summary     Patient ID:  Becky Rodríguez  59year old  10/6/1960    Admit date: 8/23/2019    Discharge date and time: 8/24/19    Attending Physician: Wellington Howe, puff into the lungs daily. Albuterol Sulfate  (90 Base) MCG/ACT Inhalation Aero Soln  Inhale 2 puffs into the lungs every 4 (four) hours as needed for Wheezing. predniSONE 20 MG Oral Tab  Take 2 tablets (40 mg total) by mouth daily for 5 days. nondistended, intact BS  Extremities: no pedal edema   Neuro: CN inact, no focal deficits    Total time coordinating care for discharge: Greater than 30 minutes    Charles Presley DO  [facility] Hospitalist  [phone]

## 2019-08-24 NOTE — PLAN OF CARE
Problem: pneumonia  Data: Patient alert and oriented overnight, on 3L O2 per NC to maintain saturation >90%. Patient denies pain, strong, non-productive cough noted. Patient up in room as tolerated, voiding freely, vital signs stable.    Action: Due medicat

## 2019-08-24 NOTE — CONSULTS
Pulmonary Consult       NAME: Ayad Nevarez - ROOM: [facility]/491-O - MRN: [id number] - Age: 59year old - :  10/6/1960    Date of Admission: 2019 10:17 AM  Admission Diagnosis: Acute bronchospasm [J98.01]  Hypoxia [R09.02]    History of Present Illn Take 1 capsule by mouth daily. Disp:  Rfl:  8/22/2019 at Unknown time   HYDROcodone-acetaminophen 7.5-325 MG Oral Tab Take 1 tablet by mouth every 8 (eight) hours as needed (Cough).  Disp: 60 tablet Rfl: 0 8/22/2019 at Unknown time   aspirin 325 MG Oral Tab Tobacco Use      Smoking status: Former Smoker        Packs/day: 2.00        Years: 30.00        Pack years: 60        Types: Cigarettes      Smokeless tobacco: Never Used      Tobacco comment: when she smokes-smokes a few a day    Substance and Sexual Act hypoglycemia   • Breast Cancer Maternal Aunt         unknown ago    • Breast Cancer Paternal Aunt 36        40;s         Scheduled Medication:  • ipratropium-albuterol  3 mL Nebulization 6 times per day   • cefTRIAXone  1 g Intravenous Q24H   • enoxapa cooperative, no distress, appears stated age. Head:  Normocephalic, without obvious abnormality, atraumatic. Eyes:  Conjunctivae/corneas clear. Neck: Supple, symmetrical, trachea midline, no adenopathy,  no carotid bruit and no JVD.    Back:   Non ten Negative    Bordetella Pertussis PCR Negative Negative    Chlamydia pneumonia PCR: Negative Negative    Mycoplasma pneumonia PCR: Negative Negative   BASIC METABOLIC PANEL (8)    Collection Time: 08/24/19  6:28 AM   Result Value Ref Range    Glucose 144 (H 4. Dispo - ok to home   -follow up with pulzohaib SCHUMACHER in 2-3 weeks                  [place]  8/24/2019

## 2019-10-01 ENCOUNTER — TELEPHONE (OUTPATIENT)
Dept: SURGERY | Facility: CLINIC | Age: 59
End: 2019-10-01

## 2019-10-01 NOTE — TELEPHONE ENCOUNTER
Per patient reminder: \"Discussed MRA results with patient       We will plan to repeat MRA in October and have patient return to clinic once imaging has been completed. Matthew Bennettar has been placed for imaging       Continue  mg. \"    MRA brain order h

## 2019-10-25 ENCOUNTER — HOSPITAL ENCOUNTER (OUTPATIENT)
Dept: MRI IMAGING | Facility: HOSPITAL | Age: 59
Discharge: HOME | End: 2019-10-25
Attending: NURSE PRACTITIONER
Payer: COMMERCIAL

## 2019-10-25 DIAGNOSIS — I67.1 CEREBRAL ANEURYSM WITHOUT RUPTURE: ICD-10-CM

## 2019-10-25 PROCEDURE — 70544 MR ANGIOGRAPHY HEAD W/O DYE: CPT | Performed by: NURSE PRACTITIONER

## 2019-10-30 ENCOUNTER — OFFICE VISIT (OUTPATIENT)
Dept: SURGERY | Facility: CLINIC | Age: 59
End: 2019-10-30
Payer: COMMERCIAL

## 2019-10-30 VITALS — HEART RATE: 84 BPM | DIASTOLIC BLOOD PRESSURE: 78 MMHG | SYSTOLIC BLOOD PRESSURE: 138 MMHG

## 2019-10-30 DIAGNOSIS — I67.1 CEREBRAL ANEURYSM: Primary | ICD-10-CM

## 2019-10-30 PROCEDURE — 99213 OFFICE O/P EST LOW 20 MIN: CPT | Performed by: RADIOLOGY

## 2019-10-30 NOTE — PROGRESS NOTES
MRA brain 10/25/19 completed. Patient here for f/u, LOV 4/10/19  \"Cerebral aneurysm, nonruptured\"    Patient states she is feeling well, but had a lung issues in August with a hospital admission.       Patient denies having headaches, but has issues wi

## 2019-10-31 ENCOUNTER — TELEPHONE (OUTPATIENT)
Dept: SURGERY | Facility: CLINIC | Age: 59
End: 2019-10-31

## 2019-10-31 NOTE — TELEPHONE ENCOUNTER
Please place reminder patient will need a non contrast MRA brain in 1 year as well as a follow up clinic appointment to review results.   thanks

## 2019-10-31 NOTE — PROGRESS NOTES
[facility]  Interventional Neuroradiology Progress Note    Sheldon Severin    MAGGY 10/6/1960 MRN [id number]   Location [address] [place] [address], [address], [address] PCP Emi Beckman DO       Subjective:   We had the pleasure of seeing Rita Oliveira no dysarthria  Memory and comprehension: Intact   Cranial Nerves: VFF, PERRL 3mm brisk, EOMI, no nystagmus, facial sensation intact, face symmetric, tongue midline, shoulder shrug equal, remainder CN intact  Motor: No drift, no focal arm or leg weakness  S Rfl: 1  •  Ferrous Sulfate (IRON SUPPLEMENT) 325 (65 Fe) MG Oral Tab, Take 1 tablet by mouth 2 (two) times daily. , Disp: , Rfl:   •  Levocetirizine Dihydrochloride 5 MG Oral Tab, Take 5 mg by mouth every evening., Disp: , Rfl:   •  B Complex-C-Folic Acid (

## 2019-11-01 NOTE — TELEPHONE ENCOUNTER
Reminder placed, \"patient will need a non contrast MRA brain in 1 year as well as a follow up clinic appointment to review results\"

## 2020-01-20 ENCOUNTER — HOSPITAL ENCOUNTER (EMERGENCY)
Facility: HOSPITAL | Age: 60
Discharge: LEFT BEFORE DISCHARGE | End: 2020-01-20
Attending: EMERGENCY MEDICINE
Payer: COMMERCIAL

## 2020-01-20 ENCOUNTER — APPOINTMENT (OUTPATIENT)
Dept: GENERAL RADIOLOGY | Facility: HOSPITAL | Age: 60
End: 2020-01-20
Attending: EMERGENCY MEDICINE
Payer: COMMERCIAL

## 2020-01-20 VITALS
HEART RATE: 77 BPM | BODY MASS INDEX: 32.44 KG/M2 | TEMPERATURE: 98 F | WEIGHT: 190 LBS | DIASTOLIC BLOOD PRESSURE: 65 MMHG | HEIGHT: 64 IN | SYSTOLIC BLOOD PRESSURE: 132 MMHG | OXYGEN SATURATION: 95 % | RESPIRATION RATE: 20 BRPM

## 2020-01-20 DIAGNOSIS — J98.01 ACUTE BRONCHOSPASM: Primary | ICD-10-CM

## 2020-01-20 LAB
ADENOVIRUS PCR:: NEGATIVE
ALBUMIN SERPL-MCNC: 3.4 G/DL (ref 3.4–5)
ALBUMIN/GLOB SERPL: 0.8 {RATIO} (ref 1–2)
ALP LIVER SERPL-CCNC: 95 U/L (ref 50–130)
ALT SERPL-CCNC: 57 U/L (ref 13–56)
ANION GAP SERPL CALC-SCNC: 6 MMOL/L (ref 0–18)
AST SERPL-CCNC: 70 U/L (ref 15–37)
B PERT DNA SPEC QL NAA+PROBE: NEGATIVE
BASOPHILS # BLD AUTO: 0.04 X10(3) UL (ref 0–0.2)
BASOPHILS NFR BLD AUTO: 0.5 %
BILIRUB SERPL-MCNC: 0.3 MG/DL (ref 0.1–2)
BUN BLD-MCNC: 14 MG/DL (ref 7–18)
BUN/CREAT SERPL: 20.3 (ref 10–20)
C PNEUM DNA SPEC QL NAA+PROBE: NEGATIVE
CALCIUM BLD-MCNC: 8.8 MG/DL (ref 8.5–10.1)
CHLORIDE SERPL-SCNC: 111 MMOL/L (ref 98–112)
CO2 SERPL-SCNC: 22 MMOL/L (ref 21–32)
CORONAVIRUS 229E PCR:: NEGATIVE
CORONAVIRUS HKU1 PCR:: NEGATIVE
CORONAVIRUS NL63 PCR:: NEGATIVE
CORONAVIRUS OC43 PCR:: NEGATIVE
CREAT BLD-MCNC: 0.69 MG/DL (ref 0.55–1.02)
DEPRECATED RDW RBC AUTO: 47.9 FL (ref 35.1–46.3)
EOSINOPHIL # BLD AUTO: 0.48 X10(3) UL (ref 0–0.7)
EOSINOPHIL NFR BLD AUTO: 6 %
ERYTHROCYTE [DISTWIDTH] IN BLOOD BY AUTOMATED COUNT: 13.8 % (ref 11–15)
FLUAV RNA SPEC QL NAA+PROBE: NEGATIVE
FLUBV RNA SPEC QL NAA+PROBE: NEGATIVE
GLOBULIN PLAS-MCNC: 4.5 G/DL (ref 2.8–4.4)
GLUCOSE BLD-MCNC: 89 MG/DL (ref 70–99)
HCT VFR BLD AUTO: 41 % (ref 35–48)
HGB BLD-MCNC: 13.5 G/DL (ref 12–16)
IMM GRANULOCYTES # BLD AUTO: 0.03 X10(3) UL (ref 0–1)
IMM GRANULOCYTES NFR BLD: 0.4 %
LYMPHOCYTES # BLD AUTO: 1.43 X10(3) UL (ref 1–4)
LYMPHOCYTES NFR BLD AUTO: 17.8 %
M PROTEIN MFR SERPL ELPH: 7.9 G/DL (ref 6.4–8.2)
MCH RBC QN AUTO: 31.4 PG (ref 26–34)
MCHC RBC AUTO-ENTMCNC: 32.9 G/DL (ref 31–37)
MCV RBC AUTO: 95.3 FL (ref 80–100)
METAPNEUMOVIRUS PCR:: POSITIVE
MONOCYTES # BLD AUTO: 0.7 X10(3) UL (ref 0.1–1)
MONOCYTES NFR BLD AUTO: 8.7 %
MYCOPLASMA PNEUMONIA PCR:: NEGATIVE
NEUTROPHILS # BLD AUTO: 5.36 X10 (3) UL (ref 1.5–7.7)
NEUTROPHILS # BLD AUTO: 5.36 X10(3) UL (ref 1.5–7.7)
NEUTROPHILS NFR BLD AUTO: 66.6 %
NT-PROBNP SERPL-MCNC: 439 PG/ML (ref ?–125)
OSMOLALITY SERPL CALC.SUM OF ELEC: 288 MOSM/KG (ref 275–295)
PARAINFLUENZA 1 PCR:: NEGATIVE
PARAINFLUENZA 2 PCR:: NEGATIVE
PARAINFLUENZA 3 PCR:: NEGATIVE
PARAINFLUENZA 4 PCR:: NEGATIVE
PLATELET # BLD AUTO: 213 10(3)UL (ref 150–450)
POTASSIUM SERPL-SCNC: 4.1 MMOL/L (ref 3.5–5.1)
RBC # BLD AUTO: 4.3 X10(6)UL (ref 3.8–5.3)
RHINOVIRUS/ENTERO PCR:: NEGATIVE
RSV RNA SPEC QL NAA+PROBE: NEGATIVE
SODIUM SERPL-SCNC: 139 MMOL/L (ref 136–145)
TROPONIN I SERPL-MCNC: <0.045 NG/ML (ref ?–0.04)
WBC # BLD AUTO: 8 X10(3) UL (ref 4–11)

## 2020-01-20 PROCEDURE — 99284 EMERGENCY DEPT VISIT MOD MDM: CPT | Performed by: EMERGENCY MEDICINE

## 2020-01-20 PROCEDURE — 96374 THER/PROPH/DIAG INJ IV PUSH: CPT | Performed by: EMERGENCY MEDICINE

## 2020-01-20 PROCEDURE — 87633 RESP VIRUS 12-25 TARGETS: CPT | Performed by: EMERGENCY MEDICINE

## 2020-01-20 PROCEDURE — 96375 TX/PRO/DX INJ NEW DRUG ADDON: CPT | Performed by: EMERGENCY MEDICINE

## 2020-01-20 PROCEDURE — 83880 ASSAY OF NATRIURETIC PEPTIDE: CPT | Performed by: EMERGENCY MEDICINE

## 2020-01-20 PROCEDURE — 94644 CONT INHLJ TX 1ST HOUR: CPT

## 2020-01-20 PROCEDURE — 93010 ELECTROCARDIOGRAM REPORT: CPT | Performed by: EMERGENCY MEDICINE

## 2020-01-20 PROCEDURE — 93005 ELECTROCARDIOGRAM TRACING: CPT

## 2020-01-20 PROCEDURE — 87798 DETECT AGENT NOS DNA AMP: CPT | Performed by: EMERGENCY MEDICINE

## 2020-01-20 PROCEDURE — 94645 CONT INHLJ TX EACH ADDL HOUR: CPT

## 2020-01-20 PROCEDURE — 99285 EMERGENCY DEPT VISIT HI MDM: CPT | Performed by: EMERGENCY MEDICINE

## 2020-01-20 PROCEDURE — 80053 COMPREHEN METABOLIC PANEL: CPT | Performed by: EMERGENCY MEDICINE

## 2020-01-20 PROCEDURE — 84484 ASSAY OF TROPONIN QUANT: CPT | Performed by: EMERGENCY MEDICINE

## 2020-01-20 PROCEDURE — 87581 M.PNEUMON DNA AMP PROBE: CPT | Performed by: EMERGENCY MEDICINE

## 2020-01-20 PROCEDURE — 87486 CHLMYD PNEUM DNA AMP PROBE: CPT | Performed by: EMERGENCY MEDICINE

## 2020-01-20 PROCEDURE — 71045 X-RAY EXAM CHEST 1 VIEW: CPT | Performed by: EMERGENCY MEDICINE

## 2020-01-20 PROCEDURE — 85025 COMPLETE CBC W/AUTO DIFF WBC: CPT | Performed by: EMERGENCY MEDICINE

## 2020-01-20 RX ORDER — PREDNISONE 20 MG/1
60 TABLET ORAL DAILY
Qty: 15 TABLET | Refills: 0 | Status: SHIPPED | OUTPATIENT
Start: 2020-01-20 | End: 2020-01-25

## 2020-01-20 RX ORDER — METHYLPREDNISOLONE SODIUM SUCCINATE 125 MG/2ML
125 INJECTION, POWDER, LYOPHILIZED, FOR SOLUTION INTRAMUSCULAR; INTRAVENOUS ONCE
Status: COMPLETED | OUTPATIENT
Start: 2020-01-20 | End: 2020-01-20

## 2020-01-20 RX ORDER — FUROSEMIDE 10 MG/ML
20 INJECTION INTRAMUSCULAR; INTRAVENOUS ONCE
Status: COMPLETED | OUTPATIENT
Start: 2020-01-20 | End: 2020-01-20

## 2020-01-20 NOTE — ED PROVIDER NOTES
Patient Seen in: [facility] Emergency Department      History   Patient presents with:  Dyspnea FAITH SOB    Stated Complaint: FAITH all day- 90% RA- tight- refused EMS- hx ARDS- duoneb given.      HPI    This is a 55-year-old female who states that she LEFT  2006    twice (sebaceous cyst)   • OTHER      had tracheostomy for several weeks in the 1980's   • OTHER SURGICAL HISTORY  0908    laser surgery   • OTHER SURGICAL HISTORY      laparoscopy ov cyst   • OTHER SURGICAL HISTORY  2009    laser vulva condy bilaterally. There is no calf tenderness. There is no rash noted. There is no edema    NEURO: Alert and oriented x3. Muscle strength and sensory exam is grossly normal.  And the patient is neurologically intact with no focal findings.       ED Course There are some nonspecific ST changes are noted no acute ST elevation compared to an old EKG the ST changes were present previously although they were more pronounced on the previous EKG.     The patient's respiratory flu panel was positive for metapneumovi discussed with her if she is going home then I would recommend that she can always return here but I recommend close follow-up with her primary MD and her pulmonary specialist.  I discussed with her that she can always come back if she has any trouble victor manuel

## 2020-01-21 LAB
ATRIAL RATE: 85 BPM
P AXIS: 68 DEGREES
P-R INTERVAL: 142 MS
Q-T INTERVAL: 392 MS
QRS DURATION: 96 MS
QTC CALCULATION (BEZET): 466 MS
R AXIS: 47 DEGREES
T AXIS: 83 DEGREES
VENTRICULAR RATE: 85 BPM

## 2020-01-21 NOTE — ED NOTES
Assumed [facility] Pt just used the bedside commode, (+) stool and urine. Pt states breathing feels much better.  She denies pain

## 2020-02-11 ENCOUNTER — HOSPITAL ENCOUNTER (OUTPATIENT)
Dept: CV DIAGNOSTICS | Facility: HOSPITAL | Age: 60
Discharge: HOME | End: 2020-02-11
Attending: INTERNAL MEDICINE
Payer: COMMERCIAL

## 2020-02-11 ENCOUNTER — HOSPITAL ENCOUNTER (OUTPATIENT)
Dept: INTERVENTIONAL RADIOLOGY/VASCULAR | Facility: HOSPITAL | Age: 60
Discharge: HOME | End: 2020-02-11
Attending: INTERNAL MEDICINE | Admitting: INTERNAL MEDICINE
Payer: COMMERCIAL

## 2020-02-11 VITALS
WEIGHT: 193 LBS | BODY MASS INDEX: 32.95 KG/M2 | OXYGEN SATURATION: 93 % | DIASTOLIC BLOOD PRESSURE: 79 MMHG | HEIGHT: 64 IN | TEMPERATURE: 96 F | HEART RATE: 75 BPM | RESPIRATION RATE: 19 BRPM | SYSTOLIC BLOOD PRESSURE: 140 MMHG

## 2020-02-11 DIAGNOSIS — R06.00 DOE (DYSPNEA ON EXERTION): ICD-10-CM

## 2020-02-11 PROCEDURE — 93325 DOPPLER ECHO COLOR FLOW MAPG: CPT | Performed by: INTERNAL MEDICINE

## 2020-02-11 PROCEDURE — 93312 ECHO TRANSESOPHAGEAL: CPT

## 2020-02-11 PROCEDURE — B245ZZ4 ULTRASONOGRAPHY OF LEFT HEART, TRANSESOPHAGEAL: ICD-10-PCS | Performed by: INTERNAL MEDICINE

## 2020-02-11 PROCEDURE — 93320 DOPPLER ECHO COMPLETE: CPT | Performed by: INTERNAL MEDICINE

## 2020-02-11 RX ORDER — MIDAZOLAM HYDROCHLORIDE 1 MG/ML
INJECTION INTRAMUSCULAR; INTRAVENOUS
Status: COMPLETED
Start: 2020-02-11 | End: 2020-02-11

## 2020-02-11 RX ORDER — SODIUM CHLORIDE 9 MG/ML
INJECTION, SOLUTION INTRAVENOUS
Status: DISCONTINUED | OUTPATIENT
Start: 2020-02-12 | End: 2020-02-11

## 2020-02-11 RX ORDER — MIDAZOLAM HYDROCHLORIDE 1 MG/ML
4 INJECTION INTRAMUSCULAR; INTRAVENOUS ONCE
Status: COMPLETED | OUTPATIENT
Start: 2020-02-11 | End: 2020-02-11

## 2020-02-11 RX ADMIN — MIDAZOLAM HYDROCHLORIDE 4 MG: 1 INJECTION INTRAMUSCULAR; INTRAVENOUS at 11:30:00

## 2020-02-11 NOTE — PROGRESS NOTES
Patient here for elective SUSANNAH. Informed consent obtained. Patient prepped per protocol. Dr Doris El here to speak with patient and  prior to procedure. When ready, patient sedated and procedure done at bedside with Dr Doris El.  Patient nayan

## 2020-02-11 NOTE — H&P
The patient was seen and examined. There was no change in the patient's clinical status from the date of the last progress note, to the date of their procedure today at [facility]. Thus, the progress note is up-to-date.     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       • IPRATROPIUM-ALBUTEROL 0.5-2.5 (3) MG/3ML Inhalation Solution Take 3 ml (1 vial) by nebulation every 4 hours as needed 90 mL 1   • A bleeding  Musculoskeletal:negative for myalgias  Neurological: negative for dizziness and headaches  Endocrine: negative for temperature intolerance        PHYSICAL EXAM:   /66   Pulse 80   Ht 5' 4\" (1.626 m)   Wt 193 lb (87.5 kg)   SpO2 98%   BMI 3 ALT (SGPT) (IU/L)   Date Value   09/30/2014 30   03/24/2014 23   07/24/2013 26            ALT   Date Value   01/20/2020 57 U/L (H)   08/23/2019         Comment:       Test Cancelled: Specimen was hemolyzed.      06/28/2019 55 U/L (H)   01/28/2019 37 U/L PM MD DUNIA Marin MD, [facility]  1/31/2020  3:05 PM

## 2020-02-14 NOTE — PROCEDURES
[address] Cardiology  Transesophageal Echocardiogram Report    PREOPERATIVE DIAGNOSIS: AI    POSTOPERATIVE DIAGNOSIS: AI    PROCEDURE PERFORMED: Transesophageal echocardiogram with Doppler (CPT code 29445)     TECHNIQUE: The risks, benefits, and function. The visualized portions of the tricuspid valve, pulmonic and mitral valve have normal morphology and motion. A 21mm Perez Inspiris valve is in the aortic position. There is no evidence of valvular vegetations, intracardiac masses or thrombi.  Rylie Amass

## 2020-10-05 ENCOUNTER — HOSPITAL ENCOUNTER (OUTPATIENT)
Dept: GENERAL RADIOLOGY | Age: 60
Discharge: HOME | End: 2020-10-05
Attending: INTERNAL MEDICINE
Payer: COMMERCIAL

## 2020-10-05 DIAGNOSIS — R06.00 DOE (DYSPNEA ON EXERTION): ICD-10-CM

## 2020-10-05 DIAGNOSIS — Z95.2 AORTIC VALVE REPLACED: ICD-10-CM

## 2020-10-05 PROCEDURE — 71046 X-RAY EXAM CHEST 2 VIEWS: CPT | Performed by: INTERNAL MEDICINE

## 2020-10-16 ENCOUNTER — OFFICE VISIT (OUTPATIENT)
Dept: FAMILY MEDICINE CLINIC | Facility: CLINIC | Age: 60
End: 2020-10-16
Payer: COMMERCIAL

## 2020-10-16 ENCOUNTER — HOSPITAL ENCOUNTER (OUTPATIENT)
Age: 60
Discharge: HOME | End: 2020-10-16
Payer: COMMERCIAL

## 2020-10-16 VITALS
OXYGEN SATURATION: 99 % | SYSTOLIC BLOOD PRESSURE: 132 MMHG | RESPIRATION RATE: 18 BRPM | TEMPERATURE: 97 F | HEIGHT: 64 IN | HEART RATE: 74 BPM | WEIGHT: 165 LBS | BODY MASS INDEX: 28.17 KG/M2 | DIASTOLIC BLOOD PRESSURE: 80 MMHG

## 2020-10-16 VITALS
TEMPERATURE: 98 F | HEART RATE: 67 BPM | DIASTOLIC BLOOD PRESSURE: 69 MMHG | RESPIRATION RATE: 18 BRPM | SYSTOLIC BLOOD PRESSURE: 127 MMHG | OXYGEN SATURATION: 96 %

## 2020-10-16 DIAGNOSIS — Z02.9 ENCOUNTER FOR ADMINISTRATIVE EXAMINATIONS, UNSPECIFIED: Primary | ICD-10-CM

## 2020-10-16 DIAGNOSIS — S61.210A LACERATION OF RIGHT INDEX FINGER WITHOUT FOREIGN BODY WITHOUT DAMAGE TO NAIL, INITIAL ENCOUNTER: Primary | ICD-10-CM

## 2020-10-16 PROCEDURE — 12001 RPR S/N/AX/GEN/TRNK 2.5CM/<: CPT

## 2020-10-16 PROCEDURE — 3075F SYST BP GE 130 - 139MM HG: CPT | Performed by: NURSE PRACTITIONER

## 2020-10-16 PROCEDURE — 3008F BODY MASS INDEX DOCD: CPT | Performed by: NURSE PRACTITIONER

## 2020-10-16 PROCEDURE — 90471 IMMUNIZATION ADMIN: CPT

## 2020-10-16 PROCEDURE — 99213 OFFICE O/P EST LOW 20 MIN: CPT

## 2020-10-16 PROCEDURE — 3079F DIAST BP 80-89 MM HG: CPT | Performed by: NURSE PRACTITIONER

## 2020-10-16 NOTE — PROGRESS NOTES
CHIEF COMPLAINT:   Patient presents with:  Wound Care: right index finger at 11:00am, cut while using a knife. per pt bled for 1 hour      HPI:     Lori Hopper is a 56year old female who presents with concerns of cut to finger.   Bleed 1 hour and st

## 2020-10-16 NOTE — ED PROVIDER NOTES
Patient Seen in: [facility] [place]      History   Patient presents with:  Laceration    Stated Complaint: TL - cut right index finger on knife 1 hour ago.   Still bleeding, held pressure*    HPI  68-year-old female with no list tetanus presents w Years since quittin.7      Smokeless tobacco: Never Used      Tobacco comment: when she smokes-smokes a few a day    Alcohol use:  Yes      Alcohol/week: 0.0 standard drinks      Frequency: 4 or more times a week      Drinks per session: 1 or 2 Location: Right second index finger  Size: 1.5 cm  Shape: Horizontal linear  FB present: None  Cleansing: Irrigation  Wound closure: 3, 5-0 nylon simple interrupted sutures  N/V intact: Yes  Tendon/Function intact: Yes  Dressing type: Tubegauz  Td: Updated

## 2020-10-29 ENCOUNTER — HOSPITAL ENCOUNTER (OUTPATIENT)
Age: 60
Discharge: HOME | End: 2020-10-29
Payer: COMMERCIAL

## 2020-10-29 VITALS
SYSTOLIC BLOOD PRESSURE: 137 MMHG | OXYGEN SATURATION: 96 % | HEART RATE: 79 BPM | TEMPERATURE: 97 F | DIASTOLIC BLOOD PRESSURE: 62 MMHG | RESPIRATION RATE: 20 BRPM

## 2020-10-29 DIAGNOSIS — Z48.02 ENCOUNTER FOR REMOVAL OF SUTURES: Primary | ICD-10-CM

## 2020-10-29 NOTE — ED PROVIDER NOTES
Patient Seen in: [facility] [place]      History   Patient presents with:  Mary Kay Enriquez    Stated Complaint: suture removal    HPI  Dejah Guadarrama is a 56-year-old female who presents today for removal of sutures from her right index finger. questions and concerns are addressed to the patient's satisfaction prior to discharge today.          Disposition and Plan     Clinical Impression:  Encounter for removal of sutures  (primary encounter diagnosis)    Disposition:  Discharge  10/29/2020 11:20

## 2020-11-02 ENCOUNTER — TELEPHONE (OUTPATIENT)
Dept: SURGERY | Facility: CLINIC | Age: 60
End: 2020-11-02

## 2020-11-02 DIAGNOSIS — Z95.828 MRI-SAFE ENDOVASCULAR ANEURYSM COIL PRESENT: ICD-10-CM

## 2020-11-02 DIAGNOSIS — I67.1 CEREBRAL ANEURYSM WITHOUT RUPTURE: Primary | ICD-10-CM

## 2020-11-02 NOTE — TELEPHONE ENCOUNTER
Per patient reminder \"patient will need a non contrast MRA brain in 1 year as well as a follow up clinic appointment to review results \"    Order needed.

## 2020-11-03 NOTE — TELEPHONE ENCOUNTER
Orders placed. Please have patient scheduled for follow up with me or with Dr. Norma Beckman for in office or tele visit.

## 2020-11-09 ENCOUNTER — HOSPITAL ENCOUNTER (OUTPATIENT)
Dept: MRI IMAGING | Facility: HOSPITAL | Age: 60
Discharge: HOME | End: 2020-11-09
Attending: NURSE PRACTITIONER
Payer: COMMERCIAL

## 2020-11-09 DIAGNOSIS — Z95.828 MRI-SAFE ENDOVASCULAR ANEURYSM COIL PRESENT: ICD-10-CM

## 2020-11-09 DIAGNOSIS — I67.1 CEREBRAL ANEURYSM WITHOUT RUPTURE: ICD-10-CM

## 2020-11-09 PROCEDURE — 70544 MR ANGIOGRAPHY HEAD W/O DYE: CPT | Performed by: NURSE PRACTITIONER

## 2020-11-18 ENCOUNTER — OFFICE VISIT (OUTPATIENT)
Dept: SURGERY | Facility: CLINIC | Age: 60
End: 2020-11-18
Payer: COMMERCIAL

## 2020-11-18 VITALS — SYSTOLIC BLOOD PRESSURE: 126 MMHG | OXYGEN SATURATION: 97 % | HEART RATE: 67 BPM | DIASTOLIC BLOOD PRESSURE: 60 MMHG

## 2020-11-18 DIAGNOSIS — I67.1 CEREBRAL ANEURYSM WITHOUT RUPTURE: Primary | ICD-10-CM

## 2020-11-18 PROCEDURE — 3074F SYST BP LT 130 MM HG: CPT | Performed by: NURSE PRACTITIONER

## 2020-11-18 PROCEDURE — 3078F DIAST BP <80 MM HG: CPT | Performed by: NURSE PRACTITIONER

## 2020-11-18 PROCEDURE — 99213 OFFICE O/P EST LOW 20 MIN: CPT | Performed by: NURSE PRACTITIONER

## 2020-11-18 PROCEDURE — 99072 ADDL SUPL MATRL&STAF TM PHE: CPT | Performed by: NURSE PRACTITIONER

## 2020-11-18 NOTE — PROGRESS NOTES
[facility]  Interventional Neuroradiology Progress Note    Edgardo Hollis     10/6/1960 MRN [id number]   Location ED [facility], [address], [address] PCP Tom Odonnell, DO       Subjective:   We had the pleasure of seeing Mellissa Braeden midline, shoulder shrug equal, remainder CN intact  Motor: No drift, no focal arm or leg weakness  Sensory: Intact to light touch  Coordination: FTN and HTS intact bilaterally  Gait: normal casual gait no los of balance observed    Current Outpatient Medic communicating artery complex. There is interval development of a 3 mm focus of flow related enhancement along the base of the coil embolization pack that is   concerning for recurrent aneurysm secondary to coil compaction.   This could be further assessed

## 2020-11-23 PROBLEM — J45.40 MODERATE PERSISTENT ASTHMA WITHOUT COMPLICATION: Status: ACTIVE | Noted: 2020-11-23

## 2021-02-19 ENCOUNTER — TELEPHONE (OUTPATIENT)
Dept: SURGERY | Facility: CLINIC | Age: 61
End: 2021-02-19

## 2021-02-19 NOTE — TELEPHONE ENCOUNTER
Per last OV notes on 11-17 \"Plan: We will plan to repeat MRA brain imaging in 1 year (Nov 2021). Reminder placed.

## 2021-03-02 NOTE — PROGRESS NOTES
[address] Patient Status:  Inpatient    10/6/1960 MRN [id number]   [facility] 6NE-A Attending Giorgi Nance MD   [facility] Day # 7 PCP Tom Odonnell DO     Pulm / Critical Care Progress Note     S: progressive resp d Patient informed of pap results, also informed doctor does have a sample inhaler for her.     Patient states thank you very much for the inhalers and will be in 3-3-21 to  the samples.        02/02/18  0600 02/02/18  0700   BP:  109/84 111/79 125/79   BP Location:       Pulse: 100  110 60   Resp:   16 17   Temp:       TempSrc:       SpO2: 100%  100% 93%   Weight:       Height:            O2: on bipap, 90% Fio2.        Wt Readings from Last 3 Enc always a consideration but with how abnl xray looks I think we have the explanation, and venous dopplers negative. .   - despite bipap pt appears dyspneic. Will proceed with intubation.    - send sputum cx post intubation  - empiric abx with zosyn (has all

## 2021-03-24 ENCOUNTER — IMMUNIZATION (OUTPATIENT)
Dept: LAB | Age: 61
End: 2021-03-24
Attending: HOSPITALIST
Payer: COMMERCIAL

## 2021-03-24 DIAGNOSIS — Z23 NEED FOR VACCINATION: Primary | ICD-10-CM

## 2021-03-24 PROCEDURE — 0001A SARSCOV2 VAC 30MCG/0.3ML IM: CPT

## 2021-04-14 ENCOUNTER — IMMUNIZATION (OUTPATIENT)
Dept: LAB | Age: 61
End: 2021-04-14
Attending: HOSPITALIST
Payer: COMMERCIAL

## 2021-04-14 DIAGNOSIS — Z23 NEED FOR VACCINATION: Primary | ICD-10-CM

## 2021-04-14 PROCEDURE — 0002A SARSCOV2 VAC 30MCG/0.3ML IM: CPT

## 2021-06-19 NOTE — H&P
8-7-18  Attempt 2: Care Guide called patient. Left voice message to call Scarlett, Clinic Care Guide at [phone].  If this patient is returning my call, please transfer to [phone].  Will follow up with patient in clinic Thursday 8-9-18 at 9am.       [facility] Hospitalist History and Physical      Patient presents with:  Dyspnea FAITH SOB (respiratory)       PCP: Yisel Fuller DO      History of Present Illness: Patient is a 59year old female with PMH sig for h/o ARDS, depression, anxiety, migraines, HTN and Packs/day: 2.00        Years: 30.00        Pack years: 60        Types: Cigarettes      Smokeless tobacco: Never Used      Tobacco comment: when she smokes-smokes a few a day    Alcohol use:  Yes      Alcohol/week: 0.0 standard drinks      Frequency: 4 or m Chest wall:  No tenderness or deformity   Heart:  Regular rate and rhythm, S1, S2 normal, no murmur, rub or gallop appreciated   Abdomen:   Soft, non-tender. Bowel sounds normal. No masses,  No organomegaly.  Non distended   Extremities: Extremities hector AORTA:  No aneurysm or visible dissection. LUNGS:  There are patchy airspace opacities within bilateral lower lobes as well as to a lesser degree within the right middle lobe and lingula which are of uncertain significance.   Incidental small calcified gra Dictated by: Wade Gastelum MD on 8/23/2019 at 9:15     Approved by: Wade Gastelum MD               Assessment/Plan:     #Cough/hypoxia/SOB- suspect 2/2 PNA  -ct chest neg but possible early PNA  -given elevated wbc, will continue abx with iv ceftriaxon

## 2021-11-03 ENCOUNTER — TELEPHONE (OUTPATIENT)
Dept: SURGERY | Facility: CLINIC | Age: 61
End: 2021-11-03

## 2021-11-03 DIAGNOSIS — Z95.828 MRI-SAFE ENDOVASCULAR ANEURYSM COIL PRESENT: Primary | ICD-10-CM

## 2021-11-03 DIAGNOSIS — I67.1 CEREBRAL ANEURYSM WITHOUT RUPTURE: ICD-10-CM

## 2021-11-03 NOTE — TELEPHONE ENCOUNTER
Per pt reminder \"We will plan to repeat MRA brain imaging in 1 year (Nov 2021) \"    MRA order pended.

## 2021-11-09 NOTE — TELEPHONE ENCOUNTER
Order placed. Please advise patient. Can follow up with any [facility]AN provider (former Dr. Laura Hughes patient).

## 2022-04-07 NOTE — RESPIRATORY THERAPY NOTE
Received pt @ 2300 on 4L NC. Q4 duoneb tx. Diminished, exp wheezing on occasion. Will continue to monitor. Full term, , dx with TOF at birth, discharged home in 3 days.

## 2022-08-22 ENCOUNTER — LAB ENCOUNTER (OUTPATIENT)
Dept: LAB | Age: 62
End: 2022-08-22
Attending: INTERNAL MEDICINE
Payer: COMMERCIAL

## 2022-08-22 DIAGNOSIS — Z20.822 ENCOUNTER FOR PREPROCEDURE SCREENING LABORATORY TESTING FOR COVID-19: ICD-10-CM

## 2022-08-22 DIAGNOSIS — Z01.812 ENCOUNTER FOR PREPROCEDURE SCREENING LABORATORY TESTING FOR COVID-19: ICD-10-CM

## 2022-08-23 LAB — SARS-COV-2 RNA RESP QL NAA+PROBE: NOT DETECTED

## 2022-08-24 ENCOUNTER — ANESTHESIA (OUTPATIENT)
Dept: ENDOSCOPY | Facility: HOSPITAL | Age: 62
End: 2022-08-24
Payer: COMMERCIAL

## 2022-08-24 ENCOUNTER — HOSPITAL ENCOUNTER (OUTPATIENT)
Facility: HOSPITAL | Age: 62
Setting detail: HOSPITAL OUTPATIENT SURGERY
Discharge: HOME | End: 2022-08-24
Attending: INTERNAL MEDICINE | Admitting: INTERNAL MEDICINE
Payer: COMMERCIAL

## 2022-08-24 ENCOUNTER — ANESTHESIA EVENT (OUTPATIENT)
Dept: ENDOSCOPY | Facility: HOSPITAL | Age: 62
End: 2022-08-24
Payer: COMMERCIAL

## 2022-08-24 VITALS
HEIGHT: 64 IN | DIASTOLIC BLOOD PRESSURE: 71 MMHG | TEMPERATURE: 98 F | RESPIRATION RATE: 20 BRPM | OXYGEN SATURATION: 97 % | WEIGHT: 167 LBS | SYSTOLIC BLOOD PRESSURE: 138 MMHG | BODY MASS INDEX: 28.51 KG/M2 | HEART RATE: 63 BPM

## 2022-08-24 DIAGNOSIS — Z20.822 ENCOUNTER FOR PREPROCEDURE SCREENING LABORATORY TESTING FOR COVID-19: Primary | ICD-10-CM

## 2022-08-24 DIAGNOSIS — Z01.812 ENCOUNTER FOR PREPROCEDURE SCREENING LABORATORY TESTING FOR COVID-19: Primary | ICD-10-CM

## 2022-08-24 PROCEDURE — 0DBK8ZX EXCISION OF ASCENDING COLON, VIA NATURAL OR ARTIFICIAL OPENING ENDOSCOPIC, DIAGNOSTIC: ICD-10-PCS | Performed by: INTERNAL MEDICINE

## 2022-08-24 PROCEDURE — 88305 TISSUE EXAM BY PATHOLOGIST: CPT | Performed by: INTERNAL MEDICINE

## 2022-08-24 PROCEDURE — 0DB98ZX EXCISION OF DUODENUM, VIA NATURAL OR ARTIFICIAL OPENING ENDOSCOPIC, DIAGNOSTIC: ICD-10-PCS | Performed by: INTERNAL MEDICINE

## 2022-08-24 PROCEDURE — 0DB78ZX EXCISION OF STOMACH, PYLORUS, VIA NATURAL OR ARTIFICIAL OPENING ENDOSCOPIC, DIAGNOSTIC: ICD-10-PCS | Performed by: INTERNAL MEDICINE

## 2022-08-24 RX ORDER — LIDOCAINE HYDROCHLORIDE 10 MG/ML
INJECTION, SOLUTION EPIDURAL; INFILTRATION; INTRACAUDAL; PERINEURAL AS NEEDED
Status: DISCONTINUED | OUTPATIENT
Start: 2022-08-24 | End: 2022-08-24 | Stop reason: SURG

## 2022-08-24 RX ORDER — SODIUM CHLORIDE, SODIUM LACTATE, POTASSIUM CHLORIDE, CALCIUM CHLORIDE 600; 310; 30; 20 MG/100ML; MG/100ML; MG/100ML; MG/100ML
INJECTION, SOLUTION INTRAVENOUS CONTINUOUS
Status: DISCONTINUED | OUTPATIENT
Start: 2022-08-24 | End: 2022-08-24

## 2022-08-24 RX ADMIN — SODIUM CHLORIDE, SODIUM LACTATE, POTASSIUM CHLORIDE, CALCIUM CHLORIDE: 600; 310; 30; 20 INJECTION, SOLUTION INTRAVENOUS at 13:45:00

## 2022-08-24 RX ADMIN — LIDOCAINE HYDROCHLORIDE 25 MG: 10 INJECTION, SOLUTION EPIDURAL; INFILTRATION; INTRACAUDAL; PERINEURAL at 13:47:00

## 2022-08-24 NOTE — ANESTHESIA POSTPROCEDURE EVALUATION
[address] Patient Status:  Hospital Outpatient Surgery   Age/Gender 59year old female MRN [id number]   Location [address] [address] Attending Lauro Langley MD   Hosp Day # 0 PCP Rain Jaeger MD       Anesthesia Post-op Note    ESOPHAGOGASTRODUODENOSCOPY (EGD) WITH BIOPSIES; AND COLONOSCOPY WITH HOT AND SNARE POLYPECTOMY     Procedure Summary     Date: 08/24/22 Room / Location: [facility] ENDOSCOPY 03 / 140[facility] ENDOSCOPY    Anesthesia Start: 3106 Anesthesia Stop:     Procedures:       ESOPHAGOGASTRODUODENOSCOPY (EGD) WITH BIOPSIES; AND COLONOSCOPY WITH HOT AND SNARE POLYPECTOMY (N/A )      COLONOSCOPY (N/A ) Diagnosis: (GASTRITIS; POLYP)    Surgeons: Lauro Langley MD Anesthesiologist: Jo Ann Cyr MD    Anesthesia Type: MAC ASA Status: 2          Anesthesia Type: MAC    Vitals Value Taken Time   /78 08/24/22 1430   Temp 98.0 08/24/22 1434   Pulse 62 08/24/22 1433   Resp 16 08/24/22 1434   SpO2 98 % 08/24/22 1433   Vitals shown include unvalidated device data. Patient Location: Endoscopy    Anesthesia Type: MAC    Airway Patency: patent    Postop Pain Control: adequate    Mental Status: mildly sedated but able to meaningfully participate in the post-anesthesia evaluation    Nausea/Vomiting: none    Cardiopulmonary/Hydration status: stable euvolemic    Complications: no apparent anesthesia related complications    Postop vital signs: stable    Dental Exam: Unchanged from Preop    Patient to be discharged home when criteria met.

## 2023-10-26 NOTE — PROGRESS NOTES
[facility]  Progress Note    Renata Villafana Patient Status:  Inpatient    10/6/1960 MRN [id number]   [facility] 6NE-A Attending [place], MD   Hosp Day # 10 PCP Bong Lezama DO       SUBJECTIVE:  Extubated, sitting up in [facility] times per day   Normal Saline Flush 0.9 % injection 10 mL 10 mL Intravenous PRN   fentaNYL citrate (SUBLIMAZE) 0.05 MG/ML injection 25 mcg 25 mcg Intravenous Q30 Min PRN   Or      fentaNYL citrate (SUBLIMAZE) 0.05 MG/ML injection 50 mcg 50 mcg Intravenous glucose (DEX4) oral liquid 30 g 30 g Oral Q15 Min PRN   Or      Glucose-Vitamin C (DEX-4) 4-0.006 g chewable tab 8 tablet 8 tablet Oral Q15 Min PRN   atorvastatin (LIPITOR) tab 80 mg 80 mg Oral Nightly   venlafaxine ER (EFFEXOR-XR) ER cap/tab 75 mg 75 mg transitioned to SQ scale 1/28/18  - on insulin coverage QAC 1:20/140  - tube feeds on hold pending swallow evaluation   - will continue to follow qid accuchecks  - adjust insulin daily     2. AS s/p AVR 1/26/18  -per CV team    3.  Acute hypoxic respiratory Information: Selecting Yes will display possible errors in your note based on the variables you have selected. This validation is only offered as a suggestion for you. PLEASE NOTE THAT THE VALIDATION TEXT WILL BE REMOVED WHEN YOU FINALIZE YOUR NOTE. IF YOU WANT TO FAX A PRELIMINARY NOTE YOU WILL NEED TO TOGGLE THIS TO 'NO' IF YOU DO NOT WANT IT IN YOUR FAXED NOTE.

## 2023-12-22 NOTE — PLAN OF CARE
CARDIOVASCULAR - ADULT    • Maintains optimal cardiac output and hemodynamic stability Progressing    • Absence of cardiac arrhythmias or at baseline Progressing        PAIN - ADULT    • Verbalizes/displays adequate comfort level or patient's stated pain g Problem: Safety - Adult  Goal: Free from fall injury  Outcome: Progressing  Flowsheets (Taken 12/22/2023 0025 by Cindy Wilkes RN)  Free From Fall Injury: Instruct family/caregiver on patient safety     Problem: Skin/Tissue Integrity  Goal: Absence of new skin breakdown  Description: 1. Monitor for areas of redness and/or skin breakdown  2. Assess vascular access sites hourly  3. Every 4-6 hours minimum:  Change oxygen saturation probe site  4. Every 4-6 hours:  If on nasal continuous positive airway pressure, respiratory therapy assess nares and determine need for appliance change or resting period.   Outcome: Progressing     Problem: Pain  Goal: Verbalizes/displays adequate comfort level or baseline comfort level  Outcome: Progressing  Flowsheets (Taken 12/22/2023 0620 by Cindy Wilkes RN)  Verbalizes/displays adequate comfort level or baseline comfort level:   Encourage patient to monitor pain and request assistance   Assess pain using appropriate pain scale   Administer analgesics based on type and severity of pain and evaluate response   Implement non-pharmacological measures as appropriate and evaluate response     Problem: Chronic Conditions and Co-morbidities  Goal: Patient's chronic conditions and co-morbidity symptoms are monitored and maintained or improved  Outcome: Progressing     Problem: ABCDS Injury Assessment  Goal: Absence of physical injury  Outcome: Progressing

## 2024-01-02 ENCOUNTER — EKG ENCOUNTER (OUTPATIENT)
Dept: LAB | Age: 64
End: 2024-01-02
Attending: OBSTETRICS & GYNECOLOGY
Payer: COMMERCIAL

## 2024-01-02 ENCOUNTER — LABORATORY ENCOUNTER (OUTPATIENT)
Dept: LAB | Age: 64
End: 2024-01-02
Attending: OBSTETRICS & GYNECOLOGY
Payer: COMMERCIAL

## 2024-01-02 DIAGNOSIS — Z01.818 PRE-OP TESTING: ICD-10-CM

## 2024-01-02 LAB
ANION GAP SERPL CALC-SCNC: 4 MMOL/L (ref 0–18)
ATRIAL RATE: 70 BPM
BUN BLD-MCNC: 18 MG/DL (ref 9–23)
CALCIUM BLD-MCNC: 8.7 MG/DL (ref 8.5–10.1)
CHLORIDE SERPL-SCNC: 106 MMOL/L (ref 98–112)
CO2 SERPL-SCNC: 29 MMOL/L (ref 21–32)
CREAT BLD-MCNC: 1.13 MG/DL
EGFRCR SERPLBLD CKD-EPI 2021: 54 ML/MIN/1.73M2 (ref 60–?)
FASTING STATUS PATIENT QL REPORTED: YES
GLUCOSE BLD-MCNC: 115 MG/DL (ref 70–99)
OSMOLALITY SERPL CALC.SUM OF ELEC: 291 MOSM/KG (ref 275–295)
P AXIS: 65 DEGREES
P-R INTERVAL: 156 MS
POTASSIUM SERPL-SCNC: 3.5 MMOL/L (ref 3.5–5.1)
Q-T INTERVAL: 430 MS
QRS DURATION: 94 MS
QTC CALCULATION (BEZET): 464 MS
R AXIS: 54 DEGREES
SODIUM SERPL-SCNC: 139 MMOL/L (ref 136–145)
T AXIS: 165 DEGREES
VENTRICULAR RATE: 70 BPM

## 2024-01-02 PROCEDURE — 93005 ELECTROCARDIOGRAM TRACING: CPT

## 2024-01-02 PROCEDURE — 36415 COLL VENOUS BLD VENIPUNCTURE: CPT

## 2024-01-02 PROCEDURE — 80048 BASIC METABOLIC PNL TOTAL CA: CPT

## 2024-01-02 PROCEDURE — 93010 ELECTROCARDIOGRAM REPORT: CPT | Performed by: INTERNAL MEDICINE

## 2024-01-23 ENCOUNTER — HOSPITAL ENCOUNTER (OUTPATIENT)
Facility: HOSPITAL | Age: 64
Setting detail: HOSPITAL OUTPATIENT SURGERY
Discharge: HOME | End: 2024-01-23
Attending: OBSTETRICS & GYNECOLOGY | Admitting: OBSTETRICS & GYNECOLOGY
Payer: COMMERCIAL

## 2024-01-23 ENCOUNTER — ANESTHESIA (OUTPATIENT)
Dept: SURGERY | Facility: HOSPITAL | Age: 64
End: 2024-01-23
Payer: COMMERCIAL

## 2024-01-23 ENCOUNTER — ANESTHESIA EVENT (OUTPATIENT)
Dept: SURGERY | Facility: HOSPITAL | Age: 64
End: 2024-01-23
Payer: COMMERCIAL

## 2024-01-23 VITALS
HEIGHT: 64 IN | TEMPERATURE: 98 F | DIASTOLIC BLOOD PRESSURE: 76 MMHG | BODY MASS INDEX: 29.16 KG/M2 | SYSTOLIC BLOOD PRESSURE: 151 MMHG | HEART RATE: 58 BPM | OXYGEN SATURATION: 100 % | WEIGHT: 170.81 LBS | RESPIRATION RATE: 16 BRPM

## 2024-01-23 DIAGNOSIS — Z01.818 PRE-OP TESTING: Primary | ICD-10-CM

## 2024-01-23 PROCEDURE — 88312 SPECIAL STAINS GROUP 1: CPT | Performed by: OBSTETRICS & GYNECOLOGY

## 2024-01-23 PROCEDURE — 88305 TISSUE EXAM BY PATHOLOGIST: CPT | Performed by: OBSTETRICS & GYNECOLOGY

## 2024-01-23 PROCEDURE — 88342 IMHCHEM/IMCYTCHM 1ST ANTB: CPT | Performed by: OBSTETRICS & GYNECOLOGY

## 2024-01-23 PROCEDURE — 0HBAXZX EXCISION OF INGUINAL SKIN, EXTERNAL APPROACH, DIAGNOSTIC: ICD-10-PCS | Performed by: OBSTETRICS & GYNECOLOGY

## 2024-01-23 PROCEDURE — 0HB8XZX EXCISION OF BUTTOCK SKIN, EXTERNAL APPROACH, DIAGNOSTIC: ICD-10-PCS | Performed by: OBSTETRICS & GYNECOLOGY

## 2024-01-23 PROCEDURE — 0UBMXZZ EXCISION OF VULVA, EXTERNAL APPROACH: ICD-10-PCS | Performed by: OBSTETRICS & GYNECOLOGY

## 2024-01-23 RX ORDER — HYDROMORPHONE HYDROCHLORIDE 1 MG/ML
0.4 INJECTION, SOLUTION INTRAMUSCULAR; INTRAVENOUS; SUBCUTANEOUS EVERY 5 MIN PRN
Status: DISCONTINUED | OUTPATIENT
Start: 2024-01-23 | End: 2024-01-23

## 2024-01-23 RX ORDER — DEXAMETHASONE SODIUM PHOSPHATE 4 MG/ML
VIAL (ML) INJECTION AS NEEDED
Status: DISCONTINUED | OUTPATIENT
Start: 2024-01-23 | End: 2024-01-23 | Stop reason: SURG

## 2024-01-23 RX ORDER — ONDANSETRON 2 MG/ML
INJECTION INTRAMUSCULAR; INTRAVENOUS AS NEEDED
Status: DISCONTINUED | OUTPATIENT
Start: 2024-01-23 | End: 2024-01-23 | Stop reason: SURG

## 2024-01-23 RX ORDER — HYDROMORPHONE HYDROCHLORIDE 1 MG/ML
0.6 INJECTION, SOLUTION INTRAMUSCULAR; INTRAVENOUS; SUBCUTANEOUS EVERY 5 MIN PRN
Status: DISCONTINUED | OUTPATIENT
Start: 2024-01-23 | End: 2024-01-23

## 2024-01-23 RX ORDER — LIDOCAINE HYDROCHLORIDE AND EPINEPHRINE 10; 10 MG/ML; UG/ML
INJECTION, SOLUTION INFILTRATION; PERINEURAL AS NEEDED
Status: DISCONTINUED | OUTPATIENT
Start: 2024-01-23 | End: 2024-01-23

## 2024-01-23 RX ORDER — HYDROCODONE BITARTRATE AND ACETAMINOPHEN 10; 325 MG/1; MG/1
1 TABLET ORAL ONCE AS NEEDED
Status: DISCONTINUED | OUTPATIENT
Start: 2024-01-23 | End: 2024-01-23

## 2024-01-23 RX ORDER — LIDOCAINE HYDROCHLORIDE 10 MG/ML
INJECTION, SOLUTION EPIDURAL; INFILTRATION; INTRACAUDAL; PERINEURAL AS NEEDED
Status: DISCONTINUED | OUTPATIENT
Start: 2024-01-23 | End: 2024-01-23 | Stop reason: SURG

## 2024-01-23 RX ORDER — SODIUM CHLORIDE, SODIUM LACTATE, POTASSIUM CHLORIDE, CALCIUM CHLORIDE 600; 310; 30; 20 MG/100ML; MG/100ML; MG/100ML; MG/100ML
INJECTION, SOLUTION INTRAVENOUS CONTINUOUS
Status: DISCONTINUED | OUTPATIENT
Start: 2024-01-23 | End: 2024-01-23

## 2024-01-23 RX ORDER — NALOXONE HYDROCHLORIDE 0.4 MG/ML
0.08 INJECTION, SOLUTION INTRAMUSCULAR; INTRAVENOUS; SUBCUTANEOUS AS NEEDED
Status: DISCONTINUED | OUTPATIENT
Start: 2024-01-23 | End: 2024-01-23

## 2024-01-23 RX ORDER — ACETAMINOPHEN 500 MG
1000 TABLET ORAL ONCE AS NEEDED
Status: DISCONTINUED | OUTPATIENT
Start: 2024-01-23 | End: 2024-01-23

## 2024-01-23 RX ORDER — HYDROCODONE BITARTRATE AND ACETAMINOPHEN 10; 325 MG/1; MG/1
2 TABLET ORAL ONCE AS NEEDED
Status: DISCONTINUED | OUTPATIENT
Start: 2024-01-23 | End: 2024-01-23

## 2024-01-23 RX ORDER — KETOROLAC TROMETHAMINE 30 MG/ML
INJECTION, SOLUTION INTRAMUSCULAR; INTRAVENOUS AS NEEDED
Status: DISCONTINUED | OUTPATIENT
Start: 2024-01-23 | End: 2024-01-23 | Stop reason: SURG

## 2024-01-23 RX ORDER — ONDANSETRON 2 MG/ML
4 INJECTION INTRAMUSCULAR; INTRAVENOUS EVERY 6 HOURS PRN
Status: DISCONTINUED | OUTPATIENT
Start: 2024-01-23 | End: 2024-01-23

## 2024-01-23 RX ORDER — SCOLOPAMINE TRANSDERMAL SYSTEM 1 MG/1
1 PATCH, EXTENDED RELEASE TRANSDERMAL ONCE
Status: DISCONTINUED | OUTPATIENT
Start: 2024-01-23 | End: 2024-01-23

## 2024-01-23 RX ORDER — ACETAMINOPHEN 500 MG
1000 TABLET ORAL ONCE
Status: DISCONTINUED | OUTPATIENT
Start: 2024-01-23 | End: 2024-01-23

## 2024-01-23 RX ORDER — HYDROMORPHONE HYDROCHLORIDE 1 MG/ML
0.2 INJECTION, SOLUTION INTRAMUSCULAR; INTRAVENOUS; SUBCUTANEOUS EVERY 5 MIN PRN
Status: DISCONTINUED | OUTPATIENT
Start: 2024-01-23 | End: 2024-01-23

## 2024-01-23 RX ORDER — METOCLOPRAMIDE HYDROCHLORIDE 5 MG/ML
10 INJECTION INTRAMUSCULAR; INTRAVENOUS EVERY 8 HOURS PRN
Status: DISCONTINUED | OUTPATIENT
Start: 2024-01-23 | End: 2024-01-23

## 2024-01-23 RX ADMIN — LIDOCAINE HYDROCHLORIDE 100 MG: 10 INJECTION, SOLUTION EPIDURAL; INFILTRATION; INTRACAUDAL; PERINEURAL at 15:45:00

## 2024-01-23 RX ADMIN — DEXAMETHASONE SODIUM PHOSPHATE 8 MG: 4 MG/ML VIAL (ML) INJECTION at 15:55:00

## 2024-01-23 RX ADMIN — KETOROLAC TROMETHAMINE 30 MG: 30 INJECTION, SOLUTION INTRAMUSCULAR; INTRAVENOUS at 15:57:00

## 2024-01-23 RX ADMIN — SODIUM CHLORIDE, SODIUM LACTATE, POTASSIUM CHLORIDE, CALCIUM CHLORIDE: 600; 310; 30; 20 INJECTION, SOLUTION INTRAVENOUS at 15:43:00

## 2024-01-23 RX ADMIN — ONDANSETRON 4 MG: 2 INJECTION INTRAMUSCULAR; INTRAVENOUS at 15:55:00

## 2024-01-23 NOTE — BRIEF OP NOTE
Pre-Operative Diagnosis: VULVAR LESIONS     Post-Operative Diagnosis: VULVAR LESIONS      Procedure Performed:   RESECTION OF VULVAR LESION, AND SKIN BIOPSY OF RASH AND BUTTOCK LESION    Surgeon(s) and Role:     * Cassidy Rocha MD - Primary    Assistant(s):        Surgical Findings: vulvar and buttock lesions     Specimen: right and left vulvar lesions, right groin biopsy, buttock biopsy     Estimated Blood Loss: Blood Output: 2 mL (1/23/2024  4:23 PM)      Dictation Number:       Cassidy Rocha MD  1/23/2024  4:32 PM

## 2024-01-23 NOTE — ANESTHESIA POSTPROCEDURE EVALUATION
[facility]    Lois Carroll Patient Status:  Hospital Outpatient Surgery   Age/Gender 63 year old female MRN [id number]   Location [facility] SURGERY Attending Cassidy Rocha MD   Hosp Day # 0 PCP Tavares Amato MD       Anesthesia Post-op Note    RESECTION OF VULVAR LESION, AND SKIN BIOPSY OF RASH AND BUTTOCK LESION    Procedure Summary       Date: 01/23/24 Room / Location:  MAIN OR [address] [place] [address] MAIN OR    Anesthesia Start: 1542 Anesthesia Stop: 1630    Procedure: RESECTION OF VULVAR LESION, AND SKIN BIOPSY OF RASH AND BUTTOCK LESION (Vagina ) Diagnosis: (VULVAR LESIONS)    Surgeons: Cassidy Rocha MD Anesthesiologist: Frankie Mayfield MD    Anesthesia Type: MAC ASA Status: 3            Anesthesia Type: MAC    Vitals Value Taken Time   /71 01/23/24 1636   Temp 98 01/23/24 1636   Pulse 69 01/23/24 1636   Resp 12 01/23/24 1636   SpO2 99 01/23/24 1636       Patient Location: Same Day Surgery    Anesthesia Type: MAC    Airway Patency: patent    Postop Pain Control: adequate    Mental Status: preanesthetic baseline    Nausea/Vomiting: none    Cardiopulmonary/Hydration status: stable euvolemic    Complications: no apparent anesthesia related complications    Postop vital signs: stable    Dental Exam: Unchanged from Preop    Patient to be discharged home when criteria met.  109/71

## 2024-01-23 NOTE — OPERATIVE REPORT
[facility]    PATIENT'S NAME: HELDER PUTNAM   ATTENDING PHYSICIAN: Cassidy Rocha M.D.   OPERATING PHYSICIAN: Cassidy Rocha M.D.   PATIENT ACCOUNT#:   [id number]    LOCATION:  [address]  MEDICAL RECORD #:   [id number]       YOB: 1960  ADMISSION DATE:       01/23/2024      OPERATION DATE:  01/23/2024    OPERATIVE REPORT      PREOPERATIVE DIAGNOSIS:  Vulvar lesions and buttock lesion.  POSTOPERATIVE DIAGNOSIS:  Vulvar lesions and buttock lesion.  PROCEDURE:  Excision of bilateral vulvar lesions, right groin skin biopsy, and biopsy of buttock lesion.      OPERATIVE TECHNIQUE:  The patient was taken to the operating room with an IV running.  She was administered anesthesia without incident.  Once her anesthesia was adequate, she was prepped and draped in normal sterile fashion in the dorsal lithotomy position.  Vulvar exam was performed preoperatively to confirm locations of the palpable lesions.  The vulva was prepped with Betadine paint.  The lesions were identified, were infiltrated with 1% lidocaine with epinephrine.  The lesions were approximately level of the clitoris in the fold between the labia minora and majora.  They were excised using a knife.  The base was closed using 3-0 Vicryl and the defects were closed with 4-0 Vicryl in a subcuticular fashion.  The right groin skin chronic inflammation was infiltrated with 1% lidocaine with epinephrine and a small biopsy was removed with scissors that was made hemostatic with cautery.  There was an approximately 2 x 3 cm buttock lesion in the fold of the buttocks.  The edge of this was infiltrated with 1% lidocaine with epinephrine, and this was biopsied by grasping the tissue with the pickups and excising a small piece of tissue with the scissors and that was also made hemostatic with cautery.  Once this was all completed, the lesions were all infiltrated again with 1% lidocaine with epinephrine.  A total of 17 mL was  used, and the patient was awakened from anesthesia and brought to the recovery room in excellent condition.    Dictated By Cassidy Rocha M.D.  d: 01/23/2024 16:32:16  t: 01/23/2024 17:33:30  Job [id number]  AS/

## 2024-01-23 NOTE — DISCHARGE INSTRUCTIONS
PERIPAD TO SITE, DISSOLVABLE SUTURES USED, MAY SHOWER TOMORROW, NO SOAKING IN A TUB FOR 2 WEEKS    You received a drug called Toradol which is an Anti Inflammatory at:4 pm     Do not take any Anti Inflammatory like Motrin, Advil, Aleve or Ibuprofen until after: 10 pm   Please report any suspected allergic reactions or bleeding issues to your doctor

## 2024-01-23 NOTE — ANESTHESIA PREPROCEDURE EVALUATION
PRE-OP EVALUATION    Patient Name: Lois Carroll    Admit Diagnosis: VULVAR LESIONS    Pre-op Diagnosis: VULVAR LESIONS    RESECTION OF VULVAR LESION, AND SKIN BIOPSY OF RASH AND BUTTOCK LESION    Anesthesia Procedure: RESECTION OF VULVAR LESION, AND SKIN BIOPSY OF RASH AND BUTTOCK LESION (Vagina )    Surgeon(s) and Role:     * Cassidy Rocha MD - Primary    Pre-op vitals reviewed.  Temp: 98.2 °F (36.8 °C)  Pulse: 63  Resp: 14  BP: 162/82  SpO2: 93 %  Body mass index is 29.32 kg/m².    Current medications reviewed.  Hospital Medications:  • [MAR Hold] acetaminophen (Tylenol Extra Strength) tab 1,000 mg  1,000 mg Oral Once   • [MAR Hold] scopolamine (Transderm-Scop) 1 MG/3DAYS patch 1 patch  1 patch Transdermal Once   • lactated ringers infusion   Intravenous Continuous       Outpatient Medications:     Medications Prior to Admission   Medication Sig Dispense Refill Last Dose   • VENLAFAXINE ER 75 MG Oral Capsule SR 24 Hr TAKE 1 CAPSULE BY MOUTH TWO TIMES A DAY WITH MEALS 180 capsule 0    • ATORVASTATIN 80 MG Oral Tab TAKE 1 TABLET BY MOUTH EVERY DAY 90 tablet 0    • montelukast 10 MG Oral Tab Take 1 tablet (10 mg total) by mouth nightly. 90 tablet 3    • GABAPENTIN 300 MG Oral Cap TAKE 1 CAPSULE BY MOUTH THREE TIMES A DAY 90 capsule 0    • guaiFENesin-codeine 100-10 MG/5ML Oral Solution Take 5 mL by mouth every 6 (six) hours as needed. 180 mL 0    • METOPROLOL SUCCINATE 25 MG Oral Tablet 24 Hr TAKE 1 TABLET BY MOUTH TWO TIMES A DAY  180 tablet 3 1/23/2024 at 0400   • VENTOLIN  (90 Base) MCG/ACT Inhalation Aero Soln INHALE 2 PUFFS ORALLY EVERY FOUR HOURS AS NEEDED FOR WHEEZING 18 g 0    • IPRATROPIUM-ALBUTEROL 0.5-2.5 (3) MG/3ML Inhalation Solution Take 3 ml (1 vial) by nebulation every 4 hours as needed 90 mL 1    • B Complex-C-Folic Acid (HM VITAMIN B COMPLEX/VITAMIN C) Oral Tab Take 1 tablet by mouth daily.        • Multiple Vitamin (TAB-A-ALICIA) Oral Tab Take 1 tablet by mouth daily.      • aspirin  325 MG Oral Tab Take 1 tablet (325 mg total) by mouth daily. (Patient taking differently: Take 81 mg by mouth daily.) 30 tablet 1 More than a month       Allergies: Actifed [triprolidine-pse], Doxycycline, Erythromycin base, and Penicillins      Anesthesia Evaluation    Patient summary reviewed.    Anesthetic Complications  (-) history of anesthetic complications         GI/Hepatic/Renal    Negative GI/hepatic/renal ROS.                             Cardiovascular  Comment: S/p TAVR      Exercise tolerance: good     MET: >4      (+) hypertension   (+) hyperlipidemia                                  Endo/Other    Negative endo/other ROS.                              Pulmonary      (+) asthma         (-) shortness of breath            Neuro/Psych      (+) depression                              Past Surgical History:   Procedure Laterality Date   • BENIGN BIOPSY LEFT  2010   • BENIGN BIOPSY RIGHT  10/2017    benign    • CHOLECYSTECTOMY  2001    laparoscopic   • COLONOSCOPY N/A 8/24/2022    Procedure: COLONOSCOPY;  Surgeon: Jason French MD;  Location:  ENDOSCOPY   • NEEDLE BIOPSY LEFT  2006    twice (sebaceous cyst)   • OTHER      had tracheostomy for several weeks in the 1980's   • OTHER SURGICAL HISTORY  0908    laser surgery   • OTHER SURGICAL HISTORY      laparoscopy ov cyst   • OTHER SURGICAL HISTORY  2009    laser vulva condyloma   • REPLACE AORTIC VALVE OPEN  01/2018   • TUBAL LIGATION      filshie cli[ps[   • VALVE REPAIR       Social History     Socioeconomic History   • Marital status:    • Number of children: 3   Occupational History   • 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   Tobacco Use   • Smoking status: Some Days     Packs/day: 1.00     Years: 40.00     Additional pack years: 0.00     Total pack years: 40.00     Types: Cigarettes     Start date: 1976   • Smokeless tobacco: Never   • Tobacco comments:     when she smokes-smokes a few a day   Vaping Use   • Vaping Use: Never  used   Substance and Sexual Activity   • Alcohol use: Yes     Alcohol/week: 3.0 standard drinks of alcohol     Types: 3 Standard drinks or equivalent per week     Comment: socially/ cage questions answered   • Drug use: Yes     Types: Cannabis     Comment: occ   • Sexual activity: Yes     Partners: Male   Other Topics Concern   •  Service No   • Blood Transfusions Yes     Comment: prior to 1985   • Caffeine Concern No     Comment: 1 cup of coffee daily    • Exercise No   • Seat Belt Yes     History   Drug Use   • Types: Cannabis     Comment: occ     Available pre-op labs reviewed.     Lab Results   Component Value Date     01/02/2024    K 3.5 01/02/2024     01/02/2024    CO2 29.0 01/02/2024    BUN 18 01/02/2024    CREATSERUM 1.13 (H) 01/02/2024     (H) 01/02/2024    CA 8.7 01/02/2024            Airway      Mallampati: II  Mouth opening: 3 FB  TM distance: 4 - 6 cm  Neck ROM: full Cardiovascular      Rhythm: regular  Rate: normal     Dental      Dental appliance(s): upper dentures and lower dentures       Pulmonary      Breath sounds clear to auscultation bilaterally.               Other findings            ASA: 3   Plan: MAC  NPO status verified and patient meets guidelines.    Post-procedure pain management plan discussed with surgeon and patient.    Comment: Plan is MAC anesthesia, which may include deep sedation.  Implied that memory of procedure is unlikely although intraop recall, if it occurs, may be a reasonable and comfortable experience with this anesthetic.  Aware that general anesthesia is not intended though deep sedation may include occasional moments of general anesthesia.  The backup plan for safe patient care may include change of plan to full general anesthesia with potential airway placement.  Patient (legal guardian) demonstrated understanding, accepts risks and benefits, and wishes to proceed as reflected in the signed consent form.  Difficulty airway equipment  available as needed, may need general anesthesia OETT.  Previous anesthesia records reviewed.    Plan/risks discussed with: patient and spouse                Present on Admission:  **None**

## 2024-01-23 NOTE — H&P
[facility]    History & Physical    Lois Carroll Patient Status:  Hospital Outpatient Surgery    10/6/1960 MRN [id number]   Location [facility] SURGERY Attending Cassidy Rocha MD   Hosp Day # 0 PCP Tavares Amato MD     Date of Admission:  (Not on file)    SUBJECTIVE:    Reason for Admission:  Vulvar lesions    History of Present Illness:  Patient is a(n) 63 year old female GP/LMP G0 with vulvar lesions presents for resection of vulvar lesions, biopsy of groin and buttock lesions    Medications:  No medications prior to admission.       Allergies:  Allergies   Allergen Reactions    Actifed [Triprolidine-Pse] OTHER (SEE COMMENTS)     Makes her hypertensive    Doxycycline NAUSEA AND VOMITING    Erythromycin Base RASH    Penicillins OTHER (SEE COMMENTS)     Has absolutely no effect on patient-does not work        Past Medical History:  Past Medical History:   Diagnosis Date    ARDS (adult respiratory distress syndrome) (HCC) 2018    Asthma     Carotid bruit present     Depression     High blood pressure     High cholesterol     History of blood transfusion     HYPERLIPIDEMIA     Migraines     Usual hyperplasia of lactiferous duct 10/2017    Valvular disease     Visual impairment     glasses       Past Surgical History:  Past Surgical History:   Procedure Laterality Date    BENIGN BIOPSY LEFT      BENIGN BIOPSY RIGHT  10/2017    benign     CHOLECYSTECTOMY      laparoscopic    COLONOSCOPY N/A 2022    Procedure: COLONOSCOPY;  Surgeon: Jason Frnech MD;  Location:  ENDOSCOPY    NEEDLE BIOPSY LEFT      twice (sebaceous cyst)    OTHER      had tracheostomy for several weeks in the     OTHER SURGICAL HISTORY  09    laser surgery    OTHER SURGICAL HISTORY      laparoscopy ov cyst    OTHER SURGICAL HISTORY      laser vulva condyloma    REPLACE AORTIC VALVE OPEN  2018    TUBAL LIGATION      navarro tompkins[ps[    VALVE REPAIR         Past OB History:  OB History     Para Term  AB Living   5 3 3   2 3   SAB IAB Ectopic Multiple Live Births   2       3      # Outcome Date GA Lbr Augustin/2nd Weight Sex Delivery Anes PTL Lv   5 Term  43w0d   M NORMAL SPONT   DENEEN   4 Term  40w0d   F NORMAL SPONT   DENEEN   3 Term  39w0d   F NORMAL SPONT   DENEEN   2 SAB            1 SAB                Past GYN History:   Menopausal.  H/o genital condyloma    Social History:  Social History     Tobacco Use    Smoking status: Some Days     Packs/day: 1.00     Years: 40.00     Additional pack years: 0.00     Total pack years: 40.00     Types: Cigarettes     Start date:     Smokeless tobacco: Never    Tobacco comments:     when she smokes-smokes a few a day   Substance Use Topics    Alcohol use: Yes     Alcohol/week: 3.0 standard drinks of alcohol     Types: 3 Standard drinks or equivalent per week     Comment: socially/ cage questions answered        Review of Systems:  Constitutional: negative; feels well  Eyes: negative; denies blurred or double vision  ENT: negative; denies nasal congestion or sinus pain  Cardiovascular: negative; denies chest pain or palpitations  Respiratory: negative; denies shortness of breath  Gastrointestinal: negative; denies heartburn, abdominal pain, diarrhea or constiptation  Genitourinary: negative; denies dysuria, incontinence, vaginal itching or discharge.    Musculoskeletal: negative; denies back pain.  Skin/Breast: negative; Denies any breast pain, lumps, or discharge.  Denies any skin lesions.  Neurological: negative; denies headaches, extremity weakness or numbness.  Psychiatric: negative; denies depression or anxiety.  Endocrine:  negative; denies any thyroid history; denies excessive thirst or urination.  Heme/Lymph: negative; denies easy bruising or bleeding.      OBJECTIVE:       No intake or output data in the 24 hours ending 24 1313    Physical Exam:  GENERAL: well developed, well nourished, in no apparent distress  SKIN: no rashes, no  suspicious lesions  HEENT: normal  NECK: supple; no thyroidmegaly, no adenopathy  BREAST: no masses, no nipple discharge, no skin retraction, no axillary adenopathy  LUNGS: clear to auscultation  CARDIOVASCULAR: normal S1, S2, RRR  ABDOMEN: Soft, non distended; non tender, no masses  GYNE/: External Genitalia: bilateral lesions labia majora, rash groin, lesion buttock                     Vagina: normal                      Uterus: anteverted, mobile, non tender, small                     Cervix: nullip, no lesions                     Adnexa: non tender, no masses  EXTREMITIES:  non tender without edema  NEUROLOGIC: intact  PSYCHIATRIC: alert and oriented x 3; affect appropriate      Diagnostics:       Data Review:        ASSESSMENT:    Patient Active Problem List   Diagnosis    Pure hypercholesterolemia    HTN (hypertension)    Depressed    Allergic rhinitis    Headache    Anemia    Aortic valve replaced    Intracranial aneurysm    Leukocytosis    Hyperglycemia    Acute bronchospasm    Metabolic alkalosis    Respiratory acidosis    Hypoxia    Moderate persistent asthma without complication            PLAN:    64 y/o smoker with h/o genital condyloma presents for resection of vulvar lesions, biopsy of groin rash and biopsy of buttock lesion    All of the findings and plan were discussed with the patient.  She notes understanding and agrees with the plan of care.  All questions were answered to the best of my ability at this time.    Cassidy Rocha MD  1/23/2024  1:13 PM
